# Patient Record
Sex: FEMALE | Race: WHITE | NOT HISPANIC OR LATINO | URBAN - METROPOLITAN AREA
[De-identification: names, ages, dates, MRNs, and addresses within clinical notes are randomized per-mention and may not be internally consistent; named-entity substitution may affect disease eponyms.]

---

## 2023-10-23 DIAGNOSIS — E78.5 HYPERLIPIDEMIA, UNSPECIFIED HYPERLIPIDEMIA TYPE: ICD-10-CM

## 2023-11-27 ENCOUNTER — OFFICE VISIT (OUTPATIENT)
Dept: OPHTHALMOLOGY | Facility: CLINIC | Age: 68
End: 2023-11-27
Payer: MEDICARE

## 2023-11-27 DIAGNOSIS — H53.47 HETERONYMOUS BILATERAL FIELD DEFECTS IN VISUAL FIELD: Primary | ICD-10-CM

## 2023-11-27 DIAGNOSIS — H40.002 GLAUCOMA SUSPECT, LEFT: ICD-10-CM

## 2023-11-27 DIAGNOSIS — H35.362 DRUSEN OF MACULA, LEFT: ICD-10-CM

## 2023-11-27 PROCEDURE — 92133 CPTRZD OPH DX IMG PST SGM ON: CPT | Performed by: PSYCHIATRY & NEUROLOGY

## 2023-11-27 PROCEDURE — 92083 EXTENDED VISUAL FIELD XM: CPT | Performed by: PSYCHIATRY & NEUROLOGY

## 2023-11-27 PROCEDURE — 99205 OFFICE O/P NEW HI 60 MIN: CPT | Performed by: PSYCHIATRY & NEUROLOGY

## 2023-11-27 RX ORDER — EXEMESTANE 25 MG/1
TABLET ORAL
COMMUNITY
Start: 2017-04-04

## 2023-11-27 RX ORDER — DEXLANSOPRAZOLE 60 MG/1
1 CAPSULE, DELAYED RELEASE ORAL DAILY
COMMUNITY
Start: 2023-09-27 | End: 2024-02-28 | Stop reason: SDUPTHER

## 2023-11-27 RX ORDER — CHOLECALCIFEROL (VITAMIN D3) 25 MCG
TABLET ORAL
COMMUNITY

## 2023-11-27 RX ORDER — ASPIRIN 81 MG/1
TABLET ORAL
COMMUNITY

## 2023-11-27 RX ORDER — CALCITRIOL 0.25 UG/1
1 CAPSULE ORAL DAILY
COMMUNITY
Start: 2017-04-04

## 2023-11-27 ASSESSMENT — CUP TO DISC RATIO
OS_RATIO: 0.55
OD_RATIO: 0.4

## 2023-11-27 ASSESSMENT — ENCOUNTER SYMPTOMS
NEUROLOGICAL NEGATIVE: 0
GASTROINTESTINAL NEGATIVE: 0
EYES NEGATIVE: 0
PSYCHIATRIC NEGATIVE: 0
RESPIRATORY NEGATIVE: 0
ALLERGIC/IMMUNOLOGIC NEGATIVE: 0
ENDOCRINE NEGATIVE: 0
MUSCULOSKELETAL NEGATIVE: 0
CARDIOVASCULAR NEGATIVE: 0
HEMATOLOGIC/LYMPHATIC NEGATIVE: 0
CONSTITUTIONAL NEGATIVE: 0

## 2023-11-27 ASSESSMENT — CONF VISUAL FIELD
OD_NORMAL: 1
OS_SUPERIOR_NASAL_RESTRICTION: 0
OD_INFERIOR_TEMPORAL_RESTRICTION: 0
OS_NORMAL: 1
OS_SUPERIOR_TEMPORAL_RESTRICTION: 0
METHOD: COUNTING FINGERS
OD_SUPERIOR_TEMPORAL_RESTRICTION: 0
OS_INFERIOR_NASAL_RESTRICTION: 0
OD_SUPERIOR_NASAL_RESTRICTION: 0
OD_INFERIOR_NASAL_RESTRICTION: 0
OS_INFERIOR_TEMPORAL_RESTRICTION: 0

## 2023-11-27 ASSESSMENT — SLIT LAMP EXAM - LIDS
COMMENTS: NORMAL
COMMENTS: NORMAL

## 2023-11-27 ASSESSMENT — VISUAL ACUITY
OD_CC: 20/20
OS_CC: 20/20
METHOD: SNELLEN - LINEAR

## 2023-11-27 ASSESSMENT — TONOMETRY
IOP_METHOD: GOLDMANN APPLANATION
OD_IOP_MMHG: 12
OS_IOP_MMHG: 12

## 2023-11-27 ASSESSMENT — EXTERNAL EXAM - LEFT EYE: OS_EXAM: NORMAL

## 2023-11-27 ASSESSMENT — EXTERNAL EXAM - RIGHT EYE: OD_EXAM: NORMAL

## 2023-11-27 NOTE — LETTER
November 27, 2023     Edmund Roblero    Patient: Kiley Ward   YOB: 1955   Date of Visit: 11/27/2023     Dear Dr. Edmund Roblero:    I am writing to share my findings regarding our shared patient Kiley Ward from her visit with me on 11/27/2023.    HPI    This 68 year-old woman with a history of HER2 and ER positive bilateral breast cancer (remission since 2011, s/p chemo and XRT), osteoporosis, and goiter presents for evaluation of binasal visual field deficits.    She was referred by optometrist Dr. Edmund Roblero from [facility] for evaluation of reproducible binasal visual field deficits and IOP of 9 and 11 respectively.     Her only visual complaint is floaters. She is not aware of any peripheral vision decrease. She denies other neurological symptoms.  Last edited by Roger Downs MD PhD on 11/27/2023 10:35 AM.        Diagnoses    Diagnoses and all orders for this visit:  Heteronymous bilateral field defects in visual field (Primary)  -     OCT, Optic Nerve - OU - Both Eyes  -     OCT, Retina - OU - Both Eyes  -     Anderson Visual Field - OU - Both Eyes  Drusen of macula, left  Glaucoma suspect, left    Assessment and Plan    11/27/2023 OCT RNFL  & OS 82 with borderline S thinning.  OCT macula OD normal foveal contour 247 & OS normal foveal contour 247.    11/27/2023 HVF 24-2 OD fovea 32, scatter MD -1.75 & OS fovea 35, rim artifact MD -4.16.  10/03/2023 HVF 24-2 reliable, with early nasal step defect OU (no mean deviation given)  10/17/2023 HVF 30-2 OD early nasal VF deficit MD -1.44, OS early nasal step and superotemporal deficit MD -3.81     This 68 year-old woman with a history of HER2 and ER positive bilateral breast cancer (remission since 2011, s/p chemo and XRT), osteoporosis, and goiter presents for evaluation of binasal visual field deficits.    Testing with Anderson visual field (HVF) is reassuring, but funduscopy and OCT retinal nerve fiber layer raise  concerns for superior thinning of the left optic disc rim concerning for glaucoma. Other causes of optic neuropathy are far less likely. We discussed glaucoma evaluation.    She also has a few large drusen in the left macula. 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 We discussed monitoring with a retina specialist.    Plan    Referrals for glaucoma and retina evaluations.    Follow up with neuro-ophthalmology if concerns otherwise arise. (Dilated 11/27/2023)      Below you will find my full examination. I appreciate the opportunity to see Kiley Ward today and to share in her care with you. Please contact me if you have questions for me regarding this visit or if I can be of assistance to another of your patients with neuro-ophthalmological problems.    Sincerely,    Roger Downs MD PhD    CC:   No Recipients      Base Eye Exam       Visual Acuity (Snellen - Linear)         Right Left    Dist cc 20/20 20/20              Tonometry (Goldmann Applanation, 9:35 AM)         Right Left    Pressure 12 12              Pupils         Dark Light Shape React APD    Right 4 2 Round Brisk None    Left 4 2 Round Brisk None              Visual Fields (Counting fingers)         Left Right     Full Full   CF and red test object             Extraocular Movement         Right Left     Full, Ortho Full, Ortho              Neuro/Psych       Oriented x3: Yes              Dilation       Both eyes: 1% Mydriacyl & 2.5% Jimmy  @ 10:37 AM                  Additional Tests       Color         Right Left    Ishihara 11 11                  Slit Lamp and Fundus Exam       External Exam         Right Left    External Normal Normal              Slit Lamp Exam         Right Left    Lids/Lashes Normal Normal    Conjunctiva/Sclera White and quiet White and quiet    Cornea Clear Clear    Anterior Chamber Deep and quiet Deep and quiet    Iris Round and reactive Round and reactive    Lens Trace Nuclear sclerosis  Trace Nuclear sclerosis    Anterior Vitreous Posterior vitreous detachment Posterior vitreous detachment              Fundus Exam         Right Left    Disc Normal superior notch    C/D Ratio 0.4 0.55    Macula Normal few large drusen    Vessels Normal Normal    Periphery Normal Normal

## 2023-11-27 NOTE — PROGRESS NOTES
Assessment and Plan    11/27/2023 OCT RNFL  & OS 82 with borderline S thinning.  OCT macula OD normal foveal contour 247 & OS normal foveal contour 247.    11/27/2023 HVF 24-2 OD fovea 32, scatter MD -1.75 & OS fovea 35, rim artifact MD -4.16.  10/03/2023 HVF 24-2 reliable, with early nasal step defect OU (no mean deviation given)  10/17/2023 HVF 30-2 OD early nasal VF deficit MD -1.44, OS early nasal step and superotemporal deficit MD -3.81     This 68 year-old woman with a history of HER2 and ER positive bilateral breast cancer (remission since 2011, s/p chemo and XRT), osteoporosis, and goiter presents for evaluation of binasal visual field deficits.    Testing with Anderson visual field (HVF) is reassuring, but funduscopy and OCT retinal nerve fiber layer raise concerns for superior thinning of the left optic disc rim concerning for glaucoma. Other causes of optic neuropathy are far less likely. We discussed glaucoma evaluation.    She also has a few large drusen in the left macula. 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 We discussed monitoring with a retina specialist.    Plan    Referrals for glaucoma and retina evaluations.    Follow up with neuro-ophthalmology if concerns otherwise arise. (Dilated 11/27/2023)

## 2023-12-07 ENCOUNTER — APPOINTMENT (OUTPATIENT)
Dept: PRIMARY CARE | Facility: CLINIC | Age: 68
End: 2023-12-07
Payer: MEDICARE

## 2024-02-21 DIAGNOSIS — Z00.00 HEALTHCARE MAINTENANCE: ICD-10-CM

## 2024-02-21 DIAGNOSIS — E78.5 HYPERLIPIDEMIA, UNSPECIFIED HYPERLIPIDEMIA TYPE: ICD-10-CM

## 2024-02-22 ENCOUNTER — APPOINTMENT (OUTPATIENT)
Dept: OPHTHALMOLOGY | Facility: CLINIC | Age: 69
End: 2024-02-22
Payer: MEDICARE

## 2024-02-23 ENCOUNTER — LAB (OUTPATIENT)
Dept: LAB | Facility: LAB | Age: 69
End: 2024-02-23
Payer: MEDICARE

## 2024-02-23 DIAGNOSIS — Z00.00 HEALTHCARE MAINTENANCE: ICD-10-CM

## 2024-02-23 DIAGNOSIS — E78.5 HYPERLIPIDEMIA, UNSPECIFIED HYPERLIPIDEMIA TYPE: ICD-10-CM

## 2024-02-23 LAB
ALBUMIN SERPL BCP-MCNC: 4.4 G/DL (ref 3.4–5)
ALP SERPL-CCNC: 66 U/L (ref 33–136)
ALT SERPL W P-5'-P-CCNC: 18 U/L (ref 7–45)
ANION GAP SERPL CALC-SCNC: 10 MMOL/L (ref 10–20)
AST SERPL W P-5'-P-CCNC: 22 U/L (ref 9–39)
BILIRUB SERPL-MCNC: 0.6 MG/DL (ref 0–1.2)
BUN SERPL-MCNC: 20 MG/DL (ref 6–23)
CALCIUM SERPL-MCNC: 9.6 MG/DL (ref 8.6–10.3)
CHLORIDE SERPL-SCNC: 104 MMOL/L (ref 98–107)
CHOLEST SERPL-MCNC: 200 MG/DL (ref 0–199)
CHOLESTEROL/HDL RATIO: 3.1
CO2 SERPL-SCNC: 28 MMOL/L (ref 21–32)
CREAT SERPL-MCNC: 0.95 MG/DL (ref 0.5–1.05)
EGFRCR SERPLBLD CKD-EPI 2021: 66 ML/MIN/1.73M*2
GLUCOSE SERPL-MCNC: 83 MG/DL (ref 74–99)
HDLC SERPL-MCNC: 65.2 MG/DL
LDLC SERPL CALC-MCNC: 120 MG/DL
NON HDL CHOLESTEROL: 135 MG/DL (ref 0–149)
POTASSIUM SERPL-SCNC: 3.9 MMOL/L (ref 3.5–5.3)
PROT SERPL-MCNC: 7.6 G/DL (ref 6.4–8.2)
SODIUM SERPL-SCNC: 138 MMOL/L (ref 136–145)
TRIGL SERPL-MCNC: 72 MG/DL (ref 0–149)
VLDL: 14 MG/DL (ref 0–40)

## 2024-02-23 PROCEDURE — 80053 COMPREHEN METABOLIC PANEL: CPT

## 2024-02-23 PROCEDURE — 80061 LIPID PANEL: CPT

## 2024-02-24 NOTE — RESULT ENCOUNTER NOTE
Cholesterol did increase slightly up to 200, HDL 65, , triglyceride 92    Sugar, kidneys, liver, electrodes are all within normal limits

## 2024-02-26 ENCOUNTER — OFFICE VISIT (OUTPATIENT)
Dept: OPHTHALMOLOGY | Facility: CLINIC | Age: 69
End: 2024-02-26
Payer: MEDICARE

## 2024-02-26 DIAGNOSIS — H43.813 PVD (POSTERIOR VITREOUS DETACHMENT), BOTH EYES: ICD-10-CM

## 2024-02-26 DIAGNOSIS — H35.3124 NONEXUDATIVE AGE-RELATED MACULAR DEGENERATION, LEFT EYE, ADVANCED ATROPHIC WITH SUBFOVEAL INVOLVEMENT: ICD-10-CM

## 2024-02-26 DIAGNOSIS — H35.30 ARMD (AGE RELATED MACULAR DEGENERATION): Primary | ICD-10-CM

## 2024-02-26 PROCEDURE — 92134 CPTRZ OPH DX IMG PST SGM RTA: CPT | Performed by: OPHTHALMOLOGY

## 2024-02-26 PROCEDURE — 99213 OFFICE O/P EST LOW 20 MIN: CPT | Performed by: OPHTHALMOLOGY

## 2024-02-26 ASSESSMENT — ENCOUNTER SYMPTOMS
EYES NEGATIVE: 1
RESPIRATORY NEGATIVE: 0
ALLERGIC/IMMUNOLOGIC NEGATIVE: 0
GASTROINTESTINAL NEGATIVE: 0
MUSCULOSKELETAL NEGATIVE: 0
CONSTITUTIONAL NEGATIVE: 0
PSYCHIATRIC NEGATIVE: 0
ENDOCRINE NEGATIVE: 0
NEUROLOGICAL NEGATIVE: 0
HEMATOLOGIC/LYMPHATIC NEGATIVE: 0
CARDIOVASCULAR NEGATIVE: 0

## 2024-02-26 ASSESSMENT — CUP TO DISC RATIO
OS_RATIO: 0.5
OD_RATIO: 0.3

## 2024-02-26 ASSESSMENT — VISUAL ACUITY
OS_SC: 20/20
OS_SC+: -2
OD_SC+: -1
OD_SC: 20/20
METHOD: SNELLEN - LINEAR

## 2024-02-26 ASSESSMENT — SLIT LAMP EXAM - LIDS
COMMENTS: NORMAL
COMMENTS: NORMAL

## 2024-02-26 ASSESSMENT — EXTERNAL EXAM - RIGHT EYE: OD_EXAM: NORMAL

## 2024-02-26 ASSESSMENT — TONOMETRY
OD_IOP_MMHG: 13
IOP_METHOD: TONOPEN
OS_IOP_MMHG: 14

## 2024-02-26 ASSESSMENT — EXTERNAL EXAM - LEFT EYE: OS_EXAM: NORMAL

## 2024-02-26 NOTE — PROGRESS NOTES
Assessment/Plan   Diagnoses and all orders for this visit:  ARMD (age related macular degeneration)  -     OCT, Retina - OU - Both Eyes  Nonexudative age-related macular degeneration, left eye, advanced atrophic with subfoveal involvement  PVD (posterior vitreous detachment), both eyes    No history of checkpoint inhibitors  Had breast cancer    1. Importance of avoiding situations of risk for eye injury and of routine use of appropriate safety eye protection discussed with patient and emphasized.  2. Diet and lifestyle precautions regarding age-related macular degeneration discussed.  Have recommended AREDS 2 vitamins, refraining from smoking, UV protection, and self-monitoring of vision. Patient advised to seek ophthalmic follow-up promptly if there are significant changes in vision.  3. Follow up retina      She has few drusen in both eyes as well as fine epiretinal membrane (ERM) in both eyes  Vision is excellent  Follow up 6 months

## 2024-02-28 ENCOUNTER — OFFICE VISIT (OUTPATIENT)
Dept: PRIMARY CARE | Facility: CLINIC | Age: 69
End: 2024-02-28
Payer: MEDICARE

## 2024-02-28 VITALS
HEART RATE: 66 BPM | WEIGHT: 157 LBS | SYSTOLIC BLOOD PRESSURE: 122 MMHG | BODY MASS INDEX: 26.95 KG/M2 | DIASTOLIC BLOOD PRESSURE: 80 MMHG

## 2024-02-28 DIAGNOSIS — Z12.11 SCREENING FOR MALIGNANT NEOPLASM OF COLON: ICD-10-CM

## 2024-02-28 DIAGNOSIS — E55.9 VITAMIN D DEFICIENCY: ICD-10-CM

## 2024-02-28 DIAGNOSIS — Z00.00 HEALTHCARE MAINTENANCE: ICD-10-CM

## 2024-02-28 DIAGNOSIS — Z23 NEED FOR PNEUMOCOCCAL 20-VALENT CONJUGATE VACCINATION: ICD-10-CM

## 2024-02-28 DIAGNOSIS — Z85.3 HISTORY OF BREAST CANCER IN FEMALE: ICD-10-CM

## 2024-02-28 DIAGNOSIS — Z12.31 ENCOUNTER FOR SCREENING MAMMOGRAM FOR MALIGNANT NEOPLASM OF BREAST: ICD-10-CM

## 2024-02-28 DIAGNOSIS — K21.9 GASTROESOPHAGEAL REFLUX DISEASE, UNSPECIFIED WHETHER ESOPHAGITIS PRESENT: ICD-10-CM

## 2024-02-28 DIAGNOSIS — E78.5 HYPERLIPIDEMIA, UNSPECIFIED HYPERLIPIDEMIA TYPE: ICD-10-CM

## 2024-02-28 DIAGNOSIS — Z00.00 MEDICARE ANNUAL WELLNESS VISIT, SUBSEQUENT: Primary | ICD-10-CM

## 2024-02-28 DIAGNOSIS — M81.0 AGE-RELATED OSTEOPOROSIS WITHOUT CURRENT PATHOLOGICAL FRACTURE: ICD-10-CM

## 2024-02-28 PROBLEM — M19.90 ARTHRITIS: Status: ACTIVE | Noted: 2024-02-28

## 2024-02-28 PROBLEM — K57.90 DIVERTICULAR DISEASE: Status: ACTIVE | Noted: 2024-02-28

## 2024-02-28 PROBLEM — E05.90 HYPERTHYROIDISM: Status: ACTIVE | Noted: 2024-02-28

## 2024-02-28 PROBLEM — L03.90 CELLULITIS: Status: ACTIVE | Noted: 2024-02-28

## 2024-02-28 PROCEDURE — 1036F TOBACCO NON-USER: CPT | Performed by: STUDENT IN AN ORGANIZED HEALTH CARE EDUCATION/TRAINING PROGRAM

## 2024-02-28 PROCEDURE — G0444 DEPRESSION SCREEN ANNUAL: HCPCS | Performed by: STUDENT IN AN ORGANIZED HEALTH CARE EDUCATION/TRAINING PROGRAM

## 2024-02-28 PROCEDURE — 1159F MED LIST DOCD IN RCRD: CPT | Performed by: STUDENT IN AN ORGANIZED HEALTH CARE EDUCATION/TRAINING PROGRAM

## 2024-02-28 PROCEDURE — G0009 ADMIN PNEUMOCOCCAL VACCINE: HCPCS | Performed by: STUDENT IN AN ORGANIZED HEALTH CARE EDUCATION/TRAINING PROGRAM

## 2024-02-28 PROCEDURE — 1160F RVW MEDS BY RX/DR IN RCRD: CPT | Performed by: STUDENT IN AN ORGANIZED HEALTH CARE EDUCATION/TRAINING PROGRAM

## 2024-02-28 PROCEDURE — 99214 OFFICE O/P EST MOD 30 MIN: CPT | Performed by: STUDENT IN AN ORGANIZED HEALTH CARE EDUCATION/TRAINING PROGRAM

## 2024-02-28 PROCEDURE — 90677 PCV20 VACCINE IM: CPT | Performed by: STUDENT IN AN ORGANIZED HEALTH CARE EDUCATION/TRAINING PROGRAM

## 2024-02-28 PROCEDURE — G0439 PPPS, SUBSEQ VISIT: HCPCS | Performed by: STUDENT IN AN ORGANIZED HEALTH CARE EDUCATION/TRAINING PROGRAM

## 2024-02-28 PROCEDURE — 1170F FXNL STATUS ASSESSED: CPT | Performed by: STUDENT IN AN ORGANIZED HEALTH CARE EDUCATION/TRAINING PROGRAM

## 2024-02-28 PROCEDURE — 1158F ADVNC CARE PLAN TLK DOCD: CPT | Performed by: STUDENT IN AN ORGANIZED HEALTH CARE EDUCATION/TRAINING PROGRAM

## 2024-02-28 PROCEDURE — 1123F ACP DISCUSS/DSCN MKR DOCD: CPT | Performed by: STUDENT IN AN ORGANIZED HEALTH CARE EDUCATION/TRAINING PROGRAM

## 2024-02-28 PROCEDURE — 99397 PER PM REEVAL EST PAT 65+ YR: CPT | Performed by: STUDENT IN AN ORGANIZED HEALTH CARE EDUCATION/TRAINING PROGRAM

## 2024-02-28 RX ORDER — EXEMESTANE 25 MG/1
25 TABLET ORAL
Qty: 90 TABLET | Status: CANCELLED | OUTPATIENT
Start: 2024-02-28

## 2024-02-28 RX ORDER — ERGOCALCIFEROL 1.25 MG/1
CAPSULE ORAL
COMMUNITY
Start: 2023-09-19

## 2024-02-28 RX ORDER — DEXLANSOPRAZOLE 60 MG/1
1 CAPSULE, DELAYED RELEASE ORAL DAILY
Qty: 90 CAPSULE | Refills: 1 | Status: SHIPPED | OUTPATIENT
Start: 2024-02-28

## 2024-02-28 ASSESSMENT — ENCOUNTER SYMPTOMS
OCCASIONAL FEELINGS OF UNSTEADINESS: 0
LOSS OF SENSATION IN FEET: 0
DEPRESSION: 0

## 2024-02-28 ASSESSMENT — ACTIVITIES OF DAILY LIVING (ADL)
GROCERY_SHOPPING: INDEPENDENT
MANAGING_FINANCES: INDEPENDENT
BATHING: INDEPENDENT
DOING_HOUSEWORK: INDEPENDENT
DRESSING: INDEPENDENT
TAKING_MEDICATION: INDEPENDENT

## 2024-02-28 ASSESSMENT — PATIENT HEALTH QUESTIONNAIRE - PHQ9
SUM OF ALL RESPONSES TO PHQ9 QUESTIONS 1 AND 2: 0
1. LITTLE INTEREST OR PLEASURE IN DOING THINGS: NOT AT ALL
2. FEELING DOWN, DEPRESSED OR HOPELESS: NOT AT ALL

## 2024-02-28 NOTE — PROGRESS NOTES
Subjective   Reason for Visit: Kiley Ward is an 68 y.o. female here for a Medicare Wellness visit.          Reviewed all medications by prescribing practitioner or clinical pharmacist (such as prescriptions, OTCs, herbal therapies and supplements) and documented in the medical record.    HPI  1. Health maintenance/MCR  Overall patient is doing well. Recently established care here after moving from [place]. Sees a lot of physicians at [facility], including oncology, endocrinology, gastroenterology.   History of breast cancer bilaterally, parathyroidism, and Crocker's esophagus  Immunization: Tdap , DUE; influenza vaccine recommended  Shingrix 2019 PPSV23 recommended  COVID-19 vaccine up-to-date  Colon Cancer Screenin, repeat in 5 years - DUE  OB/GYN: Sees [facility] physicians; Pap smear  and mammogram  DUE - pt states she had one last year at [facility] but hasn't not been uploaded into EPIC, history of breast cancer   Diet: Well-balanced  Exercise: Regularly  Tobacco: Denies use  EtOH: Denies use     2. Hyperlipidemia  Previous Lipid panel from 5 days ago showed chol 200 and   Not currently on any medications  ASCVD 5.3%     3. Vitamin D def  Currently on Vit D and Calcium supplementation.   Currently sees and endocrinologist who takes care of this and her DEXA scan which she states her most recent was last year.   Our records show DEXA  - osteoporosis    4. History of bilateral breast cancer  No longer sees oncologist and would like us to take over her Exemestane Rx.     5. Gerd/Hx of barretts eso  Endoscopy in  showed improvement per patient.   Taking Dexlansoprazole for GERD and sxs are well controlled. She is interested in having us take over this Rx as well.     6. Need for Tdap and PCV vaccination  Agreeable for vaccination  Denies any side effects previous vaccination        No Known Allergies    Immunization History   Administered Date(s) Administered    Flu vaccine (IIV4),  preservative free *Check age/dose* 11/09/2017    Influenza, Seasonal, Quadrivalent, Adjuvanted 01/05/2024    Influenza, Unspecified 10/20/2020, 10/18/2022    Influenza, seasonal, injectable 10/26/2012, 10/16/2013, 10/30/2014, 11/16/2015    Influenza, seasonal, injectable, preservative free 11/08/2018    Pfizer Purple Cap SARS-CoV-2 03/23/2021, 04/13/2021, 01/20/2022    Tdap vaccine, age 7 year and older (BOOSTRIX, ADACEL) 09/04/2014    Zoster vaccine, recombinant, adult (SHINGRIX) 09/26/2019, 01/16/2020    Zoster, live 03/25/2014       Patient Self Assessment of Health Status  Patient Self Assessment: Excellent    Nutrition and Exercise  Current Diet: Well Balanced Diet  Adequate Fluid Intake: No  Caffeine: Yes  Exercise Frequency: Regularly    Functional Ability/Level of Safety  Cognitive Impairment Observed: No cognitive impairment observed    Home Safety Risk Factors: None    Patient Care Team:  Ante T Pletikosic, DO as PCP - General  Ante T Pletikosic, DO as PCP - [employer] PCP     Review of Systems  All pertinent positive symptoms are included in the history of present illness.    All other systems have been reviewed and are negative and noncontributory to this patient's current ailments.    Objective   Vitals:  /80 (BP Location: Left arm, Patient Position: Sitting, BP Cuff Size: Adult)   Pulse 66   Wt 71.2 kg (157 lb)   BMI 26.95 kg/m²     Lab on 02/23/2024   Component Date Value Ref Range Status    Cholesterol 02/23/2024 200 (H)  0 - 199 mg/dL Final          Age      Desirable   Borderline High   High     0-19 Y     0 - 169       170 - 199     >/= 200    20-24 Y     0 - 189       190 - 224     >/= 225         >24 Y     0 - 199       200 - 239     >/= 240   **All ranges are based on fasting samples. Specific   therapeutic targets will vary based on patient-specific   cardiac risk.    Pediatric guidelines reference:Pediatrics 2011, 128(S5).Adult guidelines reference: NCEP ATPIII  Guidelines,EMMETT 2001, 258:2486-97    Venipuncture immediately after or during the administration of Metamizole may lead to falsely low results. Testing should be performed immediately prior to Metamizole dosing.    HDL-Cholesterol 02/23/2024 65.2  mg/dL Final      Age       Very Low   Low     Normal    High    0-19 Y    < 35      < 40     40-45     ----  20-24 Y    ----     < 40      >45      ----        >24 Y      ----     < 40     40-60      >60      Cholesterol/HDL Ratio 02/23/2024 3.1   Final      Ref Values  Desirable  < 3.4  High Risk  > 5.0    LDL Calculated 02/23/2024 120 (H)  <=99 mg/dL Final                                Near   Borderline      AGE      Desirable  Optimal    High     High     Very High     0-19 Y     0 - 109     ---    110-129   >/= 130     ----    20-24 Y     0 - 119     ---    120-159   >/= 160     ----      >24 Y     0 -  99   100-129  130-159   160-189     >/=190      VLDL 02/23/2024 14  0 - 40 mg/dL Final    Triglycerides 02/23/2024 72  0 - 149 mg/dL Final       Age         Desirable   Borderline High   High     Very High   0 D-90 D    19 - 174         ----         ----        ----  91 D- 9 Y     0 -  74        75 -  99     >/= 100      ----    10-19 Y     0 -  89        90 - 129     >/= 130      ----    20-24 Y     0 - 114       115 - 149     >/= 150      ----         >24 Y     0 - 149       150 - 199    200- 499    >/= 500    Venipuncture immediately after or during the administration of Metamizole may lead to falsely low results. Testing should be performed immediately prior to Metamizole dosing.    Non HDL Cholesterol 02/23/2024 135  0 - 149 mg/dL Final          Age       Desirable   Borderline High   High     Very High     0-19 Y     0 - 119       120 - 144     >/= 145    >/= 160    20-24 Y     0 - 149       150 - 189     >/= 190      ----         >24 Y    30 mg/dL above LDL Cholesterol goal      Glucose 02/23/2024 83  74 - 99 mg/dL Final    Sodium 02/23/2024 138  136 - 145  mmol/L Final    Potassium 02/23/2024 3.9  3.5 - 5.3 mmol/L Final    Chloride 02/23/2024 104  98 - 107 mmol/L Final    Bicarbonate 02/23/2024 28  21 - 32 mmol/L Final    Anion Gap 02/23/2024 10  10 - 20 mmol/L Final    Urea Nitrogen 02/23/2024 20  6 - 23 mg/dL Final    Creatinine 02/23/2024 0.95  0.50 - 1.05 mg/dL Final    eGFR 02/23/2024 66  >60 mL/min/1.73m*2 Final    Calculations of estimated GFR are performed using the 2021 CKD-EPI Study Refit equation without the race variable for the IDMS-Traceable creatinine methods.  https://jasn.asnjournals.org/content/early/2021/09/22/ASN.3493882003    Calcium 02/23/2024 9.6  8.6 - 10.3 mg/dL Final    Albumin 02/23/2024 4.4  3.4 - 5.0 g/dL Final    Alkaline Phosphatase 02/23/2024 66  33 - 136 U/L Final    Total Protein 02/23/2024 7.6  6.4 - 8.2 g/dL Final    AST 02/23/2024 22  9 - 39 U/L Final    Bilirubin, Total 02/23/2024 0.6  0.0 - 1.2 mg/dL Final    ALT 02/23/2024 18  7 - 45 U/L Final    Patients treated with Sulfasalazine may generate falsely decreased results for ALT.       Physical Exam  CONSTITUTIONAL - well nourished, well developed, looks like stated age, in no acute distress, not ill-appearing, and not tired appearing  SKIN - normal skin color and pigmentation, normal skin turgor without rash, lesions, or nodules visualized  HEAD - no trauma, normocephalic  EYES - pupils are equal and reactive to light, extraocular muscles are intact, and normal external exam  ENT -  no signs of infection, uvula midline, normal tongue movement and throat normal, no exudate, nasal passage without discharge and patent  NECK - supple without rigidity, no neck mass was observed  CHEST - clear to auscultation, no wheezing, no crackles and no rales, good effort  CARDIAC - regular rate and regular rhythm, no skipped beats, no murmur  ABDOMEN - no organomegaly, soft, nontender, nondistended, normal bowel sounds, no guarding/rebound/rigidity  EXTREMITIES - no edema, no  deformities  NEUROLOGICAL - normal gait, normal balance, normal motor, no ataxia; alert, oriented and no focal signs  PSYCHIATRIC - alert, pleasant and cordial, age-appropriate  IMMUNOLOGIC - no cervical lymphadenopathy    Assessment/Plan   Problem List Items Addressed This Visit       History of breast cancer in female    Vitamin D deficiency     Other Visit Diagnoses       Medicare annual wellness visit, subsequent    -  Primary    Healthcare maintenance        Hyperlipidemia, unspecified hyperlipidemia type        Age-related osteoporosis without current pathological fracture        Screening for malignant neoplasm of colon        Encounter for screening mammogram for malignant neoplasm of breast              1. Health maintenance/[facility]  Complete history and physical examination was performed  Your blood work was within normal limits other than the lipid panel which is discussed below.      2. Hyperlipidemia  Previous Lipid panel from 5 days ago showed chol 200 and   Not currently on any medications  We ordered a CT cardiac score to be done at your earliest convenience.     3. Vitamin D def  Currently on Vit D and Calcium supplementation.   Continue seeing your endocrinologist at [facility].    4. History of bilateral breast cancer  We will wait to prescribe your exemestane until you follow up with your oncologist as we do not have experience with this medication.      5. Gerd/Hx of barretts eso  A refill of your Dexlansoprazole was sent to your pharmacy. We also sent a GI referral for Dr. Shah who can follow your barrets and also complete your colonoscopy at the same time.     6. Need for Tdap and PCV vaccination  All questions were answered and you were counseled on immunization in detail and vaccinations were provided  PCV vaccination was provided in our office today.   Please go to a pharmacy to update your Tdap vaccinations.

## 2024-03-02 ENCOUNTER — HOSPITAL ENCOUNTER (OUTPATIENT)
Dept: RADIOLOGY | Facility: CLINIC | Age: 69
End: 2024-03-02
Payer: MEDICARE

## 2024-03-28 ENCOUNTER — APPOINTMENT (OUTPATIENT)
Dept: OPHTHALMOLOGY | Facility: CLINIC | Age: 69
End: 2024-03-28
Payer: MEDICARE

## 2024-04-08 ENCOUNTER — APPOINTMENT (OUTPATIENT)
Dept: OPHTHALMOLOGY | Facility: CLINIC | Age: 69
End: 2024-04-08
Payer: MEDICARE

## 2024-05-03 ENCOUNTER — OFFICE VISIT (OUTPATIENT)
Dept: GASTROENTEROLOGY | Facility: CLINIC | Age: 69
End: 2024-05-03
Payer: MEDICARE

## 2024-05-03 VITALS — WEIGHT: 158 LBS | HEIGHT: 64 IN | HEART RATE: 68 BPM | BODY MASS INDEX: 26.98 KG/M2 | OXYGEN SATURATION: 98 %

## 2024-05-03 DIAGNOSIS — K22.70 SHORT-SEGMENT BARRETT'S ESOPHAGUS: Primary | ICD-10-CM

## 2024-05-03 PROCEDURE — 1159F MED LIST DOCD IN RCRD: CPT

## 2024-05-03 PROCEDURE — 1036F TOBACCO NON-USER: CPT

## 2024-05-03 PROCEDURE — 1160F RVW MEDS BY RX/DR IN RCRD: CPT

## 2024-05-03 PROCEDURE — 99203 OFFICE O/P NEW LOW 30 MIN: CPT

## 2024-05-03 ASSESSMENT — ENCOUNTER SYMPTOMS
NAUSEA: 0
CONSTIPATION: 0
COUGH: 0
RECTAL PAIN: 0
FATIGUE: 0
TROUBLE SWALLOWING: 0
BLOOD IN STOOL: 0
SHORTNESS OF BREATH: 0
ANAL BLEEDING: 0
CHILLS: 0
ABDOMINAL PAIN: 0
APPETITE CHANGE: 0
DIARRHEA: 0
ABDOMINAL DISTENTION: 0
FEVER: 0
VOMITING: 0

## 2024-05-03 NOTE — PATIENT INSTRUCTIONS
Please get records for me and we will decide on colonoscopy.  Next EGD in 2026  Follow up annually or as needed

## 2024-05-03 NOTE — ASSESSMENT & PLAN NOTE
-Continue 60 mg Dexilant  -Next EGD 2026    Next colonoscopy will be determined when records are received    Follow-up annually or as needed

## 2024-05-03 NOTE — PROGRESS NOTES
Subjective     History of Present Illness:   Kiley Ward is a 69 y.o. female with PMHx of DVT, breast cancer with mets, GERD who presents to GI clinic for further evaluation office visit prior to colonoscopy    Today, patient here to establish care.  States she has Crocker's.  On dexilant, controlling GERD. Moves bowels daily with normal stools.  Feels she may be due for colonoscopy and last 1 was with  in [facility].  Denies constipation, diarrhea, dyspepsia, melena, hematochezia, dysphagia, unintentional weight loss    Social ETOH, denies smoking, marijuana  Denies fxh GI cancer: maternal grandfather colon cancer. Denies fhx IBD  Abdominal Surgeries: denies    Last colonoscopy maybe 5 yrs ago  Last EGD 8/2023- barrets without dysplasia, small hiatal hernia, salmon colored mucosa- short seg barretts- repeat 3 yrs      Past Medical History  As per HPI.     Social History  she  reports that she has never smoked. She has never used smokeless tobacco. Alcohol use questions deferred to the physician. Drug use questions deferred to the physician.     Family History  her family history includes No Known Problems in her mother.     Review of Systems  Review of Systems   Constitutional:  Negative for appetite change, chills, fatigue and fever.   HENT:  Negative for trouble swallowing.    Respiratory:  Negative for cough and shortness of breath.    Gastrointestinal:  Negative for abdominal distention, abdominal pain, anal bleeding, blood in stool, constipation, diarrhea, nausea, rectal pain and vomiting.       Allergies  No Known Allergies    Medications  Current Outpatient Medications   Medication Instructions    aspirin 81 mg EC tablet oral    calcitriol (Rocaltrol) 0.25 mcg capsule 1 capsule, oral, Daily    CALCIUM CARBONATE-VITAMIN D3 ORAL oral    cholecalciferol (Vitamin D-3) 25 MCG (1000 UT) tablet oral    dexlansoprazole (DEXILANT) 60 mg, oral, Daily    ergocalciferol (Vitamin D-2) 1.25 MG (65671 UT) capsule      exemestane (Aromasin) 25 mg tablet oral, Daily RT        Objective   Visit Vitals  Pulse 68      Physical Exam  Constitutional:       Appearance: Normal appearance. She is normal weight.   HENT:      Mouth/Throat:      Mouth: Mucous membranes are dry.      Pharynx: Oropharynx is clear.   Cardiovascular:      Rate and Rhythm: Normal rate and regular rhythm.   Pulmonary:      Effort: Pulmonary effort is normal.      Breath sounds: Normal breath sounds. No wheezing or rhonchi.   Abdominal:      General: Abdomen is flat. Bowel sounds are normal. There is no distension.      Palpations: Abdomen is soft. There is no hepatomegaly.      Tenderness: There is no abdominal tenderness. There is no guarding or rebound. Negative signs include Armando's sign.      Hernia: No hernia is present.   Musculoskeletal:         General: Normal range of motion.   Skin:     General: Skin is warm and dry.   Neurological:      General: No focal deficit present.      Mental Status: She is alert and oriented to person, place, and time.   Psychiatric:         Mood and Affect: Mood normal.         Behavior: Behavior normal.           Lab Results   Component Value Date    WBC 4.8 10/18/2022    WBC 5.0 03/18/2021    HGB 13.2 10/18/2022    HGB 12.9 03/18/2021    HCT 40.2 10/18/2022    HCT 40.1 03/18/2021     10/18/2022     03/18/2021     Lab Results   Component Value Date     02/23/2024     10/18/2022     03/18/2021    K 3.9 02/23/2024    K 3.7 10/18/2022    K 4.1 03/18/2021     02/23/2024     10/18/2022     03/18/2021    CO2 28 02/23/2024    CO2 27 10/18/2022    CO2 28 03/18/2021    BUN 20 02/23/2024    BUN 18 10/18/2022    BUN 15 03/18/2021    CREATININE 0.95 02/23/2024    CREATININE 0.85 10/18/2022    CREATININE 0.84 03/18/2021    CALCIUM 9.6 02/23/2024    CALCIUM 9.4 10/18/2022    CALCIUM 9.7 03/18/2021    PROT 7.6 02/23/2024    PROT 7.6 10/18/2022    PROT 7.4 03/18/2021    BILITOT 0.6  02/23/2024    BILITOT 0.7 10/18/2022    BILITOT 0.8 03/18/2021    ALKPHOS 66 02/23/2024    ALKPHOS 66 10/18/2022    ALKPHOS 71 03/18/2021    ALT 18 02/23/2024    ALT 17 10/18/2022    ALT 25 03/18/2021    AST 22 02/23/2024    AST 21 10/18/2022    AST 23 03/18/2021    GLUCOSE 83 02/23/2024    GLUCOSE 89 10/18/2022    GLUCOSE 98 03/18/2021           Kiley Ward is a 69 y.o. female who presents to GI clinic for establishment of care.    Short-segment Crocker's esophagus  -Continue 60 mg Dexilant  -Next EGD 2026    Next colonoscopy will be determined when records are received    Follow-up annually or as needed         YOSVANY Magdaleno-CNP

## 2024-05-13 ENCOUNTER — HOSPITAL ENCOUNTER (OUTPATIENT)
Dept: RADIOLOGY | Facility: CLINIC | Age: 69
Discharge: HOME | End: 2024-05-13
Payer: MEDICARE

## 2024-05-13 DIAGNOSIS — I71.21 ANEURYSM OF ASCENDING AORTA WITHOUT RUPTURE (CMS-HCC): Primary | ICD-10-CM

## 2024-05-13 DIAGNOSIS — E78.5 HYPERLIPIDEMIA, UNSPECIFIED HYPERLIPIDEMIA TYPE: ICD-10-CM

## 2024-05-13 PROCEDURE — 75571 CT HRT W/O DYE W/CA TEST: CPT

## 2024-05-13 NOTE — RESULT ENCOUNTER NOTE
CT cardiac score shows a value of 0 which places the patient in the lowest risk stratification for any cardiovascular disease    Will continue monitoring cholesterol closely    Block, there was an aortic dilatation that is approximately 5.2 cm in diameter    Due to this, we will order a CT angiogram to make sure the value is fully accurate    I will also place a requisition to follow-up with vascular surgery at her earliest mutual convenience    Dr. Kumar would be my recommendation

## 2024-05-15 DIAGNOSIS — I35.1 AORTIC VALVE INSUFFICIENCY, ETIOLOGY OF CARDIAC VALVE DISEASE UNSPECIFIED: ICD-10-CM

## 2024-05-15 DIAGNOSIS — I77.810 ASCENDING AORTA DILATION (CMS-HCC): ICD-10-CM

## 2024-05-21 DIAGNOSIS — I71.21 ANEURYSM OF ASCENDING AORTA WITHOUT RUPTURE (CMS-HCC): Primary | ICD-10-CM

## 2024-05-23 ENCOUNTER — LAB (OUTPATIENT)
Dept: LAB | Facility: LAB | Age: 69
End: 2024-05-23
Payer: MEDICARE

## 2024-05-23 DIAGNOSIS — E78.5 HYPERLIPIDEMIA, UNSPECIFIED HYPERLIPIDEMIA TYPE: ICD-10-CM

## 2024-05-23 DIAGNOSIS — I71.21 ANEURYSM OF ASCENDING AORTA WITHOUT RUPTURE (CMS-HCC): ICD-10-CM

## 2024-05-23 LAB
ALBUMIN SERPL BCP-MCNC: 4.6 G/DL (ref 3.4–5)
ALP SERPL-CCNC: 70 U/L (ref 33–136)
ALT SERPL W P-5'-P-CCNC: 19 U/L (ref 7–45)
ANION GAP SERPL CALC-SCNC: 11 MMOL/L (ref 10–20)
AST SERPL W P-5'-P-CCNC: 22 U/L (ref 9–39)
BILIRUB SERPL-MCNC: 0.6 MG/DL (ref 0–1.2)
BUN SERPL-MCNC: 17 MG/DL (ref 6–23)
CALCIUM SERPL-MCNC: 9.3 MG/DL (ref 8.6–10.3)
CHLORIDE SERPL-SCNC: 104 MMOL/L (ref 98–107)
CHOLEST SERPL-MCNC: 177 MG/DL (ref 0–199)
CHOLESTEROL/HDL RATIO: 3
CO2 SERPL-SCNC: 28 MMOL/L (ref 21–32)
CREAT SERPL-MCNC: 0.8 MG/DL (ref 0.5–1.05)
EGFRCR SERPLBLD CKD-EPI 2021: 81 ML/MIN/1.73M*2
GLUCOSE SERPL-MCNC: 97 MG/DL (ref 74–99)
HDLC SERPL-MCNC: 59.4 MG/DL
LDLC SERPL CALC-MCNC: 103 MG/DL
NON HDL CHOLESTEROL: 118 MG/DL (ref 0–149)
POTASSIUM SERPL-SCNC: 3.7 MMOL/L (ref 3.5–5.3)
PROT SERPL-MCNC: 7.4 G/DL (ref 6.4–8.2)
SODIUM SERPL-SCNC: 139 MMOL/L (ref 136–145)
TRIGL SERPL-MCNC: 74 MG/DL (ref 0–149)
VLDL: 15 MG/DL (ref 0–40)

## 2024-05-23 PROCEDURE — 36415 COLL VENOUS BLD VENIPUNCTURE: CPT

## 2024-05-23 PROCEDURE — 80053 COMPREHEN METABOLIC PANEL: CPT

## 2024-05-23 PROCEDURE — 80061 LIPID PANEL: CPT

## 2024-05-24 NOTE — RESULT ENCOUNTER NOTE
Cholesterol noted improvement down to 177, HDL 59, , triglycerides 74    Sugar, kidneys, liver, electrolytes are all within normal limits

## 2024-05-28 ENCOUNTER — APPOINTMENT (OUTPATIENT)
Dept: RADIOLOGY | Facility: CLINIC | Age: 69
End: 2024-05-28
Payer: MEDICARE

## 2024-05-29 ENCOUNTER — HOSPITAL ENCOUNTER (OUTPATIENT)
Dept: RADIOLOGY | Facility: CLINIC | Age: 69
Discharge: HOME | End: 2024-05-29
Payer: MEDICARE

## 2024-05-29 DIAGNOSIS — I71.21 ANEURYSM OF ASCENDING AORTA WITHOUT RUPTURE (CMS-HCC): ICD-10-CM

## 2024-05-29 PROCEDURE — 71275 CT ANGIOGRAPHY CHEST: CPT

## 2024-05-29 PROCEDURE — 71275 CT ANGIOGRAPHY CHEST: CPT | Performed by: INTERNAL MEDICINE

## 2024-05-29 PROCEDURE — 2550000001 HC RX 255 CONTRASTS: Performed by: STUDENT IN AN ORGANIZED HEALTH CARE EDUCATION/TRAINING PROGRAM

## 2024-05-29 RX ADMIN — IOHEXOL 68 ML: 350 INJECTION, SOLUTION INTRAVENOUS at 09:28

## 2024-05-30 ENCOUNTER — OFFICE VISIT (OUTPATIENT)
Dept: OPHTHALMOLOGY | Facility: CLINIC | Age: 69
End: 2024-05-30
Payer: MEDICARE

## 2024-05-30 DIAGNOSIS — I71.21 ANEURYSM OF ASCENDING AORTA WITHOUT RUPTURE (CMS-HCC): Primary | ICD-10-CM

## 2024-05-30 DIAGNOSIS — H40.003 GLAUCOMA SUSPECT OF BOTH EYES: ICD-10-CM

## 2024-05-30 DIAGNOSIS — Z01.00 EXAMINATION OF EYES AND VISION: Primary | ICD-10-CM

## 2024-05-30 PROCEDURE — 99214 OFFICE O/P EST MOD 30 MIN: CPT | Performed by: OPHTHALMOLOGY

## 2024-05-30 PROCEDURE — 92133 CPTRZD OPH DX IMG PST SGM ON: CPT | Performed by: OPHTHALMOLOGY

## 2024-05-30 ASSESSMENT — VISUAL ACUITY
OD_SC: 20/30
OS_PH_SC: 20/30
OS_SC+: +1
METHOD: SNELLEN - LINEAR
OS_SC: 20/40

## 2024-05-30 ASSESSMENT — ENCOUNTER SYMPTOMS
CONSTITUTIONAL NEGATIVE: 0
ENDOCRINE NEGATIVE: 0
PSYCHIATRIC NEGATIVE: 0
MUSCULOSKELETAL NEGATIVE: 0
CARDIOVASCULAR NEGATIVE: 0
GASTROINTESTINAL NEGATIVE: 0
RESPIRATORY NEGATIVE: 0
ALLERGIC/IMMUNOLOGIC NEGATIVE: 0
HEMATOLOGIC/LYMPHATIC NEGATIVE: 0
EYES NEGATIVE: 1
NEUROLOGICAL NEGATIVE: 0

## 2024-05-30 ASSESSMENT — EXTERNAL EXAM - LEFT EYE: OS_EXAM: NORMAL

## 2024-05-30 ASSESSMENT — PACHYMETRY
OS_CT(UM): 597
OD_CT(UM): 584

## 2024-05-30 ASSESSMENT — CONF VISUAL FIELD
OS_NORMAL: 1
OS_SUPERIOR_TEMPORAL_RESTRICTION: 0
OD_INFERIOR_NASAL_RESTRICTION: 0
OD_SUPERIOR_TEMPORAL_RESTRICTION: 0
OS_INFERIOR_NASAL_RESTRICTION: 0
OD_SUPERIOR_NASAL_RESTRICTION: 0
METHOD: COUNTING FINGERS
OD_INFERIOR_TEMPORAL_RESTRICTION: 0
OD_NORMAL: 1
OS_SUPERIOR_NASAL_RESTRICTION: 0
OS_INFERIOR_TEMPORAL_RESTRICTION: 0

## 2024-05-30 ASSESSMENT — EXTERNAL EXAM - RIGHT EYE: OD_EXAM: NORMAL

## 2024-05-30 ASSESSMENT — TONOMETRY
OD_IOP_MMHG: 16
OS_IOP_MMHG: 16
IOP_METHOD: GOLDMANN APPLANATION

## 2024-05-30 ASSESSMENT — CUP TO DISC RATIO
OS_RATIO: 0.5
OD_RATIO: 0.3

## 2024-05-30 ASSESSMENT — GONIOSCOPY
OD_SUPERIOR: 3/360
OS_SUPERIOR: 3/360

## 2024-05-30 ASSESSMENT — SLIT LAMP EXAM - LIDS
COMMENTS: NORMAL
COMMENTS: NORMAL

## 2024-05-30 NOTE — PROGRESS NOTES
1-open angle glaucoma (OAG) SUSPECT DUE TO OCT TESTING--REASSURED  2-NS both eyes WITH POLAR CHANGES left eye--MAY BE CAUSE FOR visual field (VF) CHANGE  3-RICO TEARS both eyes  4-DRUSEN MACULAR both eyes    4-6 MOS

## 2024-05-30 NOTE — RESULT ENCOUNTER NOTE
The ascending aortic aneurysm continues to measure approximate 5.2 cm    It is recommended that we do a follow-up cardiac MRI and MRA to assess this within the next 12 months    It is also noted that the aortic root is dilatated and there are slight abnormalities within the pulmonary arteries    In my opinion, I would recommend she follows up with a cardiothoracic surgeon to establish and to follow-up per their guidelines as well    I will place a requisition within the chart

## 2024-05-31 ENCOUNTER — HOSPITAL ENCOUNTER (OUTPATIENT)
Dept: CARDIOLOGY | Facility: CLINIC | Age: 69
Discharge: HOME | End: 2024-05-31
Payer: MEDICARE

## 2024-05-31 DIAGNOSIS — I35.1 AORTIC VALVE INSUFFICIENCY, ETIOLOGY OF CARDIAC VALVE DISEASE UNSPECIFIED: ICD-10-CM

## 2024-05-31 DIAGNOSIS — I77.810 ASCENDING AORTA DILATION (CMS-HCC): ICD-10-CM

## 2024-05-31 LAB
AORTIC VALVE MEAN GRADIENT: 10.1 MMHG
AORTIC VALVE PEAK VELOCITY: 2.34 M/S
AV PEAK GRADIENT: 22 MMHG
AVA (PEAK VEL): 2.16 CM2
AVA (VTI): 2.08 CM2
EJECTION FRACTION APICAL 4 CHAMBER: 59.1
LEFT ATRIUM VOLUME AREA LENGTH INDEX BSA: 62.9 ML/M2
LEFT VENTRICLE INTERNAL DIMENSION DIASTOLE: 4.24 CM (ref 3.5–6)
LEFT VENTRICULAR OUTFLOW TRACT DIAMETER: 2.05 CM
LV EJECTION FRACTION BIPLANE: 56 %
MITRAL VALVE E/A RATIO: 0.47
MITRAL VALVE E/E' RATIO: 7.98
RIGHT VENTRICLE FREE WALL PEAK S': 18 CM/S
RIGHT VENTRICLE PEAK SYSTOLIC PRESSURE: 33.6 MMHG
TRICUSPID ANNULAR PLANE SYSTOLIC EXCURSION: 2.4 CM

## 2024-05-31 PROCEDURE — 93306 TTE W/DOPPLER COMPLETE: CPT

## 2024-05-31 PROCEDURE — 93306 TTE W/DOPPLER COMPLETE: CPT | Performed by: INTERNAL MEDICINE

## 2024-07-15 NOTE — PROGRESS NOTES
Referral from Dr. Naylor. Kiley comes to discuss treatment recommendations for dilated ascending aorta. Tootie Roca RN    Patient comes to clinic to discuss treatment options of aortic root, ascending aorta, and proximal aortic arch aneurysm.  Patient is asymptomatic.  Patient does not have a history of hypertension or family history of aortic aneurysm, dissection, or sudden death.  Patient underwent a coronary calcium score and was found to have a dilated ascending aorta.  She therefore underwent a study dedicated to her aorta and the findings were maximal diameter of aortic root of 4.1 cm, ascending aorta of 5.2 and 4 cm proximal aortic arch.  Echocardiography demonstrates mild aortic insufficiency.    Since aorta is greater than 5 cm in diameter, recommend median sternotomy, and replacement of aortic root, ascending aorta, and proximal arch with dacryon graft.  This will require resuspension of her aortic valve in the aortic root as well as circulatory arrest with hypothermic retrograde perfusion to replace her proximal arch.  Risks, benefits, and alternatives discussed with patient and , who will think about procedure.  Patient will need coronary catheterization prior to surgery if they decide to proceed.

## 2024-07-18 ENCOUNTER — OFFICE VISIT (OUTPATIENT)
Dept: CARDIAC SURGERY | Facility: HOSPITAL | Age: 69
End: 2024-07-18
Payer: MEDICARE

## 2024-07-18 VITALS
WEIGHT: 152.8 LBS | SYSTOLIC BLOOD PRESSURE: 133 MMHG | OXYGEN SATURATION: 98 % | DIASTOLIC BLOOD PRESSURE: 87 MMHG | HEART RATE: 78 BPM | BODY MASS INDEX: 26.09 KG/M2 | HEIGHT: 64 IN

## 2024-07-18 DIAGNOSIS — I71.21 ANEURYSM OF ASCENDING AORTA WITHOUT RUPTURE (CMS-HCC): ICD-10-CM

## 2024-07-18 PROCEDURE — 1159F MED LIST DOCD IN RCRD: CPT | Performed by: THORACIC SURGERY (CARDIOTHORACIC VASCULAR SURGERY)

## 2024-07-18 PROCEDURE — 99205 OFFICE O/P NEW HI 60 MIN: CPT | Performed by: THORACIC SURGERY (CARDIOTHORACIC VASCULAR SURGERY)

## 2024-07-18 PROCEDURE — 3008F BODY MASS INDEX DOCD: CPT | Performed by: THORACIC SURGERY (CARDIOTHORACIC VASCULAR SURGERY)

## 2024-07-18 PROCEDURE — 1126F AMNT PAIN NOTED NONE PRSNT: CPT | Performed by: THORACIC SURGERY (CARDIOTHORACIC VASCULAR SURGERY)

## 2024-07-18 PROCEDURE — 99215 OFFICE O/P EST HI 40 MIN: CPT | Performed by: THORACIC SURGERY (CARDIOTHORACIC VASCULAR SURGERY)

## 2024-07-18 PROCEDURE — 1036F TOBACCO NON-USER: CPT | Performed by: THORACIC SURGERY (CARDIOTHORACIC VASCULAR SURGERY)

## 2024-07-18 ASSESSMENT — COLUMBIA-SUICIDE SEVERITY RATING SCALE - C-SSRS
1. IN THE PAST MONTH, HAVE YOU WISHED YOU WERE DEAD OR WISHED YOU COULD GO TO SLEEP AND NOT WAKE UP?: NO
6. HAVE YOU EVER DONE ANYTHING, STARTED TO DO ANYTHING, OR PREPARED TO DO ANYTHING TO END YOUR LIFE?: NO
2. HAVE YOU ACTUALLY HAD ANY THOUGHTS OF KILLING YOURSELF?: NO

## 2024-07-18 ASSESSMENT — PATIENT HEALTH QUESTIONNAIRE - PHQ9
1. LITTLE INTEREST OR PLEASURE IN DOING THINGS: NOT AT ALL
SUM OF ALL RESPONSES TO PHQ9 QUESTIONS 1 AND 2: 0
2. FEELING DOWN, DEPRESSED OR HOPELESS: NOT AT ALL

## 2024-07-18 ASSESSMENT — PAIN SCALES - GENERAL: PAINLEVEL: 0-NO PAIN

## 2024-07-23 ENCOUNTER — PREP FOR PROCEDURE (OUTPATIENT)
Dept: CARDIOLOGY | Facility: HOSPITAL | Age: 69
End: 2024-07-23
Payer: MEDICARE

## 2024-07-23 DIAGNOSIS — I71.21 ANEURYSM OF ASCENDING AORTA WITHOUT RUPTURE (CMS-HCC): ICD-10-CM

## 2024-07-23 DIAGNOSIS — I71.21 ASCENDING AORTIC ANEURYSM, UNSPECIFIED WHETHER RUPTURED (CMS-HCC): Primary | ICD-10-CM

## 2024-07-23 RX ORDER — SODIUM CHLORIDE, SODIUM LACTATE, POTASSIUM CHLORIDE, CALCIUM CHLORIDE 600; 310; 30; 20 MG/100ML; MG/100ML; MG/100ML; MG/100ML
20 INJECTION, SOLUTION INTRAVENOUS CONTINUOUS
OUTPATIENT
Start: 2024-07-23

## 2024-07-31 ENCOUNTER — TELEMEDICINE (OUTPATIENT)
Dept: PRIMARY CARE | Facility: CLINIC | Age: 69
End: 2024-07-31
Payer: MEDICARE

## 2024-07-31 DIAGNOSIS — Z85.3 HISTORY OF BREAST CANCER IN FEMALE: ICD-10-CM

## 2024-07-31 DIAGNOSIS — N61.0 MASTITIS, ACUTE: Primary | ICD-10-CM

## 2024-07-31 PROCEDURE — 1036F TOBACCO NON-USER: CPT | Performed by: STUDENT IN AN ORGANIZED HEALTH CARE EDUCATION/TRAINING PROGRAM

## 2024-07-31 PROCEDURE — 1159F MED LIST DOCD IN RCRD: CPT | Performed by: STUDENT IN AN ORGANIZED HEALTH CARE EDUCATION/TRAINING PROGRAM

## 2024-07-31 PROCEDURE — 99214 OFFICE O/P EST MOD 30 MIN: CPT | Performed by: STUDENT IN AN ORGANIZED HEALTH CARE EDUCATION/TRAINING PROGRAM

## 2024-07-31 RX ORDER — AMOXICILLIN AND CLAVULANATE POTASSIUM 875; 125 MG/1; MG/1
875 TABLET, FILM COATED ORAL 2 TIMES DAILY
Qty: 20 TABLET | Refills: 0 | Status: SHIPPED | OUTPATIENT
Start: 2024-07-31 | End: 2024-08-10

## 2024-07-31 NOTE — PROGRESS NOTES
Subjective   Patient ID: Kiley Ward is a 69 y.o. female who presents for Breast Infection.  This was a virtual encounter    HPI   Mastitis of the right breast  Patient calls today to discuss about her concerns for infection of the right breast.  Stated in the last few days she has been feeling thrombin pain from her right breast and is associated with yellow to brown scant discharge from her inverted nipple.  Patient states that she has inverted nipples at baseline  Denies any skin changes, but admit of having increase tenderness on touch.  Denies any fever of chills or other constitutional symptoms   This is the second time happening to her with a previous history of mastitis 3-4 years ago.  At that time she required 2 courses of antibiotic to recover.  She has a history of bilateral breast cancer and lumpectomy followed with chemotherapy done in 2011.  Denies any chest pain, or unintentional weight loss      Review of Systems  All pertinent positive symptoms are included in the history of present illness.    All other systems have been reviewed and are negative and noncontributory to this patient's current ailments.  Objective   There were no vitals taken for this visit.    Physical Exam  CONSTITUTIONAL - well nourished, well developed, looks like stated age, in no acute distress, not ill-appearing, and not tired appearing  SKIN - normal skin color and pigmentation, normal skin turgor without rash, lesions, or nodules visualized  HEAD - no trauma, normocephalic  EYES - normal external exam  CHEST -no distressed breathing, good effort  EXTREMITIES - no edema, no deformities  NEUROLOGICAL - normal balance, normal motor, no ataxia  PSYCHIATRIC - alert, pleasant and cordial, age-appropriate    Assessment/Plan   Diagnoses and all orders for this visit:  Mastitis, acute/history of breast cancer  -     amoxicillin-pot clavulanate (Augmentin) 875-125 mg tablet; Take 1 tablet (875 mg) by mouth 2 times a day for 10  days.  -     Referral to Breast Surgery; Future  -     BI US breast complete right; Future  Discussed about etiology of your symptoms alongside treatment option.  At this moment I believe this is more consistent with mastitis, according to clinical presentation and history.  I believe cellulitis is less likely at this moment due to no acute skin changes.  Also differential diagnoses include abscess of the right breast as well as reoccurrence of breast neoplasm given the nipple inversion.  To be fully thorough we ordered right breast ultrasound for further investigation.  If breast abscess is present and more than 2 cm in diameter that would warrant I&D.  Also information about breast surgeon was provided to follow-up for her history of breast cancer and possible reevaluation of current symptoms if they do not resolve after the course of antibiotics.  Patient still was instructed to use Tylenol/Motrin for pain or fever and warm compresses in the affected area.    Advised to go to closest emergency department if symptoms are getting worse and she started experiencing new worrisome symptoms.    Thank you for letting us be a part of your care team.  Please call the office if you have further questions or concerns regarding your care      Barrera Yan MD  PGY2, FM Resident

## 2024-08-05 ENCOUNTER — HOSPITAL ENCOUNTER (OUTPATIENT)
Dept: RADIOLOGY | Facility: EXTERNAL LOCATION | Age: 69
Discharge: HOME | End: 2024-08-05

## 2024-08-05 ENCOUNTER — HOSPITAL ENCOUNTER (OUTPATIENT)
Dept: RADIOLOGY | Facility: CLINIC | Age: 69
Discharge: HOME | End: 2024-08-05
Payer: MEDICARE

## 2024-08-05 DIAGNOSIS — N61.0 MASTITIS, ACUTE: ICD-10-CM

## 2024-08-06 ENCOUNTER — APPOINTMENT (OUTPATIENT)
Dept: RADIOLOGY | Facility: HOSPITAL | Age: 69
End: 2024-08-06
Payer: MEDICARE

## 2024-08-07 NOTE — PROGRESS NOTES
Kiley Ward female   1955 69 y.o.   [id number]      Chief Complaint  Right breast mastitis follow up    History Of Present Illness  Kiley Ward is a very pleasant 69 y.o.  female presenting with right breast mastitis. She was recently treated with a  10 day course of Augmentin by her PCP on 2024. She states she had redness to right  lower quadrant of the breast with purulent nipple discharge on expression. States nipple discharge resolved and denies any redness, fevers or chills. Denies bloody nipple discharge. History of right breast cancer treated at [facility] in  s/p lumpectomy and sentinel lymph node biopsy chemotherapy and radiation. History left breast cancer reated at [facility] in  s/p lumpectomy and sentinel lymph node biopsy, chemotherapy and radiation. She never had genetics done. In  she had a bilateral breast reduction. She has family history breast cancer in mother and sister.      BREAST IMAGIN2024 RIGHT diagnostic mammogram and right limited breast ultrasound, BI-RADS Category 2    REPRODUCTIVE HISTORY: menarche age 14, , first birth age 31,  4 months, no OCP's, chemotherapy induced menopause age 57,  no HRT, heterogeneously dense breast tissue                                   FAMILY CANCER HISTORY:    Mother: Breast cancer, age 60  Sister: Breast cancer, age 49 negative genetics   Father: Prostate cancer age 65    Surgical History  She has a past surgical history that includes Other surgical history (2021) and Breast lumpectomy.     Social History  She reports that she has never smoked. She has never used smokeless tobacco. Alcohol use questions deferred to the physician. Drug use questions deferred to the physician.    Family History  Family History   Problem Relation Name Age of Onset    No Known Problems Mother      Glaucoma Neg Hx          Allergies  Patient has no known allergies.    Medications  Current Outpatient Medications   Medication  Instructions    aspirin 81 mg EC tablet oral    calcitriol (Rocaltrol) 0.25 mcg capsule 1 capsule, oral, Daily    CALCIUM CARBONATE-VITAMIN D3 ORAL oral    cholecalciferol (Vitamin D-3) 25 MCG (1000 UT) tablet oral    dexlansoprazole (DEXILANT) 60 mg, oral, Daily    ergocalciferol (Vitamin D-2) 1.25 MG (44893 UT) capsule     exemestane (Aromasin) 25 mg tablet oral, Daily RT         REVIEW OF SYSTEMS    Constitutional:  Negative for appetite change, fatigue, fever and unexpected weight change.   HENT:  Negative for ear pain, hearing loss, nosebleeds, sore throat and trouble swallowing.    Eyes:  Negative for discharge, itching and visual disturbance.   Respiratory:  Negative for cough, chest tightness and shortness of breath.    Cardiovascular:  Negative for chest pain, palpitations and leg swelling.   Breast: as indicated in HPI  Gastrointestinal:  Negative for abdominal pain, constipation, diarrhea and nausea.   Endocrine: Negative for cold intolerance and heat intolerance.   Genitourinary:  Negative for dysuria, frequency, hematuria, pelvic pain and vaginal bleeding.   Musculoskeletal:  Negative for arthralgias, back pain, gait problem, joint swelling and myalgias.   Skin:  Negative for color change and rash.   Allergic/Immunologic: Negative for environmental allergies and food allergies.   Neurological:  Negative for dizziness, tremors, speech difficulty, weakness, numbness and headaches.   Hematological:  Does not bruise/bleed easily.   Psychiatric/Behavioral:  Negative for agitation, dysphoric mood and sleep disturbance. The patient is not nervous/anxious.         Past Medical History  She has a past medical history of Breast cancer (Multi), antineoplastic chemo, Personal history of irradiation, Personal history of malignant neoplasm of breast (02/09/2021), Personal history of malignant neoplasm of breast, and Personal history of other venous thrombosis and embolism (02/09/2021).     Physical Exam  Patient is  alert and oriented x3 and in a relaxed and appropriate mood. Her gait is steady and hand grasps are equal. Sclera is clear. The breasts are asymmetrical, left larger. The right breast well healed superolateral lumpectomy incision and pedicle method incision. Right axilla well healed incision. Left breast well healed pedicle method incision and superolateral lumpectomy incision. Left axilla well healed surgical incision. Left breast 5:00 5 cm from the nipple, 2 x 2 cm palpable mass. Left breast skin with telangiectasias secondary to radiation. The tissue is soft without palpable abnormalities, discrete nodules or masses. The skin and nipples appear normal. There is no cervical, supraclavicular or axillary lymphadenopathy. Heart rate and rhythm normal, S1 and S2 appreciated. The lungs are clear to auscultation bilaterally. Abdomen is soft and non-tender.       Physical Exam  Chest:              Last Recorded Vitals  Vitals:    08/13/24 1047   BP: (!) 185/79   Pulse: 62       Relevant Results   Time was spent viewing digital images of the radiology testing with the patient. I explained the results in depth, along with suggested explanation for follow up recommendations based on the testing results. BI-RADS Category 2    Imaging  Interpreted By:  Justino Butler,   STUDY:  BI MAMMO LEFT DIAGNOSTIC TOMOSYNTHESIS; BI US BREAST LIMITED LEFT;  8/13/2024 12:14 pm; 8/13/2024 12:25 pm      ACCESSION NUMBER(S):  [id number]; [id number]      ORDERING CLINICIAN:  JASS MCNEIL      INDICATION:  The patient presents for palpable lump in the left breast felt by the  patient's provider. History of bilateral breast cancer status post  lumpectomies with radiation therapy and bilateral reduction.      COMPARISON:  02/05/2024, 02/02/2023, 11/10/2021      FINDINGS:  MAMMOGRAPHY: 2D and tomosynthesis images were reviewed at 1 mm slice  thickness.      Density:  The breast tissue is heterogeneously dense, which may  obscure small  masses.      A BB marker was placed by the patient at the site of the patients  palpable lump. An oval circumscribed equal density mass is seen deep  to the marker in the inferior slightly lateral left breast at  anterior depth. Stable postsurgical scarring is seen in the central  lateral left breast at posterior depth. Stable benign post reduction  changes. No suspicious masses or calcifications are identified in the  left breast.      ULTRASOUND:  Targeted ultrasound of the left breast was performed by a registered  sonographer with elastography.      At the 4 o'clock position 4 cm from the nipple an oval parallel  circumscribed avascular hypoechoic mass is seen measuring 1.7 x 0.9 x  1.6 cm. The mass is soft on elastography. Findings correspond to the  mammographic mass.      IMPRESSION:  A benign probable fibroadenoma is seen in the left breast at the site  of the patient's palpable lump which is stable compared to multiple  prior mammograms. No further imaging follow-up is required. No  mammographic evidence of malignancy in the left breast. Patient to  return to annual screening.      BI-RADS CATEGORY:      BI-RADS Category:  2 Benign.  Recommendation:  Annual Screening.  Recommended Date:  1 Year.  Laterality:  Bilateral.       Assessment/Plan       PLAN:  Normal clinical exam and imaging, bilateral breast cancer, heterogeneously dense breast tissue.        Patient Discussion/Summary  Your clinical examination and imaging are normal. Please return in one year for bilateral screening mammogram and office visit or sooner if you have any problems or concerns.     You can see your health information, review clinical summaries from office visits & test results online when you follow your health with MY  Chart, a personal health record. To sign up go to [email or web address]. If you need assistance with signing up or trouble getting into your account call [employer] Patient Line 24/7 at [phone].    My  office phone number is [phone] if you need to get in touch with me or have additional questions or concerns. Thank you for choosing [facility] and trusting me as your healthcare provider. I look forward to seeing you again at your next office visit. I am honored to be a provider on your health care team and I remain dedicated to helping you achieve your health goals.      Nicky Mayers, APRN-CNP

## 2024-08-08 ENCOUNTER — PREP FOR PROCEDURE (OUTPATIENT)
Dept: CARDIOLOGY | Facility: HOSPITAL | Age: 69
End: 2024-08-08
Payer: MEDICARE

## 2024-08-08 DIAGNOSIS — I71.21 ANEURYSM OF ASCENDING AORTA WITHOUT RUPTURE (CMS-HCC): Primary | ICD-10-CM

## 2024-08-08 DIAGNOSIS — R93.89 ABNORMAL FINDINGS ON DIAGNOSTIC IMAGING OF OTHER SPECIFIED BODY STRUCTURES: ICD-10-CM

## 2024-08-08 RX ORDER — SODIUM CHLORIDE 9 MG/ML
100 INJECTION, SOLUTION INTRAVENOUS CONTINUOUS
OUTPATIENT
Start: 2024-08-08

## 2024-08-09 ENCOUNTER — HOSPITAL ENCOUNTER (OUTPATIENT)
Dept: RADIOLOGY | Facility: CLINIC | Age: 69
Discharge: HOME | End: 2024-08-09
Payer: MEDICARE

## 2024-08-09 DIAGNOSIS — N61.0 MASTITIS, ACUTE: ICD-10-CM

## 2024-08-09 PROCEDURE — 76981 USE PARENCHYMA: CPT | Mod: RT

## 2024-08-09 PROCEDURE — 77061 BREAST TOMOSYNTHESIS UNI: CPT | Mod: RT

## 2024-08-09 PROCEDURE — 76642 ULTRASOUND BREAST LIMITED: CPT | Mod: RT

## 2024-08-11 NOTE — RESULT ENCOUNTER NOTE
Mammogram shows a BI-RADS Category 2, benign    It recommends that we follow-up for annual screening    No evidence of any abscesses at this time    I would still recommend she follows up with the breast specialty provider to discuss this further

## 2024-08-13 ENCOUNTER — OFFICE VISIT (OUTPATIENT)
Dept: SURGICAL ONCOLOGY | Facility: CLINIC | Age: 69
End: 2024-08-13
Payer: MEDICARE

## 2024-08-13 ENCOUNTER — HOSPITAL ENCOUNTER (OUTPATIENT)
Dept: RADIOLOGY | Facility: CLINIC | Age: 69
Discharge: HOME | End: 2024-08-13
Payer: MEDICARE

## 2024-08-13 VITALS
HEART RATE: 62 BPM | BODY MASS INDEX: 26.09 KG/M2 | SYSTOLIC BLOOD PRESSURE: 185 MMHG | WEIGHT: 152 LBS | DIASTOLIC BLOOD PRESSURE: 79 MMHG

## 2024-08-13 VITALS — BODY MASS INDEX: 25.93 KG/M2 | WEIGHT: 151.9 LBS | HEIGHT: 64 IN

## 2024-08-13 DIAGNOSIS — Z12.31 ENCOUNTER FOR SCREENING MAMMOGRAM FOR MALIGNANT NEOPLASM OF BREAST: ICD-10-CM

## 2024-08-13 DIAGNOSIS — N63.23 MASS OF LOWER OUTER QUADRANT OF LEFT BREAST: Primary | ICD-10-CM

## 2024-08-13 DIAGNOSIS — N63.23 MASS OF LOWER OUTER QUADRANT OF LEFT BREAST: ICD-10-CM

## 2024-08-13 DIAGNOSIS — N61.0 MASTITIS, ACUTE: ICD-10-CM

## 2024-08-13 PROCEDURE — G0279 TOMOSYNTHESIS, MAMMO: HCPCS | Mod: LEFT SIDE | Performed by: STUDENT IN AN ORGANIZED HEALTH CARE EDUCATION/TRAINING PROGRAM

## 2024-08-13 PROCEDURE — 76642 ULTRASOUND BREAST LIMITED: CPT | Mod: LEFT SIDE | Performed by: STUDENT IN AN ORGANIZED HEALTH CARE EDUCATION/TRAINING PROGRAM

## 2024-08-13 PROCEDURE — 1160F RVW MEDS BY RX/DR IN RCRD: CPT

## 2024-08-13 PROCEDURE — 76642 ULTRASOUND BREAST LIMITED: CPT | Mod: LT

## 2024-08-13 PROCEDURE — 76982 USE 1ST TARGET LESION: CPT | Mod: LT,RT

## 2024-08-13 PROCEDURE — 77065 DX MAMMO INCL CAD UNI: CPT | Mod: LEFT SIDE | Performed by: STUDENT IN AN ORGANIZED HEALTH CARE EDUCATION/TRAINING PROGRAM

## 2024-08-13 PROCEDURE — 99204 OFFICE O/P NEW MOD 45 MIN: CPT

## 2024-08-13 PROCEDURE — 1036F TOBACCO NON-USER: CPT

## 2024-08-13 PROCEDURE — 99214 OFFICE O/P EST MOD 30 MIN: CPT

## 2024-08-13 PROCEDURE — 1159F MED LIST DOCD IN RCRD: CPT

## 2024-08-13 PROCEDURE — 77061 BREAST TOMOSYNTHESIS UNI: CPT | Mod: LT

## 2024-08-13 PROCEDURE — 1126F AMNT PAIN NOTED NONE PRSNT: CPT

## 2024-08-13 ASSESSMENT — PAIN SCALES - GENERAL: PAINLEVEL: 0-NO PAIN

## 2024-08-13 NOTE — PATIENT INSTRUCTIONS
Your clinical examination and imaging are normal. Please return in one year for bilateral screening mammogram and office visit or sooner if you have any problems or concerns.     You can see your health information, review clinical summaries from office visits & test results online when you follow your health with MY  Chart, a personal health record. To sign up go to [email or web address]. If you need assistance with signing up or trouble getting into your account call [employer] Patient Line 24/7 at [phone].    My office phone number is [phone] if you need to get in touch with me or have additional questions or concerns. Thank you for choosing [facility] and trusting me as your healthcare provider. I look forward to seeing you again at your next office visit. I am honored to be a provider on your health care team and I remain dedicated to helping you achieve your health goals.

## 2024-08-26 ENCOUNTER — APPOINTMENT (OUTPATIENT)
Dept: OPHTHALMOLOGY | Facility: CLINIC | Age: 69
End: 2024-08-26
Payer: MEDICARE

## 2024-08-26 DIAGNOSIS — E55.9 VITAMIN D DEFICIENCY: ICD-10-CM

## 2024-08-26 DIAGNOSIS — H43.813 PVD (POSTERIOR VITREOUS DETACHMENT), BOTH EYES: ICD-10-CM

## 2024-08-26 DIAGNOSIS — H35.30 ARMD (AGE RELATED MACULAR DEGENERATION): Primary | ICD-10-CM

## 2024-08-26 PROCEDURE — 92134 CPTRZ OPH DX IMG PST SGM RTA: CPT | Performed by: OPHTHALMOLOGY

## 2024-08-26 PROCEDURE — 99213 OFFICE O/P EST LOW 20 MIN: CPT | Performed by: OPHTHALMOLOGY

## 2024-08-26 ASSESSMENT — CUP TO DISC RATIO
OD_RATIO: 0.3
OS_RATIO: 0.5

## 2024-08-26 ASSESSMENT — TONOMETRY
OD_IOP_MMHG: 14
OS_IOP_MMHG: 15
IOP_METHOD: GOLDMANN APPLANATION

## 2024-08-26 ASSESSMENT — ENCOUNTER SYMPTOMS
PSYCHIATRIC NEGATIVE: 0
ALLERGIC/IMMUNOLOGIC NEGATIVE: 0
CONSTITUTIONAL NEGATIVE: 0
HEMATOLOGIC/LYMPHATIC NEGATIVE: 0
GASTROINTESTINAL NEGATIVE: 0
MUSCULOSKELETAL NEGATIVE: 0
EYES NEGATIVE: 1
CARDIOVASCULAR NEGATIVE: 0
RESPIRATORY NEGATIVE: 0
ENDOCRINE NEGATIVE: 0
NEUROLOGICAL NEGATIVE: 0

## 2024-08-26 ASSESSMENT — SLIT LAMP EXAM - LIDS
COMMENTS: NORMAL
COMMENTS: NORMAL

## 2024-08-26 ASSESSMENT — VISUAL ACUITY
OD_SC: 20/30
OS_SC: 20/30
METHOD: SNELLEN - LINEAR
OS_SC+: -2

## 2024-08-26 ASSESSMENT — PACHYMETRY
OD_CT(UM): 584
OS_CT(UM): 597

## 2024-08-26 ASSESSMENT — EXTERNAL EXAM - LEFT EYE: OS_EXAM: NORMAL

## 2024-08-26 ASSESSMENT — EXTERNAL EXAM - RIGHT EYE: OD_EXAM: NORMAL

## 2024-08-26 NOTE — PROGRESS NOTES
Assessment/Plan   Diagnoses and all orders for this visit:  ARMD (age related macular degeneration)  -     OCT, Retina - OU - Both Eyes  PVD (posterior vitreous detachment), both eyes  Vitamin D deficiency    No history of checkpoint inhibitors  Had breast cancer    1. Importance of avoiding situations of risk for eye injury and of routine use of appropriate safety eye protection discussed with patient and emphasized.  2. Diet and lifestyle precautions regarding age-related macular degeneration discussed.  Have recommended AREDS 2 vitamins, refraining from smoking, UV protection, and self-monitoring of vision. Patient advised to seek ophthalmic follow-up promptly if there are significant changes in vision.  3. Follow up retina        DIAGNOSTIC PROCEDURE DONE    OCT DONE OD/OS            REASON FOR TEST: will help address and tailor  therapy by detecting subclinical CME SRF     Hi quality OCT  scans obtained  signal good    OCT OD - Normal Foveal Contour, No Edema, IS/OS Junction Normal Erm  OCT OS - Normal Foveal Contour, No Edema, IS/OS Junction Normal erm    additional commnents:    She has few drusen in both eyes as well as fine epiretinal membrane (ERM) in both eyes  Vision is excellent  Follow up 6 months

## 2024-09-10 DIAGNOSIS — K21.9 GASTROESOPHAGEAL REFLUX DISEASE, UNSPECIFIED WHETHER ESOPHAGITIS PRESENT: ICD-10-CM

## 2024-09-10 RX ORDER — DEXLANSOPRAZOLE 60 MG/1
1 CAPSULE, DELAYED RELEASE ORAL DAILY
Qty: 30 CAPSULE | Refills: 0 | Status: SHIPPED | OUTPATIENT
Start: 2024-09-10

## 2024-09-25 ENCOUNTER — LAB (OUTPATIENT)
Dept: LAB | Facility: LAB | Age: 69
End: 2024-09-25
Payer: MEDICARE

## 2024-09-25 ENCOUNTER — OFFICE VISIT (OUTPATIENT)
Dept: CARDIOLOGY | Facility: HOSPITAL | Age: 69
End: 2024-09-25
Payer: MEDICARE

## 2024-09-25 VITALS
OXYGEN SATURATION: 97 % | HEIGHT: 64 IN | SYSTOLIC BLOOD PRESSURE: 124 MMHG | DIASTOLIC BLOOD PRESSURE: 84 MMHG | BODY MASS INDEX: 26.26 KG/M2 | HEART RATE: 67 BPM | WEIGHT: 153.8 LBS

## 2024-09-25 DIAGNOSIS — I71.21 ANEURYSM OF ASCENDING AORTA WITHOUT RUPTURE (CMS-HCC): ICD-10-CM

## 2024-09-25 DIAGNOSIS — I10 PRIMARY HYPERTENSION: Primary | ICD-10-CM

## 2024-09-25 DIAGNOSIS — I10 PRIMARY HYPERTENSION: ICD-10-CM

## 2024-09-25 LAB
ANION GAP SERPL CALC-SCNC: 13 MMOL/L (ref 10–20)
BUN SERPL-MCNC: 17 MG/DL (ref 6–23)
CALCIUM SERPL-MCNC: 9.2 MG/DL (ref 8.6–10.3)
CHLORIDE SERPL-SCNC: 104 MMOL/L (ref 98–107)
CO2 SERPL-SCNC: 27 MMOL/L (ref 21–32)
CREAT SERPL-MCNC: 0.83 MG/DL (ref 0.5–1.05)
EGFRCR SERPLBLD CKD-EPI 2021: 77 ML/MIN/1.73M*2
ERYTHROCYTE [DISTWIDTH] IN BLOOD BY AUTOMATED COUNT: 13.2 % (ref 11.5–14.5)
GLUCOSE SERPL-MCNC: 94 MG/DL (ref 74–99)
HCT VFR BLD AUTO: 40 % (ref 36–46)
HGB BLD-MCNC: 12.9 G/DL (ref 12–16)
MCH RBC QN AUTO: 29.9 PG (ref 26–34)
MCHC RBC AUTO-ENTMCNC: 32.3 G/DL (ref 32–36)
MCV RBC AUTO: 93 FL (ref 80–100)
NRBC BLD-RTO: 0 /100 WBCS (ref 0–0)
PLATELET # BLD AUTO: 314 X10*3/UL (ref 150–450)
POTASSIUM SERPL-SCNC: 4.1 MMOL/L (ref 3.5–5.3)
RBC # BLD AUTO: 4.31 X10*6/UL (ref 4–5.2)
SODIUM SERPL-SCNC: 140 MMOL/L (ref 136–145)
TSH SERPL-ACNC: 0.7 MIU/L (ref 0.44–3.98)
WBC # BLD AUTO: 5.4 X10*3/UL (ref 4.4–11.3)

## 2024-09-25 PROCEDURE — 1160F RVW MEDS BY RX/DR IN RCRD: CPT | Performed by: INTERNAL MEDICINE

## 2024-09-25 PROCEDURE — 3074F SYST BP LT 130 MM HG: CPT | Performed by: INTERNAL MEDICINE

## 2024-09-25 PROCEDURE — 99214 OFFICE O/P EST MOD 30 MIN: CPT | Performed by: INTERNAL MEDICINE

## 2024-09-25 PROCEDURE — 36415 COLL VENOUS BLD VENIPUNCTURE: CPT

## 2024-09-25 PROCEDURE — G2211 COMPLEX E/M VISIT ADD ON: HCPCS | Performed by: INTERNAL MEDICINE

## 2024-09-25 PROCEDURE — 99204 OFFICE O/P NEW MOD 45 MIN: CPT | Performed by: INTERNAL MEDICINE

## 2024-09-25 PROCEDURE — 85027 COMPLETE CBC AUTOMATED: CPT

## 2024-09-25 PROCEDURE — 84443 ASSAY THYROID STIM HORMONE: CPT

## 2024-09-25 PROCEDURE — 93010 ELECTROCARDIOGRAM REPORT: CPT | Performed by: INTERNAL MEDICINE

## 2024-09-25 PROCEDURE — 3079F DIAST BP 80-89 MM HG: CPT | Performed by: INTERNAL MEDICINE

## 2024-09-25 PROCEDURE — 1159F MED LIST DOCD IN RCRD: CPT | Performed by: INTERNAL MEDICINE

## 2024-09-25 PROCEDURE — 80048 BASIC METABOLIC PNL TOTAL CA: CPT

## 2024-09-25 PROCEDURE — 3008F BODY MASS INDEX DOCD: CPT | Performed by: INTERNAL MEDICINE

## 2024-09-25 RX ORDER — CARVEDILOL 6.25 MG/1
6.25 TABLET ORAL
Qty: 180 TABLET | Refills: 3 | Status: SHIPPED | OUTPATIENT
Start: 2024-09-25 | End: 2025-09-25

## 2024-09-25 RX ORDER — FAMOTIDINE 40 MG/1
40 TABLET, FILM COATED ORAL DAILY
COMMUNITY

## 2024-09-25 NOTE — PROGRESS NOTES
Primary Care Physician: Eloise Naylor DO  Date of Visit: 2024  8:20 AM EDT  Location of visit: [facility]     Chief Complaint:   No chief complaint on file.       HPI / Summary:   Kiley Ward is a 69 y.o. female presents to establish cardiovascular care.  She is a pleasant 69-year-old white female with a past medical history significant for ascending aortic aneurysm with a trileaflet aortic valve, hyperlipidemia with a CT calcium score of 0 in , prior left upper extremity DVT, and bilateral breast cancer.  She is asymptomatic from a cardiac perspective.  She exercises 3 days a week using an elliptical machine or walking for up to 45 minutes without chest pain or shortness of breath.  The patient denies chest pain, shortness of breath, palpitations, lightheadedness, syncope, orthopnea, paroxysmal nocturnal dyspnea, lower extremity edema, or bleeding problems.    She had a routine screening CT calcium score that incidentally found an ascending aortic aneurysm.  She is scheduled to have surgery in November.    The patient has no prior history of coronary artery disease, cerebrovascular disease, venous thromboembolic disease, diabetes, hypertension, peripheral arterial disease/claudication, or bleeding.    She was diagnosed with right-sided breast cancer in  and underwent lumpectomy with radiation and chemotherapy that included Herceptin.  She then had left-sided breast cancer diagnosed in  and underwent a lumpectomy, chemotherapy, and radiation therapy.    She reportedly had a left upper extremity DVT in approximately  that was incidentally discovered during a thyroid ultrasound.  She was treated with Eliquis for 6 months.    She has no known drug allergies.    She is a lifelong non-smoker.  She drinks 1 glass of wine daily.  She denies any illicit drug use.  She is a retired special .  She lives at home with her .    Her paternal uncle  suddenly at the age of  "54.  No known family history of coronary artery disease or aneurysm.          Past Medical History:   Diagnosis Date    Breast cancer (Multi)     Hx antineoplastic chemo     Personal history of irradiation     Personal history of malignant neoplasm of breast 2010    History of malignant neoplasm of female right breast    Personal history of malignant neoplasm of breast 2014    History of malignant neoplasm of left breast    Personal history of other venous thrombosis and embolism 02/09/2021    History of deep venous thrombosis        Past Surgical History:   Procedure Laterality Date    BREAST LUMPECTOMY      OTHER SURGICAL HISTORY  02/09/2021    Lumpectomy          Social History:   reports that she has never smoked. She has never used smokeless tobacco. Alcohol use questions deferred to the physician. Drug use questions deferred to the physician.     Family History:  family history includes Breast cancer in her mother and sister; Prostate cancer in her father.      Allergies:  No Known Allergies    Outpatient Medications:  Current Outpatient Medications   Medication Instructions    aspirin 81 mg EC tablet oral    calcitriol (Rocaltrol) 0.25 mcg capsule 1 capsule, oral, Daily    CALCIUM CARBONATE-VITAMIN D3 ORAL oral    cholecalciferol (Vitamin D-3) 25 MCG (1000 UT) tablet oral    dexlansoprazole (DEXILANT) 60 mg, oral, Daily    ergocalciferol (Vitamin D-2) 1.25 MG (72312 UT) capsule     exemestane (Aromasin) 25 mg tablet oral, Daily RT    famotidine (PEPCID) 40 mg, oral, Daily    mv-min/FA/vit K/lutein/zeaxant (PRESERVISION AREDS 2 PLUS MV ORAL) 1 capsule, oral, Daily       Physical Exam:  Vitals:    09/25/24 0817   BP: 124/84   BP Location: Left arm   Patient Position: Sitting   Pulse: 67   SpO2: 97%   Weight: 69.8 kg (153 lb 12.8 oz)   Height: 1.626 m (5' 4\")     Wt Readings from Last 5 Encounters:   09/25/24 69.8 kg (153 lb 12.8 oz)   08/13/24 68.9 kg (152 lb)   08/13/24 68.9 kg (151 lb 14.4 oz)   07/18/24 69.3 " "kg (152 lb 12.8 oz)   05/03/24 71.7 kg (158 lb)     Body mass index is 26.4 kg/m².   General: Well-developed well-nourished in no acute distress  HEENT: Normocephalic atraumatic  Neck: Supple, JVP is normal negative hepatojugular reflux 2+ carotid pulses without bruit  Pulmonary: Normal respiratory effort, clear to auscultation  Cardiovascular: No right ventricular heave, normal S1 and S2, 2 out of 6 early peaking systolic ejection murmur 1 out of 4 blowing diastolic murmur no rubs or gallops  Abdomen: Soft nontender nondistended  Extremities: Warm without edema 2+ radial pulses bilaterally 2+ dorsalis pedis pulses bilaterally  Neurologic: Alert and oriented x3  Psychiatric: Normal mood and affect     Last Labs:  CMP:  Recent Labs     05/23/24  1035 02/23/24  0759 10/18/22  0903    138 139   K 3.7 3.9 3.7    104 105   CO2 28 28 27   ANIONGAP 11 10 11   BUN 17 20 18   CREATININE 0.80 0.95 0.85   EGFR 81 66  --    GLUCOSE 97 83 89     Recent Labs     05/23/24  1035 02/23/24  0759 10/18/22  0903   ALBUMIN 4.6 4.4 4.5   ALKPHOS 70 66 66   ALT 19 18 17   AST 22 22 21   BILITOT 0.6 0.6 0.7     CBC:  Recent Labs     10/18/22  0903 03/18/21  0856   WBC 4.8 5.0   HGB 13.2 12.9   HCT 40.2 40.1    279   MCV 95 97     COAG: No results for input(s): \"INR\", \"DDIMERVTE\" in the last 48454 hours.  HEME/ENDO:  Recent Labs     10/18/22  0903   HGBA1C 5.6      CARDIAC: No results for input(s): \"LDH\", \"CKMB\", \"TROPHS\", \"BNP\" in the last 35791 hours.    No lab exists for component: \"CK\", \"CKMBP\"  Recent Labs     05/23/24  1035 02/23/24  0759 10/18/22  0903 03/18/21  0856   CHOL 177 200* 186 199   LDLF  --   --  112* 122*   LDLCALC 103* 120*  --   --    HDL 59.4 65.2 61.2 62.8   TRIG 74 72 64 69       Last Cardiology Tests:  ECG:  Electrocardiogram performed today that I reviewed shows normal sinus rhythm left axis deviation voltage criteria for LVH.    Echo:  Echo Results:  Transthoracic Echo (TTE) Complete " 05/31/2024    [facility], [address], [place], [place] [address]  Tel [phone] and Fax [phone]    TRANSTHORACIC ECHOCARDIOGRAM REPORT      Patient Name:      DUTCH FITZGERALD       Mira Physician:    [id number] Fortino Marcelino MD  Study Date:        5/31/2024            Ordering Provider:    [id number] RAHEL BENJAMIN  MRN/PID:           [id number]             Fellow:  Accession#:        [id number]         Nurse:  Date of Birth/Age: 1955 / 69 years Sonographer:          Paris AYALA  Gender:            F                    Additional Staff:  Height:            162.56 cm            Admit Date:  Weight:            68.04 kg             Admission Status:     Outpatient  BSA / BMI:         1.73 m2 / 25.75      Encounter#:           [id number]  kg/m2  Department Location:  [facility]  Blood Pressure: 167 /95 mmHg    Study Type:    TRANSTHORACIC ECHO (TTE) COMPLETE  Diagnosis/ICD: Ascending aorta dilatation-I77.810; Nonrheumatic aortic (valve)  insufficiency-I35.1  Indication:    Ascending Ao dilation, AI  CPT Code:      Echo Complete w Full Doppler-20302    Patient History:  Pertinent History: Ascending Ao dilation, AI, GERD, Hyperthyroidism,  Diverticular disease, short- segment Crocker's esophagus, H/o  Breast CA, h/o CHEMO and Radiation (2011, 2015), murmur.    Study Detail: The following Echo studies were performed: 2D, M-Mode, Doppler and  color flow.      PHYSICIAN INTERPRETATION:  Left Ventricle: The left ventricular systolic function is low normal, with an estimated ejection fraction of 50-55%. There are no regional wall motion abnormalities. The left ventricular cavity size is normal. Spectral Doppler shows an impaired relaxation pattern of left ventricular diastolic filling.  Left Atrium: The left atrium is mild to moderately dilated.  Right Ventricle: The right ventricle is normal in size. There is normal right ventricular global systolic function.  Right Atrium:  The right atrium is normal in size.  Aortic Valve: The aortic valve appears structurally normal. There is evidence of mild aortic valve stenosis.  There is mild aortic valve regurgitation. The peak instantaneous gradient of the aortic valve is 22.0 mmHg. The mean gradient of the aortic valve is 10.1 mmHg.  Mitral Valve: The mitral valve is normal in structure. There is trace to mild mitral valve regurgitation.  Tricuspid Valve: The tricuspid valve is structurally normal. There is mild tricuspid regurgitation. The Doppler estimated RVSP is mildly elevated at 33.6 mmHg.  Pulmonic Valve: The pulmonic valve is structurally normal. There is trace pulmonic valve regurgitation.  Pericardium: There is a trivial pericardial effusion.  Aorta: The aortic root is abnormal. There is moderate dilatation of the ascending aorta. There is mild dilatation of the aortic root.  Systemic Veins: The inferior vena cava appears to be of normal size. There is IVC inspiratory collapse greater than 50%.  In comparison to the previous echocardiogram(s): There are no prior studies on this patient for comparison purposes.      CONCLUSIONS:  1. Left ventricular systolic function is low normal with a 50-55% estimated ejection fraction.  2. Spectral Doppler shows an impaired relaxation pattern of left ventricular diastolic filling.  3. The left atrium is mild to moderately dilated.  4. Mildly elevated RVSP.  5. Mild aortic valve stenosis.  6. Mild aortic valve regurgitation.  7. There is moderate dilatation of the ascending aorta.    QUANTITATIVE DATA SUMMARY:  2D MEASUREMENTS:  Normal Ranges:  Ao Root d:     3.80 cm   (2.0-3.7cm)  LAs:           3.79 cm   (2.7-4.0cm)  IVSd:          0.90 cm   (0.6-1.1cm)  LVPWd:         0.77 cm   (0.6-1.1cm)  LVIDd:         4.24 cm   (3.9-5.9cm)  LVIDs:         3.51 cm  LV Mass Index: 63.1 g/m2  LV % FS        17.3 %    LA VOLUME:  Normal Ranges:  LA Vol A4C:        81.6 ml    (22+/-6mL/m2)  LA Vol A2C:         133.9 ml  LA Vol BP:         108.8 ml  LA Vol Index A4C:  47.1 ml/m2  LA Vol Index A2C:  77.4 ml/m2  LA Vol Index BP:   62.9 ml/m2  LA Area A4C:       24.0 cm2  LA Area A2C:       32.0 cm2  LA Major Axis A4C: 6.0 cm  LA Major Axis A2C: 6.5 cm  LA Volume Index:   62.0 ml/m2  LA Vol A4C:        78.7 ml  LA Vol A2C:        123.3 ml    RA VOLUME BY A/L METHOD:  Normal Ranges:  RA Vol A4C:        60.7 ml    (8.3-19.5ml)  RA Vol Index A4C:  35.1 ml/m2  RA Area A4C:       20.0 cm2  RA Major Axis A4C: 5.6 cm    M-MODE MEASUREMENTS:  Normal Ranges:  Ao Root: 4.20 cm (2.0-3.7cm)  LAs:     3.32 cm (2.7-4.0cm)    AORTA MEASUREMENTS:  Normal Ranges:  Asc Ao, d: 4.50 cm (2.1-3.4cm)  Ao Arch:   3.40 cm (2.0-3.6cm)    LV SYSTOLIC FUNCTION BY 2D PLANIMETRY (MOD):  Normal Ranges:  EF-A4C View: 59.1 % (>=55%)  EF-A2C View: 54.3 %  EF-Biplane:  56.2 %    LV DIASTOLIC FUNCTION:  Normal Ranges:  MV Peak E:        0.52 m/s    (0.7-1.2 m/s)  MV Peak A:        1.09 m/s    (0.42-0.7 m/s)  E/A Ratio:        0.47        (1.0-2.2)  MV e'             0.06 m/s    (>8.0)  MV lateral e'     0.11 m/s  MV medial e'      0.09 m/s  MV A Dur:         125.59 msec  E/e' Ratio:       7.98        (<8.0)  a'                0.14 m/s  PulmV Sys Sathish:    55.13 cm/s  PulmV Carmona Sathish:   35.20 cm/s  PulmV S/D Sathish:    1.57  PulmV A Revs Sathish: 26.43 cm/s  PulmV A Revs Dur: 104.66 msec    MITRAL VALVE:  Normal Ranges:  MV DT: 148 msec (150-240msec)    MITRAL INSUFFICIENCY:  Normal Ranges:  MR VTI:  252.95 cm  MR Vmax: 641.27 cm/s    AORTIC VALVE:  Normal Ranges:  AoV Vmax:                2.34 m/s  (<=1.7m/s)  AoV Peak P.0 mmHg (<20mmHg)  AoV Mean PG:             10.1 mmHg (1.7-11.5mmHg)  LVOT Max Sathish:            1.53 m/s  (<=1.1m/s)  AoV VTI:                 50.03 cm  (18-25cm)  LVOT VTI:                31.38 cm  LVOT Diameter:           2.05 cm   (1.8-2.4cm)  AoV Area, VTI:           2.08 cm2  (2.5-5.5cm2)  AoV Area,Vmax:           2.16 cm2   (2.5-4.5cm2)  AoV Dimensionless Index: 0.63    AORTIC INSUFFICIENCY:  AI Vmax:       5.21 m/s  AI Half-time:  383 msec  AI Decel Time: 1322 msec  AI Decel Rate: 396.39 cm/s2      RIGHT VENTRICLE:  RV Basal 3.90 cm  RV Mid   2.50 cm  RV Major 8.5 cm  TAPSE:   23.7 mm  RV s'    0.18 m/s    TRICUSPID VALVE/RVSP:  Normal Ranges:  Peak TR Velocity: 2.77 m/s  RV Syst Pressure: 33.6 mmHg (< 30mmHg)  IVC Diam:         1.30 cm    PULMONIC VALVE:  Normal Ranges:  PV Accel Time: 140 msec (>120ms)  PV Max Sathish:    1.0 m/s  (0.6-0.9m/s)  PV Max P.0 mmHg    Pulmonary Veins:  PulmV A Revs Dur: 104.66 msec  PulmV A Revs Sathish: 26.43 cm/s  PulmV Carmona Sathish:   35.20 cm/s  PulmV S/D Sathish:    1.57  PulmV Sys Sathish:    55.13 cm/s      [id number] Fortino Marcelino MD  Electronically signed on 2024 at 10:25:09 PM        ** Final **     I reviewed her echocardiogram images.  Her ejection fraction was normal at 55 to 60%.  Aortic valve appeared trileaflet with mild calcification.  There was mild to moderate aortic regurgitation.  There was increased velocities across the aortic valve due to increased flow.    Cath:      Stress Test:  Stress Results:  No results found for this or any previous visit from the past 365 days.         Cardiac Imaging:  CT angio of the chest May 29, 2024  IMPRESSION:  1. Ascending aortic fusiform aneurysm measuring 5.2 cm in its maximum  dimension at the level of the main pulmonary artery and returns to  normal at the level of the mid arch. Recommend follow-up radiation  sparing cardiac MRI and thoracic MRA to assess status of the aortic  valve and size and extent of the aneurysm in approximately 12 months.  2. The sinotubular junction is effaced.  3. The aortic root is dilated.  4. The central pulmonary arteries appear dilated correlate with  elevated right-sided filling pressures.    CT cardiac scoring May 13, 2024  FINDINGS:  The score and distribution of calcium in the coronary arteries is as  follows:      LM  0,  LAD 0,  LCx 0,  RCA 0,      Total 0      The visualized mid/lower ascending thoracic aorta is dilated and  measures 5.2 cm in diameter. Descending thoracic aorta 3 cm. The  heart is normal in size. No pericardial effusion is present.          Assessment/Plan   Diagnoses and all orders for this visit:  Primary hypertension  -     carvedilol (Coreg) 6.25 mg tablet; Take 1 tablet (6.25 mg) by mouth 2 times daily (morning and late afternoon).  -     TSH with reflex to Free T4 if abnormal; Future  Aneurysm of ascending aorta without rupture (CMS-HCC)  -     ECG 12 lead (Clinic Performed)    In summary Ms. Ward is a pleasant 69-year-old white female with a past medical history significant for ascending aortic aneurysm with a trileaflet aortic valve, hyperlipidemia with a CT calcium score of 0 in 2024, prior left upper extremity DVT, and bilateral breast cancer status post prior Herceptin.  She is asymptomatic from a cardiac perspective.  Given her prior history of receiving Herceptin, her elevated blood pressure, and dilated ascending aorta I started carvedilol 6.25 mg twice a day.  She is scheduled to have a cardiac catheterization later this week as part of her preoperative evaluation.  I did educate her with regards to the need for first-degree relatives to be screened for an aortic aneurysm.  We will see her back in follow-up in 3 months.    Orders:  Orders Placed This Encounter   Procedures    TSH with reflex to Free T4 if abnormal    ECG 12 lead (Clinic Performed)      Followup Appts:  Future Appointments   Date Time Provider Department Center   10/10/2024  9:15 AM Ante T Dayday, DO [id number] [facility]   10/10/2024  1:00 PM [facility]SE 02 [facility]   10/17/2024 10:00 AM [facility] ROOM 03 [facility]   12/12/2024  8:15 AM Leon Ramirez MD [id number] [facility]   2/24/2025  8:30 AM Indra Torres MD [id number] [facility]   8/19/2025  9:30 AM [facility] 6 [facility]   8/19/2025 10:30 AM Nicky  YOSVANY Mayers-CNP [id number]           ____________________________________________________________  MD [facility]

## 2024-09-25 NOTE — PATIENT INSTRUCTIONS
Start carvedilol 6.25 mg twice a day.  Check BMP, CBC, and TSH today.  Follow-up for your procedure on Friday.  Follow-up in the office in 3 months.

## 2024-09-27 ENCOUNTER — HOSPITAL ENCOUNTER (OUTPATIENT)
Facility: HOSPITAL | Age: 69
Setting detail: OUTPATIENT SURGERY
Discharge: HOME | End: 2024-09-27
Attending: INTERNAL MEDICINE | Admitting: INTERNAL MEDICINE
Payer: MEDICARE

## 2024-09-27 VITALS
HEART RATE: 62 BPM | RESPIRATION RATE: 20 BRPM | TEMPERATURE: 98.2 F | HEIGHT: 64 IN | OXYGEN SATURATION: 96 % | DIASTOLIC BLOOD PRESSURE: 72 MMHG | WEIGHT: 150 LBS | BODY MASS INDEX: 25.61 KG/M2 | SYSTOLIC BLOOD PRESSURE: 134 MMHG

## 2024-09-27 DIAGNOSIS — I71.21 ANEURYSM OF ASCENDING AORTA WITHOUT RUPTURE (CMS-HCC): Primary | ICD-10-CM

## 2024-09-27 DIAGNOSIS — R93.89 ABNORMAL FINDINGS ON DIAGNOSTIC IMAGING OF OTHER SPECIFIED BODY STRUCTURES: ICD-10-CM

## 2024-09-27 PROCEDURE — 99152 MOD SED SAME PHYS/QHP 5/>YRS: CPT | Performed by: INTERNAL MEDICINE

## 2024-09-27 PROCEDURE — C1769 GUIDE WIRE: HCPCS | Performed by: INTERNAL MEDICINE

## 2024-09-27 PROCEDURE — 93458 L HRT ARTERY/VENTRICLE ANGIO: CPT | Performed by: INTERNAL MEDICINE

## 2024-09-27 PROCEDURE — 2550000001 HC RX 255 CONTRASTS: Performed by: INTERNAL MEDICINE

## 2024-09-27 PROCEDURE — 99153 MOD SED SAME PHYS/QHP EA: CPT | Performed by: INTERNAL MEDICINE

## 2024-09-27 PROCEDURE — 93454 CORONARY ARTERY ANGIO S&I: CPT | Performed by: INTERNAL MEDICINE

## 2024-09-27 PROCEDURE — 2500000005 HC RX 250 GENERAL PHARMACY W/O HCPCS: Performed by: INTERNAL MEDICINE

## 2024-09-27 PROCEDURE — C1894 INTRO/SHEATH, NON-LASER: HCPCS | Performed by: INTERNAL MEDICINE

## 2024-09-27 PROCEDURE — 2500000004 HC RX 250 GENERAL PHARMACY W/ HCPCS (ALT 636 FOR OP/ED): Performed by: INTERNAL MEDICINE

## 2024-09-27 PROCEDURE — 2720000007 HC OR 272 NO HCPCS: Performed by: INTERNAL MEDICINE

## 2024-09-27 PROCEDURE — C1760 CLOSURE DEV, VASC: HCPCS | Performed by: INTERNAL MEDICINE

## 2024-09-27 PROCEDURE — 99222 1ST HOSP IP/OBS MODERATE 55: CPT | Performed by: NURSE PRACTITIONER

## 2024-09-27 PROCEDURE — 2500000004 HC RX 250 GENERAL PHARMACY W/ HCPCS (ALT 636 FOR OP/ED): Performed by: NURSE PRACTITIONER

## 2024-09-27 PROCEDURE — 7100000010 HC PHASE TWO TIME - EACH INCREMENTAL 1 MINUTE: Performed by: INTERNAL MEDICINE

## 2024-09-27 PROCEDURE — 7100000009 HC PHASE TWO TIME - INITIAL BASE CHARGE: Performed by: INTERNAL MEDICINE

## 2024-09-27 RX ORDER — MIDAZOLAM HYDROCHLORIDE 1 MG/ML
INJECTION, SOLUTION INTRAMUSCULAR; INTRAVENOUS AS NEEDED
Status: DISCONTINUED | OUTPATIENT
Start: 2024-09-27 | End: 2024-09-27 | Stop reason: HOSPADM

## 2024-09-27 RX ORDER — VERAPAMIL HYDROCHLORIDE 2.5 MG/ML
INJECTION, SOLUTION INTRAVENOUS AS NEEDED
Status: DISCONTINUED | OUTPATIENT
Start: 2024-09-27 | End: 2024-09-27 | Stop reason: HOSPADM

## 2024-09-27 RX ORDER — ACETAMINOPHEN 650 MG/1
650 SUPPOSITORY RECTAL EVERY 6 HOURS PRN
Status: CANCELLED | OUTPATIENT
Start: 2024-09-27

## 2024-09-27 RX ORDER — SODIUM CHLORIDE 9 MG/ML
100 INJECTION, SOLUTION INTRAVENOUS CONTINUOUS
Status: DISCONTINUED | OUTPATIENT
Start: 2024-09-27 | End: 2024-09-27

## 2024-09-27 RX ORDER — LIDOCAINE HYDROCHLORIDE 10 MG/ML
INJECTION, SOLUTION EPIDURAL; INFILTRATION; INTRACAUDAL; PERINEURAL AS NEEDED
Status: DISCONTINUED | OUTPATIENT
Start: 2024-09-27 | End: 2024-09-27 | Stop reason: HOSPADM

## 2024-09-27 RX ORDER — SODIUM CHLORIDE 9 MG/ML
100 INJECTION, SOLUTION INTRAVENOUS CONTINUOUS
Status: DISCONTINUED | OUTPATIENT
Start: 2024-09-27 | End: 2024-09-27 | Stop reason: SDUPTHER

## 2024-09-27 RX ORDER — HEPARIN SODIUM 1000 [USP'U]/ML
INJECTION, SOLUTION INTRAVENOUS; SUBCUTANEOUS AS NEEDED
Status: DISCONTINUED | OUTPATIENT
Start: 2024-09-27 | End: 2024-09-27 | Stop reason: HOSPADM

## 2024-09-27 RX ORDER — ATORVASTATIN CALCIUM 10 MG/1
10 TABLET, FILM COATED ORAL NIGHTLY
Qty: 30 TABLET | Refills: 2 | Status: SHIPPED | OUTPATIENT
Start: 2024-09-27

## 2024-09-27 RX ORDER — ACETAMINOPHEN 325 MG/1
650 TABLET ORAL EVERY 6 HOURS PRN
Status: CANCELLED | OUTPATIENT
Start: 2024-09-27

## 2024-09-27 RX ORDER — ACETAMINOPHEN 160 MG/5ML
650 SOLUTION ORAL EVERY 6 HOURS PRN
Status: CANCELLED | OUTPATIENT
Start: 2024-09-27

## 2024-09-27 RX ORDER — SODIUM CHLORIDE 9 MG/ML
1.5 INJECTION, SOLUTION INTRAVENOUS CONTINUOUS
Status: DISCONTINUED | OUTPATIENT
Start: 2024-09-27 | End: 2024-09-27 | Stop reason: HOSPADM

## 2024-09-27 RX ORDER — FENTANYL CITRATE 50 UG/ML
INJECTION, SOLUTION INTRAMUSCULAR; INTRAVENOUS AS NEEDED
Status: DISCONTINUED | OUTPATIENT
Start: 2024-09-27 | End: 2024-09-27 | Stop reason: HOSPADM

## 2024-09-27 ASSESSMENT — PAIN - FUNCTIONAL ASSESSMENT
PAIN_FUNCTIONAL_ASSESSMENT: 0-10

## 2024-09-27 ASSESSMENT — PAIN SCALES - GENERAL
PAINLEVEL_OUTOF10: 0 - NO PAIN

## 2024-09-27 ASSESSMENT — ENCOUNTER SYMPTOMS
PSYCHIATRIC NEGATIVE: 1
MUSCULOSKELETAL NEGATIVE: 1
CARDIOVASCULAR NEGATIVE: 1
ALLERGIC/IMMUNOLOGIC NEGATIVE: 1
HEMATOLOGIC/LYMPHATIC NEGATIVE: 1
RESPIRATORY NEGATIVE: 1
GASTROINTESTINAL NEGATIVE: 1
CONSTITUTIONAL NEGATIVE: 1
ENDOCRINE NEGATIVE: 1
EYES NEGATIVE: 1
NEUROLOGICAL NEGATIVE: 1

## 2024-09-27 ASSESSMENT — COLUMBIA-SUICIDE SEVERITY RATING SCALE - C-SSRS
6. HAVE YOU EVER DONE ANYTHING, STARTED TO DO ANYTHING, OR PREPARED TO DO ANYTHING TO END YOUR LIFE?: NO
2. HAVE YOU ACTUALLY HAD ANY THOUGHTS OF KILLING YOURSELF?: NO
1. IN THE PAST MONTH, HAVE YOU WISHED YOU WERE DEAD OR WISHED YOU COULD GO TO SLEEP AND NOT WAKE UP?: NO

## 2024-09-27 NOTE — DISCHARGE INSTRUCTIONS
CARDIAC CATHETERIZATION DISCHARGE INSTRUCTIONS    Start Atorvastatin 10 mg at bedtime daily.     Follow up with Dr. Ramirez for surgery on 11/5/24. Follow up with Dr. Muniz's CNP, Carolynn Rodrigues CNP on 12/16/24.      FOR SUDDEN AND SEVERE CHEST PAIN, SHORTNESS OF BREATH, EXCESSIVE BLEEDING, SIGNS OF STROKE, OR CHANGES IN MENTAL STATUS YOU SHOULD CALL 911 IMMEDIATELY.     If your provider has prescribed aspirin and/or clopidogrel (Plavix), or prasugrel (Effient), or ticagrelor (Brilinta), DO NOT STOP THESE MEDICATIONS for any reason without talking to your cardiologist first. If any of these were prescribed, you must take them every day without missing a single dose. If you are getting low on these medications, contact your provider immediately for a refill.     FOR NEXT 24 HOURS  - Upon discharge, you should return home and rest for the remainder of the day and evening. You do not have to stay on bed rest but should not be very active.  It is recommended a responsible adult be with you for the first 24 hours after the procedure.    - No driving for 24 hours after procedure. Please arrange for someone to drive you home from the hospital today.     - Do not drive, operate machinery, or use power tools for 24 hours after your procedure.     - Do not make any legal decisions for 24 hours after your procedure.     - Do not drink alcoholic beverages for 24 hours after your procedure.    WOUND CARE   *FOR FEMORAL (LEG) ACCESS*  ·      Avoid heavy lifting (over 10 pounds) for 7 days, squatting or excessive bending for 2 days, and strenuous exercise for 7 days.  ·      No submerged bathing, swimming, or hot tubs for the next 7 days, or until fully healed.  ·      Avoid sexual activity for 3-4 days until any groin discomfort has ceased.     *FOR RADIAL (WRIST) ACCESS*  ·      No lifting more than 5 pounds or excessive use of the wrist for 24 hours - for example, treat your wrist as if it is sprained.  ·      Do not engage  in vigorous activities (tennis, golf, bowling, weights) for at least 48 hours after the procedure.  ·      Do not submerge the wrist for 7 days after the procedure.  ·      You should expect mild tingling in your hand and tenderness at the puncture site for up to 3 days.    - The transparent dressing should be removed from the site 24 hours after the procedure.  Wash the site gently with soap and water. Rinse well and pat dry. Keep the area clean and dry. You may apply a Band-Aid to the site. Avoid lotions, ointments, or powders until fully healed.     - You may shower the day after your procedure.      - It is normal to notice a small bruise around the puncture site and/or a small grape sized or smaller lump. Any large bruising or large lump warrants a call to the office.     - If bleeding should occur, lay down and apply pressure to the affected area for 10 minutes.  If the bleeding stops notify your physician.  If there is a large amount of bleeding or spurting of blood CALL 911 immediately.  DO NOT drive yourself to the hospital.    - You may experience some tenderness, bruising or minimal inflammation.  If you have any concerns, you may contact the Cath Lab or if any of these symptoms become excessive, contact your cardiologist or go to the emergency room.     OTHER INSTRUCTIONS  - You may take acetaminophen (Tylenol) as directed for discomfort.  If pain is not relieved with acetaminophen (Tylenol), contact your doctor.    - If you notice or experience any of the following, you should notify your doctor or seek medical attention  Chest pain or discomfort  Change in mental status or weakness in extremities.  Dizziness, light headedness, or feeling faint.  Change in the site where the procedure was performed, such as bleeding or an increased area of bruising or swelling.  Tingling, numbness, pain, or coolness in the leg/arm beyond the site where the procedure was performed.  Signs of infection (i.e. shaking  chills, temperature > 100 degrees Fahrenheit, warmth, redness) in the leg/arm area where the procedure was performed.  Changes in urination   Bloody or black stools  Vomiting blood  Severe nose bleeds  Any excessive bleeding    - If you DO NOT have an appointment with your cardiologist within 2-4 weeks following your procedure, please contact their office.

## 2024-09-27 NOTE — POST-PROCEDURE NOTE
Physician Transition of Care Summary  Invasive Cardiovascular Lab    Procedure Date: 9/27/2024  Attending:    * Raul Muniz - Primary  Resident/Fellow/Other Assistant: Surgeons and Role:  * No surgeons found with a matching role *    Indications:   Pre-op Diagnosis      * Aneurysm of ascending aorta without rupture (CMS-HCC) [I71.21]     * Abnormal findings on diagnostic imaging of other specified body structures [R93.89]    Post-procedure diagnosis:   Post-op Diagnosis     * Aneurysm of ascending aorta without rupture (CMS-HCC) [I71.21]     * Abnormal findings on diagnostic imaging of other specified body structures [R93.89]    Procedure(s):   Left Heart Cath with Coronary Angiography and LV  [address] - [place] CATH PLMT L HRT & ARTS W/NJX & ANGIO IMG S&I        Procedure Findings:   Minimal luminal irregularities.      Description of the Procedure:   Left radial    Complications:   none    Stents/Implants:   Implants       No implant documentation for this case.            Anticoagulation/Antiplatelet Plan:   na    Estimated Blood Loss:   5 mL    Anesthesia: Moderate Sedation Anesthesia Staff: No anesthesia staff entered.    Any Specimen(s) Removed:   No specimens collected during this procedure.    Disposition:   home      Electronically signed by: Raul Muniz MD, 9/27/2024 8:54 AM

## 2024-09-27 NOTE — H&P
History Of Present Illness  Kiley Ward is a 69 y.o. female presenting with ascending aortic aneurysm, here for [facility] as part of pre-op workup for aortic aneurysm surgery. PMHx significant for ascending aortic aneurysm with a trileaflet aortic valve, breast cancer, hyperlipidemia with a CT calcium score of 0 in 2024.     Past Medical History:  Past Medical History:   Diagnosis Date    Breast cancer (Multi)     Hx antineoplastic chemo     Personal history of irradiation     Personal history of malignant neoplasm of breast 2010    History of malignant neoplasm of female right breast    Personal history of malignant neoplasm of breast 2014    History of malignant neoplasm of left breast    Personal history of other venous thrombosis and embolism 02/09/2021    History of deep venous thrombosis        Past Surgical History:  Past Surgical History:   Procedure Laterality Date    BREAST LUMPECTOMY      OTHER SURGICAL HISTORY  02/09/2021    Lumpectomy          Social History:   reports that she has never smoked. She has never used smokeless tobacco. She reports current alcohol use of about 4.0 standard drinks of alcohol per week. She reports that she does not use drugs.     Family History:  Family History   Problem Relation Name Age of Onset    Breast cancer Mother      Prostate cancer Father      Breast cancer Sister      Glaucoma Neg Hx          Allergies:  No Known Allergies     Home Medications:  Current Outpatient Medications   Medication Instructions    aspirin 81 mg EC tablet oral    calcitriol (Rocaltrol) 0.25 mcg capsule 1 capsule, oral, Daily    carvedilol (COREG) 6.25 mg, oral, 2 times daily (morning and late afternoon)    dexlansoprazole (DEXILANT) 60 mg, oral, Daily    ergocalciferol (Vitamin D-2) 1.25 MG (22860 UT) capsule     exemestane (Aromasin) 25 mg tablet oral, Daily RT    famotidine (PEPCID) 40 mg, oral, Daily    mv-min/FA/vit K/lutein/zeaxant (PRESERVISION AREDS 2 PLUS MV ORAL) 1 capsule, oral, Daily  "      Inpatient Medications:  Scheduled medications   Medication Dose Route Frequency     PRN medications   Medication     Continuous Medications   Medication Dose Last Rate    sodium chloride 0.9%  100 mL/hr           Review of Systems   Constitutional: Negative.    HENT: Negative.     Eyes: Negative.    Respiratory: Negative.     Cardiovascular: Negative.    Gastrointestinal: Negative.    Endocrine: Negative.    Genitourinary: Negative.    Musculoskeletal: Negative.    Skin: Negative.    Allergic/Immunologic: Negative.    Neurological: Negative.    Hematological: Negative.    Psychiatric/Behavioral: Negative.            Physical Exam  General:  Patient is awake, alert, and oriented.  Patient is in no acute distress.  HEENT:  Pupils equal and reactive.  Normocephalic.  Moist mucosa.    Neck:  No JVD.   Cardiovascular:  Regular rate and rhythm.  Normal S1 and S2. No murmurs/rubs/gallops. Radial pulses 2+.   Pulmonary:  Clear to auscultation bilaterally.  Abdomen:  Soft. Non-tender.   Non-distended.  Positive bowel sounds.  Lower Extremities:  Pedal pulses 2+ No LE edema.  Neurologic:  Cranial nerves II-XII grossly intact.   No focal deficit.   Skin: Skin warm and dry, no lesions. Normal skin turgor.   Psychiatric: Normal affect.     Sedation Plan    ASA 3     Mallampati class: II.    Risks, benefits, and alternatives discussed with patient.         NPO since 000    Last Recorded Vitals  Blood pressure 166/86, temperature 36.8 °C (98.2 °F), temperature source Temporal, resp. rate 16, height 1.626 m (5' 4\"), weight 68 kg (150 lb), SpO2 96%.         Vitals from the Past 24 Hours  Temp:  [36.8 °C (98.2 °F)]   Resp:  [16]   BP: (166)/(86)   Height:  [162.6 cm (5' 4\")]   Weight:  [68 kg (150 lb)]   SpO2:  [96 %]          Relevant Results    Labs    CBC:   Recent Labs     09/25/24  0925 10/18/22  0903 03/18/21  0856   WBC 5.4 4.8 5.0   HGB 12.9 13.2 12.9   HCT 40.0 40.2 40.1    301 279   MCV 93 95 97     BMP/CMP: " "  Recent Labs     09/25/24  0925 05/23/24  1035 02/23/24  0759 10/18/22  0903 03/18/21  0856    139 138 139 142   K 4.1 3.7 3.9 3.7 4.1    104 104 105 106   BUN 17 17 20 18 15   CREATININE 0.83 0.80 0.95 0.85 0.84   CO2 27 28 28 27 28   CALCIUM 9.2 9.3 9.6 9.4 9.7   PROT  --  7.4 7.6 7.6 7.4   BILITOT  --  0.6 0.6 0.7 0.8   ALKPHOS  --  70 66 66 71   ALT  --  19 18 17 25   AST  --  22 22 21 23   GLUCOSE 94 97 83 89 98      Lipid Panel:   Recent Labs     05/23/24  1035 02/23/24  0759 10/18/22  0903 03/18/21  0856   CHOL 177 200* 186 199   HDL 59.4 65.2 61.2 62.8   CHHDL 3.0 3.1 3.0 3.2   VLDL 15 14 13 14   TRIG 74 72 64 69   NHDL 118 135  --   --      Cardiac       No lab exists for component: \"CK\", \"CKMBP\"   Hemoglobin A1C:   Recent Labs     10/18/22  0903   HGBA1C 5.6     TSH/ Free T4:   Recent Labs     09/25/24  0925   TSH 0.70     Iron: No results for input(s): \"FERRITIN\", \"TIBC\", \"IRONSAT\", \"BNP\" in the last 24203 hours.  Coag:     ABO: No results found for: \"ABO\"    Past Cardiology Tests (Last 3 Years):    EKG:  No results for input(s): \"ATRRATE\", \"VENTRATE\", \"PRINT\", \"QRSDUR\", \"QTCFRED\", \"QTCCALCB\" in the last 41384 hours.No results found for this or any previous visit (from the past 4464 hour(s)).  Echo:  Echocardiogram:   Transthoracic Echo (TTE) Complete 05/31/2024    [facility], [address] [place] [address], [place] [address]  Tel [phone] and Fax [phone]    TRANSTHORACIC ECHOCARDIOGRAM REPORT      Patient Name:      DUTCH FITZGERALD       Mira Physician:    [id number] Fortino Marcelino MD  Study Date:        5/31/2024            Ordering Provider:    [id number] RAHEL BENJAMIN  MRN/PID:           [id number]             Fellow:  Accession#:        [id number]         Nurse:  Date of Birth/Age: 1955 / 69 years Sonographer:          Paris AYALA  Gender:            F                    Additional Staff:  Height:            162.56 cm            Admit Date:  Weight:       "      68.04 kg             Admission Status:     Outpatient  BSA / BMI:         1.73 m2 / 25.75      Encounter#:           [id number]  kg/m2  Department Location:  [facility]  Blood Pressure: 167 /95 mmHg    Study Type:    TRANSTHORACIC ECHO (TTE) COMPLETE  Diagnosis/ICD: Ascending aorta dilatation-I77.810; Nonrheumatic aortic (valve)  insufficiency-I35.1  Indication:    Ascending Ao dilation, AI  CPT Code:      Echo Complete w Full Doppler-[id number]    Patient History:  Pertinent History: Ascending Ao dilation, AI, GERD, Hyperthyroidism,  Diverticular disease, short- segment Crocker's esophagus, H/o  Breast CA, h/o CHEMO and Radiation (2011, 2015), murmur.    Study Detail: The following Echo studies were performed: 2D, M-Mode, Doppler and  color flow.      PHYSICIAN INTERPRETATION:  Left Ventricle: The left ventricular systolic function is low normal, with an estimated ejection fraction of 50-55%. There are no regional wall motion abnormalities. The left ventricular cavity size is normal. Spectral Doppler shows an impaired relaxation pattern of left ventricular diastolic filling.  Left Atrium: The left atrium is mild to moderately dilated.  Right Ventricle: The right ventricle is normal in size. There is normal right ventricular global systolic function.  Right Atrium: The right atrium is normal in size.  Aortic Valve: The aortic valve appears structurally normal. There is evidence of mild aortic valve stenosis.  There is mild aortic valve regurgitation. The peak instantaneous gradient of the aortic valve is 22.0 mmHg. The mean gradient of the aortic valve is 10.1 mmHg.  Mitral Valve: The mitral valve is normal in structure. There is trace to mild mitral valve regurgitation.  Tricuspid Valve: The tricuspid valve is structurally normal. There is mild tricuspid regurgitation. The Doppler estimated RVSP is mildly elevated at 33.6 mmHg.  Pulmonic Valve: The pulmonic valve is structurally normal. There is trace  pulmonic valve regurgitation.  Pericardium: There is a trivial pericardial effusion.  Aorta: The aortic root is abnormal. There is moderate dilatation of the ascending aorta. There is mild dilatation of the aortic root.  Systemic Veins: The inferior vena cava appears to be of normal size. There is IVC inspiratory collapse greater than 50%.  In comparison to the previous echocardiogram(s): There are no prior studies on this patient for comparison purposes.      CONCLUSIONS:  1. Left ventricular systolic function is low normal with a 50-55% estimated ejection fraction.  2. Spectral Doppler shows an impaired relaxation pattern of left ventricular diastolic filling.  3. The left atrium is mild to moderately dilated.  4. Mildly elevated RVSP.  5. Mild aortic valve stenosis.  6. Mild aortic valve regurgitation.  7. There is moderate dilatation of the ascending aorta.    QUANTITATIVE DATA SUMMARY:  2D MEASUREMENTS:  Normal Ranges:  Ao Root d:     3.80 cm   (2.0-3.7cm)  LAs:           3.79 cm   (2.7-4.0cm)  IVSd:          0.90 cm   (0.6-1.1cm)  LVPWd:         0.77 cm   (0.6-1.1cm)  LVIDd:         4.24 cm   (3.9-5.9cm)  LVIDs:         3.51 cm  LV Mass Index: 63.1 g/m2  LV % FS        17.3 %    LA VOLUME:  Normal Ranges:  LA Vol A4C:        81.6 ml    (22+/-6mL/m2)  LA Vol A2C:        133.9 ml  LA Vol BP:         108.8 ml  LA Vol Index A4C:  47.1 ml/m2  LA Vol Index A2C:  77.4 ml/m2  LA Vol Index BP:   62.9 ml/m2  LA Area A4C:       24.0 cm2  LA Area A2C:       32.0 cm2  LA Major Axis A4C: 6.0 cm  LA Major Axis A2C: 6.5 cm  LA Volume Index:   62.0 ml/m2  LA Vol A4C:        78.7 ml  LA Vol A2C:        123.3 ml    RA VOLUME BY A/L METHOD:  Normal Ranges:  RA Vol A4C:        60.7 ml    (8.3-19.5ml)  RA Vol Index A4C:  35.1 ml/m2  RA Area A4C:       20.0 cm2  RA Major Axis A4C: 5.6 cm    M-MODE MEASUREMENTS:  Normal Ranges:  Ao Root: 4.20 cm (2.0-3.7cm)  LAs:     3.32 cm (2.7-4.0cm)    AORTA MEASUREMENTS:  Normal Ranges:  Asc Ao,  d: 4.50 cm (2.1-3.4cm)  Ao Arch:   3.40 cm (2.0-3.6cm)    LV SYSTOLIC FUNCTION BY 2D PLANIMETRY (MOD):  Normal Ranges:  EF-A4C View: 59.1 % (>=55%)  EF-A2C View: 54.3 %  EF-Biplane:  56.2 %    LV DIASTOLIC FUNCTION:  Normal Ranges:  MV Peak E:        0.52 m/s    (0.7-1.2 m/s)  MV Peak A:        1.09 m/s    (0.42-0.7 m/s)  E/A Ratio:        0.47        (1.0-2.2)  MV e'             0.06 m/s    (>8.0)  MV lateral e'     0.11 m/s  MV medial e'      0.09 m/s  MV A Dur:         125.59 msec  E/e' Ratio:       7.98        (<8.0)  a'                0.14 m/s  PulmV Sys Sathish:    55.13 cm/s  PulmV Carmona Sathish:   35.20 cm/s  PulmV S/D Sathish:    1.57  PulmV A Revs Sathish: 26.43 cm/s  PulmV A Revs Dur: 104.66 msec    MITRAL VALVE:  Normal Ranges:  MV DT: 148 msec (150-240msec)    MITRAL INSUFFICIENCY:  Normal Ranges:  MR VTI:  252.95 cm  MR Vmax: 641.27 cm/s    AORTIC VALVE:  Normal Ranges:  AoV Vmax:                2.34 m/s  (<=1.7m/s)  AoV Peak P.0 mmHg (<20mmHg)  AoV Mean PG:             10.1 mmHg (1.7-11.5mmHg)  LVOT Max Sathish:            1.53 m/s  (<=1.1m/s)  AoV VTI:                 50.03 cm  (18-25cm)  LVOT VTI:                31.38 cm  LVOT Diameter:           2.05 cm   (1.8-2.4cm)  AoV Area, VTI:           2.08 cm2  (2.5-5.5cm2)  AoV Area,Vmax:           2.16 cm2  (2.5-4.5cm2)  AoV Dimensionless Index: 0.63    AORTIC INSUFFICIENCY:  AI Vmax:       5.21 m/s  AI Half-time:  383 msec  AI Decel Time: 1322 msec  AI Decel Rate: 396.39 cm/s2      RIGHT VENTRICLE:  RV Basal 3.90 cm  RV Mid   2.50 cm  RV Major 8.5 cm  TAPSE:   23.7 mm  RV s'    0.18 m/s    TRICUSPID VALVE/RVSP:  Normal Ranges:  Peak TR Velocity: 2.77 m/s  RV Syst Pressure: 33.6 mmHg (< 30mmHg)  IVC Diam:         1.30 cm    PULMONIC VALVE:  Normal Ranges:  PV Accel Time: 140 msec (>120ms)  PV Max Sathish:    1.0 m/s  (0.6-0.9m/s)  PV Max P.0 mmHg    Pulmonary Veins:  PulmV A Revs Dur: 104.66 msec  PulmV A Revs Sathish: 26.43 cm/s  PulmV Carmona Sathish:   35.20  "cm/s  PulmV S/D Sathish:    1.57  PulmV Sys Sathish:    55.13 cm/s      [id number] Fortino Marcelino MD  Electronically signed on 5/31/2024 at 10:25:09 PM        ** Final **    Ejection Fractions:  No results found for: \"EF\"  Cath:  Coronary Angiography: No results found for this or any previous visit from the past 1800 days.    Right Heart Cath: No results found for this or any previous visit from the past 1800 days.    Stress Test:  Nuclear:No results found for this or any previous visit from the past 1800 days.    Metabolic Stress: No results found for this or any previous visit from the past 1800 days.    Cardiac Imaging:  Cardiac Scoring:   CT cardiac scoring wo IV contrast 05/13/2024    Narrative  Interpreted By:  Suzette Olivares,  STUDY:  CT CARDIAC SCORING WO IV CONTRAST;  5/13/2024 8:06 am    INDICATION:  Signs/Symptoms:Hyperlipidemia; Screening.    COMPARISON:  None.    ACCESSION NUMBER(S):  [id number]    ORDERING CLINICIAN:  ANTE PLETIKOSIC    TECHNIQUE:  Using prospective ECG gating, CT scan of the coronary arteries was  performed without intravenous contrast. Coronary calcium scoring  was  performed according to the method of Agatston.    FINDINGS:  The score and distribution of calcium in the coronary arteries is as  follows:    LM 0,  LAD 0,  LCx 0,  RCA 0,    Total 0    The visualized mid/lower ascending thoracic aorta is dilated and  measures 5.2 cm in diameter. Descending thoracic aorta 3 cm. The  heart is normal in size. No pericardial effusion is present.    No gross evidence of mediastinal or hilar lymphadenopathy or masses  is identified. The visualized segments of the lungs are normally  expanded.    The visualized subdiaphragmatic structures appear intact.    Impression  1. Coronary artery calcium score of 0*.  2. There is aneurysmal dilatation of the ascending aorta (5.2 cm).  Recommend dedicated thoracic CTA for definitive assessment. Critical  Finding:  See findings. Notification was initiated on 5/13/2024 " "at  9:20 am by  Suzette Olivares.  (**-OCF-**) Instructions:    *Coronary artery calcium scoring may be helpful in predicting the  risk for future coronary heart disease events.  According to the  American College of Cardiology Foundation Clinical Expert Consensus  Task Force, such testing provides important prognostic information in  patients with more than one coronary heart disease risk factor. The  coronary artery calcium score correlates with the annual risk of a  non-fatal myocardial infarction or coronary heart disease death.    Coronary artery score            Annual Risk    0-99                             0.4%  100-399                        1.3%  >400                            2.4%    These three \"breakpoints\" correspond to lower, intermediate and high  risk states for future coronary events.  Such information should be  used, along with appropriate clinical judgment, to make decisions  regarding the intensity of risk factor management strategies to treat  blood lipids and to modify other non-lipid coronary risk factors.    Reference: Hoonah P et al. Circulation.  2007; 115:402-426    MACRO:  Critical Finding:  See findings. Notification was initiated on  5/13/2024 at 9:20 am by  Suzette Olivares.  (**-OCF-**) Instructions:    Signed by: Suzette Olivares 5/13/2024 9:21 AM  Dictation workstation:   [id number]    Cardiac MRI: No results found for this or any previous visit from the past 1800 days.         Assessment/Plan  Assessment/Plan   Principal Problem:    Abnormal findings on diagnostic imaging of other specified body structures  Active Problems:    Aneurysm of ascending aorta without rupture (CMS-[facility])        #Ascending Aortic Aneurysm  -[facility] with Dr. Muniz as part of pre-op workup for aortic aneurysm surgery planned for 11/5/24 with Dr. Magana.        NP discussed with Dr. Muniz regarding plan of care/ discharge plan      I spent 30 minutes in the professional and overall care of this patient.      Leta WEI" YOSVANY Ritter-CNP

## 2024-09-27 NOTE — Clinical Note
Closure device placed in the left radial artery. Site closed by radial compression system. 12cc 1+ distal pulse

## 2024-10-02 ENCOUNTER — APPOINTMENT (OUTPATIENT)
Dept: CARDIOLOGY | Facility: HOSPITAL | Age: 69
End: 2024-10-02
Payer: MEDICARE

## 2024-10-10 ENCOUNTER — CLINICAL SUPPORT (OUTPATIENT)
Dept: PREADMISSION TESTING | Facility: HOSPITAL | Age: 69
End: 2024-10-10
Payer: MEDICARE

## 2024-10-10 ENCOUNTER — APPOINTMENT (OUTPATIENT)
Dept: PRIMARY CARE | Facility: CLINIC | Age: 69
End: 2024-10-10
Payer: MEDICARE

## 2024-10-10 VITALS
WEIGHT: 153 LBS | DIASTOLIC BLOOD PRESSURE: 76 MMHG | OXYGEN SATURATION: 98 % | BODY MASS INDEX: 26.26 KG/M2 | HEART RATE: 62 BPM | SYSTOLIC BLOOD PRESSURE: 120 MMHG

## 2024-10-10 DIAGNOSIS — K21.9 GASTROESOPHAGEAL REFLUX DISEASE, UNSPECIFIED WHETHER ESOPHAGITIS PRESENT: Primary | ICD-10-CM

## 2024-10-10 DIAGNOSIS — E55.9 VITAMIN D DEFICIENCY: ICD-10-CM

## 2024-10-10 DIAGNOSIS — I71.21 ANEURYSM OF ASCENDING AORTA WITHOUT RUPTURE (CMS-HCC): ICD-10-CM

## 2024-10-10 DIAGNOSIS — E78.5 HYPERLIPIDEMIA, UNSPECIFIED HYPERLIPIDEMIA TYPE: ICD-10-CM

## 2024-10-10 DIAGNOSIS — Z85.3 HISTORY OF BREAST CANCER IN FEMALE: ICD-10-CM

## 2024-10-10 PROCEDURE — 1159F MED LIST DOCD IN RCRD: CPT | Performed by: STUDENT IN AN ORGANIZED HEALTH CARE EDUCATION/TRAINING PROGRAM

## 2024-10-10 PROCEDURE — 1036F TOBACCO NON-USER: CPT | Performed by: STUDENT IN AN ORGANIZED HEALTH CARE EDUCATION/TRAINING PROGRAM

## 2024-10-10 PROCEDURE — 99214 OFFICE O/P EST MOD 30 MIN: CPT | Performed by: STUDENT IN AN ORGANIZED HEALTH CARE EDUCATION/TRAINING PROGRAM

## 2024-10-10 PROCEDURE — G2211 COMPLEX E/M VISIT ADD ON: HCPCS | Performed by: STUDENT IN AN ORGANIZED HEALTH CARE EDUCATION/TRAINING PROGRAM

## 2024-10-10 RX ORDER — DEXLANSOPRAZOLE 60 MG/1
1 CAPSULE, DELAYED RELEASE ORAL DAILY
Qty: 90 CAPSULE | Refills: 1 | Status: SHIPPED | OUTPATIENT
Start: 2024-10-10

## 2024-10-10 RX ORDER — ATORVASTATIN CALCIUM 10 MG/1
10 TABLET, FILM COATED ORAL NIGHTLY
Qty: 90 TABLET | Refills: 1 | Status: SHIPPED | OUTPATIENT
Start: 2024-10-10

## 2024-10-10 ASSESSMENT — PATIENT HEALTH QUESTIONNAIRE - PHQ9
2. FEELING DOWN, DEPRESSED OR HOPELESS: NOT AT ALL
SUM OF ALL RESPONSES TO PHQ9 QUESTIONS 1 AND 2: 0
1. LITTLE INTEREST OR PLEASURE IN DOING THINGS: NOT AT ALL

## 2024-10-10 NOTE — CPM/PAT NURSE NOTE
CPM/PAT Nurse Note      Name: Kiley Ward)  /Age: [birth year]//69 y.o.       Past Medical History:   Diagnosis Date    ARMD (age related macular degeneration)     Ascending aorta dilation (CMS-HCC)     Crocker's esophagus     Breast cancer (Multi)     s/p lumpectomy and sentinel lymph node biopsy chemotherapy and radiation.    DVT (deep venous thrombosis) (Multi)     prior left upper extremity DVT treated with Eliquis for 6 months.    GERD (gastroesophageal reflux disease)     Hx antineoplastic chemo     Hyperlipidemia     Hypertension     Hyperthyroidism     Murmur     Personal history of irradiation     PVD (posterior vitreous detachment), both eyes     Vitamin D deficiency        Past Surgical History:   Procedure Laterality Date    BREAST LUMPECTOMY      CARDIAC CATHETERIZATION N/A 2024    Procedure: Left Heart Cath with Coronary Angiography and LV;  Surgeon: Raul Muniz MD;  Location: [facility];  Service: Cardiovascular;  Laterality: N/A;    ESOPHAGOGASTRODUODENOSCOPY  2023    OTHER SURGICAL HISTORY  2021    Lumpectomy       Patient Sexual activity questions deferred to the physician.    Family History   Problem Relation Name Age of Onset    Breast cancer Mother      Prostate cancer Father      Breast cancer Sister      Glaucoma Neg Hx         No Known Allergies    Prior to Admission medications    Medication Sig Start Date End Date Taking? Authorizing Provider   aspirin 81 mg EC tablet Take by mouth.    Historical Provider, MD   atorvastatin (Lipitor) 10 mg tablet Take 1 tablet (10 mg) by mouth once daily at bedtime. 10/10/24   Ante T Pletikosic, DO   calcitriol (Rocaltrol) 0.25 mcg capsule Take 1 capsule (0.25 mcg) by mouth once daily. 17   Historical Provider, MD   carvedilol (Coreg) 6.25 mg tablet Take 1 tablet (6.25 mg) by mouth 2 times daily (morning and late afternoon). 24  Raul Muniz MD   dexlansoprazole (Dexilant) 60 mg   capsule Take 1 capsule (60 mg) by mouth once daily. 10/10/24   Ante T DO Dayday   ergocalciferol (Vitamin D-2) 1.25 MG (18384 UT) capsule  9/19/23   Historical Provider, MD   exemestane (Aromasin) 25 mg tablet Take by mouth once daily. 4/4/17   Historical Provider, MD   famotidine (Pepcid) 40 mg tablet Take 1 tablet (40 mg) by mouth once daily.    Historical Provider, MD   mv-min/FA/vit K/lutein/zeaxant (PRESERVISION AREDS 2 PLUS MV ORAL) Take 1 capsule by mouth once daily.    Historical Provider, MD   atorvastatin (Lipitor) 10 mg tablet Take 1 tablet (10 mg) by mouth once daily at bedtime. 9/27/24 10/10/24  Leta Ritter, APRN-CNP   dexlansoprazole (Dexilant) 60 mg DR capsule TAKE 1 CAPSULE BY MOUTH ONCE DAILY. 9/10/24 10/10/24  Ante T DO Dayday        PAT ROS     DASI Risk Score    No data to display       Caprini DVT Assessment      Flowsheet Row Admission (Discharged) from 9/27/2024 in [facility] with Raul Muniz MD   DVT Score 2 filed at 09/27/2024 0817   Medical Factors Medical patient at bedrest filed at 09/27/2024 0817   Surgical Factors Minor surgery planned filed at 09/27/2024 0817          Modified Frailty Index    No data to display       CHADS2 Stroke Risk  Current as of about an hour ago        N/A 3 to 100%: High Risk   2 to < 3%: Medium Risk   0 to < 2%: Low Risk     Last Change: N/A          This score determines the patient's risk of having a stroke if the patient has atrial fibrillation.        This score is not applicable to this patient. Components are not calculated.          Revised Cardiac Risk Index    No data to display       Apfel Simplified Score    No data to display       Risk Analysis Index Results This Encounter    No data found in the last 10 encounters.     Scheduled for Median Sternotomy, Replace/Repair Aortic Valve and Replace Aortic Root, Ascending Aorta and hemiarch, hypothermic circulatory arrest, retrograde cerebral perfusion, axillary artery  cannulation, +/- coronary surgery with Dr. Ramirez on 11/5/24.  PCP: Ester Trivedi DO [facility] 10/10/24  Cardiology: Raul Muniz MD [facility] 9/25/24  General Surgery: Nicky Mayers, APRN-CNP [facility] 8/13/24 seen for right breast mastitis   Cardiac Surgery: Efrain Ramirez MD [facility] 7/18/24 seen to discuss treatment recommendations for dilated ascending aorta.   Endocrine:  Shahana Ceballos MD P: [phone] F: [phone]  -Cardiac Cath; 9/27/24  CONCLUSIONS:   1. Minimal luminal irregularities in a right dominant system.    -Transthoracic Echo; 5/31/24  CONCLUSIONS:   1. Left ventricular systolic function is low normal with a 50-55% estimated ejection fraction.   2. Spectral Doppler shows an impaired relaxation pattern of left ventricular diastolic filling.   3. The left atrium is mild to moderately dilated.   4. Mildly elevated RVSP.   5. Mild aortic valve stenosis.   6. Mild aortic valve regurgitation.   7. There is moderate dilatation of the ascending aorta.    - CT ANGIO CHEST W AND WO IV CONTRAST; 5/29/2024   IMPRESSION:  1. Ascending aortic fusiform aneurysm measuring 5.2 cm in its maximum  dimension at the level of the main pulmonary artery and returns to  normal at the level of the mid arch. Recommend follow-up radiation  sparing cardiac MRI and thoracic MRA to assess status of the aortic  valve and size and extent of the aneurysm in approximately 12 months.  2. The sinotubular junction is effaced.  3. The aortic root is dilated.  4. The central pulmonary arteries appear dilated correlate with  elevated right-sided filling pressures.    - CT CARDIAC SCORING WO IV CONTRAST; 5/13/2024   IMPRESSION:  1. Coronary artery calcium score of 0*.  2. There is aneurysmal dilatation of the ascending aorta (5.2 cm).  Recommend dedicated thoracic CTA for definitive assessment. Critical  Finding:  See findings. Notification was initiated on 5/13/2024 at  9:20 am by  Suzette Olivares.  (**-OCF-**) Instructions:    -CT ANGIO CHEST W AND WO  IV CONTRAST; 5/29/2024   IMPRESSION:  1. Ascending aortic fusiform aneurysm measuring 5.2 cm in its maximum  dimension at the level of the main pulmonary artery and returns to  normal at the level of the mid arch. Recommend follow-up radiation  sparing cardiac MRI and thoracic MRA to assess status of the aortic  valve and size and extent of the aneurysm in approximately 12 months.  2. The sinotubular junction is effaced.  3. The aortic root is dilated.  4. The central pulmonary arteries appear dilated correlate with  elevated right-sided filling pressures.    Nurse Plan of Action:   Requested last endocrine office note on 10/10/24.   ONLY    Opal Mendes RN  Pre-Admission Testing

## 2024-10-10 NOTE — PROGRESS NOTES
Subjective   Reason for Visit: Kiley Ward is an 69 y.o. female here for a follow-up visit.     Recently established care here after moving from [place]. Sees a lot of physicians at [facility], including oncology, endocrinology, gastroenterology.     1. Health Maintenance updates  Overall patient is doing well.   Immunization:   -Tdap due, willing to update today  -Influenza vaccine UTD   -Shingrix UTD    -PCV UTD  -COVID-19 vaccine UTD  Colon Cancer Screening: Last colonoscopy was in 2019, with 5-year clearance.  She did establish with GI who will obtain records from [facility] and schedule her colonoscopy.  OB/GYN:   - Pap smear due   - mammogram up to date.  History of breast cancer x 2  DEXA: Believes it was done in [facility] in 2023, due 2025, does have osteoporosis  Diet: Well-balanced  Exercise: Regularly  Tobacco: Denies use  EtOH: Denies use    2. Hyperlipidemia  Last lipid panel with LDL improved from 120 to 103  Currently on atorvastatin 10 mg once daily  CT calcium score of 0  The 10-year ASCVD risk score (Deangelo VICTORIA, et al., 2019) is: 5.6%    Values used to calculate the score:      Age: 69 years      Sex: Female      Is Non- : No      Diabetic: No      Tobacco smoker: No      Systolic Blood Pressure: 120 mmHg      Is BP treated: No      HDL Cholesterol: 59.4 mg/dL      Total Cholesterol: 177 mg/dL    3. Vitamin D def/osteoporosis  Currently on Vit D and Calcium supplementation.   Currently sees and endocrinologist who takes care of this and her DEXA scan which she states her most recent was 2023  Our records show DEXA 2021 - osteoporosis    4. History of bilateral breast cancer  Established with surgical oncology at [facility].    5. Gerd/Hx of barretts eso  Endoscopy in 2023 showed improvement per patient.,  Next due in 2026  Taking Dexlansoprazole for GERD and sxs are well controlled.     6.  Ascending aortic aneurysm/trileaflet aortic valve  Patient to have a surgery for aortic  ascending aneurysm on 11/5/2024 with Dr. Ramirez  Found on routine CT calcium score screening  Currently asymptomatic  Exercises 3 times a week without chest pain or shortness of breath    7. Need for Tdap vaccination  Agreeable for vaccination to be done at the local pharmacy        No Known Allergies    Immunization History   Administered Date(s) Administered    Flu vaccine (IIV4), preservative free *Check age/dose* 11/09/2017    Flu vaccine, trivalent, preservative free, HIGH-DOSE, age 65y+ (Fluzone) 10/18/2022, 09/16/2024    Flu vaccine, trivalent, preservative free, age 6 months and greater (Fluarix/Fluzone/Flulaval) 11/08/2018    Influenza, Seasonal, Quadrivalent, Adjuvanted 01/05/2024    Influenza, Unspecified 10/26/2012, 10/16/2013, 10/30/2014, 11/16/2015, 11/09/2017, 11/08/2018, 10/20/2020, 10/18/2022, 01/05/2024    Influenza, seasonal, injectable 10/26/2012, 10/16/2013, 10/30/2014, 11/16/2015    Pfizer COVID-19 vaccine, 12 years and older, (30mcg/0.3mL) (Comirnaty) 09/16/2024    Pfizer Purple Cap SARS-CoV-2 03/23/2021, 04/13/2021, 01/20/2022    Pneumococcal conjugate vaccine, 20-valent (PREVNAR 20) 02/28/2024    Tdap vaccine, age 7 year and older (BOOSTRIX, ADACEL) 09/04/2014    Zoster vaccine, recombinant, adult (SHINGRIX) 09/26/2019, 01/16/2020    Zoster, live 03/25/2014       Review of Systems  All pertinent positive symptoms are included in the history of present illness.    All other systems have been reviewed and are negative and noncontributory to this patient's current ailments.    Objective   Vitals:  /76 (BP Location: Left arm, Patient Position: Sitting, BP Cuff Size: Adult)   Pulse 62   Wt 69.4 kg (153 lb)   SpO2 98%   BMI 26.26 kg/m²     Lab on 09/25/2024   Component Date Value Ref Range Status    WBC 09/25/2024 5.4  4.4 - 11.3 x10*3/uL Final    nRBC 09/25/2024 0.0  0.0 - 0.0 /100 WBCs Final    RBC 09/25/2024 4.31  4.00 - 5.20 x10*6/uL Final    Hemoglobin 09/25/2024 12.9  12.0 - 16.0  g/dL Final    Hematocrit 09/25/2024 40.0  36.0 - 46.0 % Final    MCV 09/25/2024 93  80 - 100 fL Final    MCH 09/25/2024 29.9  26.0 - 34.0 pg Final    MCHC 09/25/2024 32.3  32.0 - 36.0 g/dL Final    RDW 09/25/2024 13.2  11.5 - 14.5 % Final    Platelets 09/25/2024 314  150 - 450 x10*3/uL Final    Glucose 09/25/2024 94  74 - 99 mg/dL Final    Sodium 09/25/2024 140  136 - 145 mmol/L Final    Potassium 09/25/2024 4.1  3.5 - 5.3 mmol/L Final    Chloride 09/25/2024 104  98 - 107 mmol/L Final    Bicarbonate 09/25/2024 27  21 - 32 mmol/L Final    Anion Gap 09/25/2024 13  10 - 20 mmol/L Final    Urea Nitrogen 09/25/2024 17  6 - 23 mg/dL Final    Creatinine 09/25/2024 0.83  0.50 - 1.05 mg/dL Final    eGFR 09/25/2024 77  >60 mL/min/1.73m*2 Final    Calculations of estimated GFR are performed using the 2021 CKD-EPI Study Refit equation without the race variable for the IDMS-Traceable creatinine methods.  https://jasn.asnjournals.org/content/early/2021/09/22/ASN.8634584987    Calcium 09/25/2024 9.2  8.6 - 10.3 mg/dL Final    Thyroid Stimulating Hormone 09/25/2024 0.70  0.44 - 3.98 mIU/L Final   Hospital Outpatient Visit on 05/31/2024   Component Date Value Ref Range Status    AV pk julio 05/31/2024 2.34  m/s Final    AV mn grad 05/31/2024 10.1  mmHg Final    LVOT diam 05/31/2024 2.05  cm Final    LV Biplane EF 05/31/2024 56  % Final    MV avg E/e' ratio 05/31/2024 7.98   Final    MV E/A ratio 05/31/2024 0.47   Final    LA vol index A/L 05/31/2024 62.9  ml/m2 Final    Tricuspid annular plane systolic e* 05/31/2024 2.4  cm Final    RV free wall pk S' 05/31/2024 18.00  cm/s Final    LVIDd 05/31/2024 4.24  cm Final    RVSP 05/31/2024 33.6  mmHg Final    Aortic Valve Area by Continuity of* 05/31/2024 2.08  cm2 Final    Aortic Valve Area by Continuity of* 05/31/2024 2.16  cm2 Final    AV pk grad 05/31/2024 22.0  mmHg Final    LV A4C EF 05/31/2024 59.1   Final   Lab on 05/23/2024   Component Date Value Ref Range Status    Cholesterol  05/23/2024 177  0 - 199 mg/dL Final          Age      Desirable   Borderline High   High     0-19 Y     0 - 169       170 - 199     >/= 200    20-24 Y     0 - 189       190 - 224     >/= 225         >24 Y     0 - 199       200 - 239     >/= 240   **All ranges are based on fasting samples. Specific   therapeutic targets will vary based on patient-specific   cardiac risk.    Pediatric guidelines reference:Pediatrics 2011, 128(S5).Adult guidelines reference: NCEP ATPIII Guidelines,EMMETT 2001, 258:2486-97    Venipuncture immediately after or during the administration of Metamizole may lead to falsely low results. Testing should be performed immediately prior to Metamizole dosing.    HDL-Cholesterol 05/23/2024 59.4  mg/dL Final      Age       Very Low   Low     Normal    High    0-19 Y    < 35      < 40     40-45     ----  20-24 Y    ----     < 40      >45      ----        >24 Y      ----     < 40     40-60      >60      Cholesterol/HDL Ratio 05/23/2024 3.0   Final      Ref Values  Desirable  < 3.4  High Risk  > 5.0    LDL Calculated 05/23/2024 103 (H)  <=99 mg/dL Final                                Near   Borderline      AGE      Desirable  Optimal    High     High     Very High     0-19 Y     0 - 109     ---    110-129   >/= 130     ----    20-24 Y     0 - 119     ---    120-159   >/= 160     ----      >24 Y     0 -  99   100-129  130-159   160-189     >/=190      VLDL 05/23/2024 15  0 - 40 mg/dL Final    Triglycerides 05/23/2024 74  0 - 149 mg/dL Final       Age         Desirable   Borderline High   High     Very High   0 D-90 D    19 - 174         ----         ----        ----  91 D- 9 Y     0 -  74        75 -  99     >/= 100      ----    10-19 Y     0 -  89        90 - 129     >/= 130      ----    20-24 Y     0 - 114       115 - 149     >/= 150      ----         >24 Y     0 - 149       150 - 199    200- 499    >/= 500    Venipuncture immediately after or during the administration of Metamizole may lead to falsely  low results. Testing should be performed immediately prior to Metamizole dosing.    Non HDL Cholesterol 05/23/2024 118  0 - 149 mg/dL Final          Age       Desirable   Borderline High   High     Very High     0-19 Y     0 - 119       120 - 144     >/= 145    >/= 160    20-24 Y     0 - 149       150 - 189     >/= 190      ----         >24 Y    30 mg/dL above LDL Cholesterol goal      Glucose 05/23/2024 97  74 - 99 mg/dL Final    Sodium 05/23/2024 139  136 - 145 mmol/L Final    Potassium 05/23/2024 3.7  3.5 - 5.3 mmol/L Final    Chloride 05/23/2024 104  98 - 107 mmol/L Final    Bicarbonate 05/23/2024 28  21 - 32 mmol/L Final    Anion Gap 05/23/2024 11  10 - 20 mmol/L Final    Urea Nitrogen 05/23/2024 17  6 - 23 mg/dL Final    Creatinine 05/23/2024 0.80  0.50 - 1.05 mg/dL Final    eGFR 05/23/2024 81  >60 mL/min/1.73m*2 Final    Calculations of estimated GFR are performed using the 2021 CKD-EPI Study Refit equation without the race variable for the IDMS-Traceable creatinine methods.  https://jasn.asnjournals.org/content/early/2021/09/22/ASN.9908277261    Calcium 05/23/2024 9.3  8.6 - 10.3 mg/dL Final    Albumin 05/23/2024 4.6  3.4 - 5.0 g/dL Final    Alkaline Phosphatase 05/23/2024 70  33 - 136 U/L Final    Total Protein 05/23/2024 7.4  6.4 - 8.2 g/dL Final    AST 05/23/2024 22  9 - 39 U/L Final    Bilirubin, Total 05/23/2024 0.6  0.0 - 1.2 mg/dL Final    ALT 05/23/2024 19  7 - 45 U/L Final    Patients treated with Sulfasalazine may generate falsely decreased results for ALT.       Physical Exam  CONSTITUTIONAL - well nourished, well developed, looks like stated age, in no acute distress, not ill-appearing, and not tired appearing  SKIN - normal skin color and pigmentation, normal skin turgor without rash, lesions, or nodules visualized  HEAD - no trauma, normocephalic  EYES - normal external exam  CHEST -no distressed breathing, good effort, heart regular in rhythm, slight 1-2/6 systolic murmur noted, lungs clear to  auscultation bilaterally  EXTREMITIES - no edema, no deformities  NEUROLOGICAL - normal balance, normal motor, no ataxia  PSYCHIATRIC - alert, pleasant and cordial, age-appropriate    Assessment/Plan         1.  Healthcare maintenance updates  Up-to-date with all screening protocols at this time and we will have your formal physical examination in 6 months     2. Hyperlipidemia  Most recent cholesterol panel showed an elevated LDL at 103  Continue the atorvastatin as currently prescribed by the cardiology providers  I did send in refills to continue this with a goal LDL below 100 and ideally 70  Fasting lab work placed to be done within the next 2-3 months    3. Vitamin D def  Currently on Vit D and Calcium supplementation.   Continue seeing your endocrinologist at [facility].    4. History of bilateral breast cancer  Continue following up with the breast cancer specialty providers at your current appointments    5. Gerd/Hx of barretts eso  A refill of your Dexlansoprazole was sent to your pharmacy.   Please continue following up with your gastroenterologist per their protocol with an EGD being due within the next few years    6.  Ascending aortic aneurysm/trileaflet aortic valve  Patient to have a surgery for aortic ascending aneurysm on 11/5/2024 with Dr. Ramirez  Found on routine CT calcium score screening  Currently asymptomatic  Good luck at your surgery and please notify the office if/when we can be of any assistance    7. Need for Tdap vaccination  Please receive this at the local pharmacy at your earliest mutual convenience

## 2024-10-17 ENCOUNTER — PRE-ADMISSION TESTING (OUTPATIENT)
Dept: PREADMISSION TESTING | Facility: HOSPITAL | Age: 69
End: 2024-10-17
Payer: MEDICARE

## 2024-10-17 ENCOUNTER — HOSPITAL ENCOUNTER (OUTPATIENT)
Dept: RADIOLOGY | Facility: HOSPITAL | Age: 69
Discharge: HOME | End: 2024-10-17
Payer: MEDICARE

## 2024-10-17 VITALS
SYSTOLIC BLOOD PRESSURE: 148 MMHG | BODY MASS INDEX: 26.31 KG/M2 | TEMPERATURE: 97.9 F | HEART RATE: 66 BPM | WEIGHT: 154.1 LBS | OXYGEN SATURATION: 96 % | DIASTOLIC BLOOD PRESSURE: 82 MMHG | HEIGHT: 64 IN

## 2024-10-17 DIAGNOSIS — I77.810 ASCENDING AORTA DILATION (CMS-HCC): ICD-10-CM

## 2024-10-17 DIAGNOSIS — I71.21 ANEURYSM OF ASCENDING AORTA WITHOUT RUPTURE (CMS-HCC): ICD-10-CM

## 2024-10-17 DIAGNOSIS — R79.1 ABNORMAL COAGULATION PROFILE: ICD-10-CM

## 2024-10-17 LAB
ABO GROUP (TYPE) IN BLOOD: NORMAL
ALBUMIN SERPL BCP-MCNC: 4.6 G/DL (ref 3.4–5)
ALP SERPL-CCNC: 74 U/L (ref 33–136)
ALT SERPL W P-5'-P-CCNC: 30 U/L (ref 7–45)
ANION GAP SERPL CALC-SCNC: 13 MMOL/L (ref 10–20)
ANTIBODY SCREEN: NORMAL
APTT PPP: 31 SECONDS (ref 27–38)
AST SERPL W P-5'-P-CCNC: 26 U/L (ref 9–39)
BASOPHILS # BLD AUTO: 0.06 X10*3/UL (ref 0–0.1)
BASOPHILS NFR BLD AUTO: 1.4 %
BILIRUB SERPL-MCNC: 0.7 MG/DL (ref 0–1.2)
BUN SERPL-MCNC: 18 MG/DL (ref 6–23)
CALCIUM SERPL-MCNC: 9.6 MG/DL (ref 8.6–10.6)
CHLORIDE SERPL-SCNC: 105 MMOL/L (ref 98–107)
CO2 SERPL-SCNC: 27 MMOL/L (ref 21–32)
CREAT SERPL-MCNC: 0.74 MG/DL (ref 0.5–1.05)
EGFRCR SERPLBLD CKD-EPI 2021: 89 ML/MIN/1.73M*2
EOSINOPHIL # BLD AUTO: 0.15 X10*3/UL (ref 0–0.7)
EOSINOPHIL NFR BLD AUTO: 3.5 %
ERYTHROCYTE [DISTWIDTH] IN BLOOD BY AUTOMATED COUNT: 12.8 % (ref 11.5–14.5)
GLUCOSE SERPL-MCNC: 93 MG/DL (ref 74–99)
HCT VFR BLD AUTO: 38.3 % (ref 36–46)
HGB BLD-MCNC: 12.4 G/DL (ref 12–16)
IMM GRANULOCYTES # BLD AUTO: 0.01 X10*3/UL (ref 0–0.7)
IMM GRANULOCYTES NFR BLD AUTO: 0.2 % (ref 0–0.9)
INR PPP: 1 (ref 0.9–1.1)
LYMPHOCYTES # BLD AUTO: 1.2 X10*3/UL (ref 1.2–4.8)
LYMPHOCYTES NFR BLD AUTO: 28.4 %
MCH RBC QN AUTO: 30.2 PG (ref 26–34)
MCHC RBC AUTO-ENTMCNC: 32.4 G/DL (ref 32–36)
MCV RBC AUTO: 93 FL (ref 80–100)
MONOCYTES # BLD AUTO: 0.43 X10*3/UL (ref 0.1–1)
MONOCYTES NFR BLD AUTO: 10.2 %
NEUTROPHILS # BLD AUTO: 2.38 X10*3/UL (ref 1.2–7.7)
NEUTROPHILS NFR BLD AUTO: 56.3 %
NRBC BLD-RTO: 0 /100 WBCS (ref 0–0)
PLATELET # BLD AUTO: 296 X10*3/UL (ref 150–450)
POTASSIUM SERPL-SCNC: 4.3 MMOL/L (ref 3.5–5.3)
PROT SERPL-MCNC: 7.4 G/DL (ref 6.4–8.2)
PROTHROMBIN TIME: 11.7 SECONDS (ref 9.8–12.8)
RBC # BLD AUTO: 4.11 X10*6/UL (ref 4–5.2)
RH FACTOR (ANTIGEN D): NORMAL
SODIUM SERPL-SCNC: 141 MMOL/L (ref 136–145)
WBC # BLD AUTO: 4.2 X10*3/UL (ref 4.4–11.3)

## 2024-10-17 PROCEDURE — 86923 COMPATIBILITY TEST ELECTRIC: CPT

## 2024-10-17 PROCEDURE — 71046 X-RAY EXAM CHEST 2 VIEWS: CPT

## 2024-10-17 PROCEDURE — 86901 BLOOD TYPING SEROLOGIC RH(D): CPT

## 2024-10-17 PROCEDURE — 36415 COLL VENOUS BLD VENIPUNCTURE: CPT

## 2024-10-17 PROCEDURE — 87081 CULTURE SCREEN ONLY: CPT

## 2024-10-17 PROCEDURE — 80053 COMPREHEN METABOLIC PANEL: CPT

## 2024-10-17 PROCEDURE — 99205 OFFICE O/P NEW HI 60 MIN: CPT | Performed by: NURSE PRACTITIONER

## 2024-10-17 PROCEDURE — 85610 PROTHROMBIN TIME: CPT

## 2024-10-17 PROCEDURE — 85025 COMPLETE CBC W/AUTO DIFF WBC: CPT

## 2024-10-17 RX ORDER — CHLORHEXIDINE GLUCONATE ORAL RINSE 1.2 MG/ML
SOLUTION DENTAL
Qty: 15 ML | Refills: 0 | Status: SHIPPED | OUTPATIENT
Start: 2024-10-17

## 2024-10-17 RX ORDER — CHLORHEXIDINE GLUCONATE 40 MG/ML
SOLUTION TOPICAL
Qty: 473 ML | Refills: 0 | Status: SHIPPED | OUTPATIENT
Start: 2024-10-17

## 2024-10-17 ASSESSMENT — ENCOUNTER SYMPTOMS
ENDOCRINE NEGATIVE: 1
NECK NEGATIVE: 1
RESPIRATORY NEGATIVE: 1
CONSTITUTIONAL NEGATIVE: 1
CARDIOVASCULAR NEGATIVE: 1
NEUROLOGICAL NEGATIVE: 1
MUSCULOSKELETAL NEGATIVE: 1
GASTROINTESTINAL NEGATIVE: 1

## 2024-10-17 ASSESSMENT — DUKE ACTIVITY SCORE INDEX (DASI)
CAN YOU RUN A SHORT DISTANCE: YES
CAN YOU PARTICIPATE IN STRENOUS SPORTS LIKE SWIMMING, SINGLES TENNIS, FOOTBALL, BASKETBALL, OR SKIING: YES
CAN YOU CLIMB A FLIGHT OF STAIRS OR WALK UP A HILL: YES
CAN YOU DO LIGHT WORK AROUND THE HOUSE LIKE DUSTING OR WASHING DISHES: YES
CAN YOU HAVE SEXUAL RELATIONS: YES
DASI METS SCORE: 9.9
CAN YOU WALK A BLOCK OR TWO ON LEVEL GROUND: YES
CAN YOU TAKE CARE OF YOURSELF (EAT, DRESS, BATHE, OR USE TOILET): YES
CAN YOU DO HEAVY WORK AROUND THE HOUSE LIKE SCRUBBING FLOORS OR LIFTING AND MOVING HEAVY FURNITURE: YES
CAN YOU WALK INDOORS, SUCH AS AROUND YOUR HOUSE: YES
CAN YOU DO YARD WORK LIKE RAKING LEAVES, WEEDING OR PUSHING A MOWER: YES
CAN YOU PARTICIPATE IN MODERATE RECREATIONAL ACTIVITIES LIKE GOLF, BOWLING, DANCING, DOUBLES TENNIS OR THROWING A BASEBALL OR FOOTBALL: YES
TOTAL_SCORE: 58.2
CAN YOU DO MODERATE WORK AROUND THE HOUSE LIKE VACUUMING, SWEEPING FLOORS OR CARRYING GROCERIES: YES

## 2024-10-17 ASSESSMENT — LIFESTYLE VARIABLES: SMOKING_STATUS: SMOKER

## 2024-10-17 NOTE — PREPROCEDURE INSTRUCTIONS
Fasting Guidelines    NPO Instructions:    Do not eat any food after midnight the night before your surgery/procedure.  You may have up to 13.5 ounces of clear liquids until TWO hours before your instructed arrival time to the hospital. This includes water, black tea/coffee, (no milk or cream), apple juice, and/or electrolyte drinks (Gatorade).  You may chew gum up to TWO hours before your surgery/procedure.    Additional Instructions:     We have sent a prescription for Hibiclens soap and Peridex mouth wash to your preferred pharmacy.  If you have not already, Please  your prescription and start using as directed before surgery.  Follow the instruction sheet provided to you at your CPM/PAT appointment.    Avoid herbal supplements, multivitamins and NSAIDS (non-steroidal anti-inflammatory drugs) such as Advil, Aleve, Ibuprofen, Naproxen, Excedrin, Meloxicam or Celebrex for at least 7 days prior to surgery. May take Tylenol as needed.    Avoid tobacco and alcohol products for 24 hours prior to surgery.    CONTACT SURGEON'S OFFICE IF YOU DEVELOP:  * Fever = 100.4 F   * New respiratory symptoms (e.g. cough, shortness of breath, respiratory distress, sore throat)  * Recent loss of taste or smell  *Flu like symptoms such as headache, fatigue or gastrointestinal symptoms  * You develop any open sores, shingles, burning or painful urination   AND/OR:  * You no longer wish to have the surgery.  * Any other personal circumstances change that may lead to the need to cancel or defer this surgery.  *You were admitted to any hospital within one week of your planned procedure.    Seven/Six Days before Surgery:  Review your medication instructions, stop indicated medications    Day of Surgery:  Review your medication instructions, take indicated medications  Wear comfortable loose fitting clothing  Do not use moisturizers, creams, lotions or perfume  All jewelry and valuables should be left at home    Jaret Ireland  [facility]  Mlfqi-[phone]  Mmm-[phone]  Email-[email or web address]      Patient Information: Pre-Operative Infection Prevention Measures     Why did I have my nose, under my arms, and groin swabbed?  The purpose of the swab is to identify Staphylococcus aureus inside your nose or on your skin.  The swab was sent to the laboratory for culture.  A positive swab/culture for Staphylococcus aureus is called colonization or carriage.      What is Staphylococcus aureus?  Staphylococcus aureus, also known as “staph”, is a germ found on the skin or in the nose of healthy people.  Sometimes Staphylococcus aureus can get into the body and cause an infection.  This can be minor (such as pimples, boils, or other skin problems).  It might also be serious (such as a blood infection, pneumonia, or a surgical site infection).    What is Staphylococcus aureus colonization or carriage?  Colonization or carriage means that a person has the germ but is not sick from it.  These bacteria can be spread on the hands or when breathing or sneezing.    How is Staphylococcus aureus spread?  It is most often spread by close contact with a person or item that carries it.    What happens if my culture is positive for Staphylococcus aureus?  Your doctor/medical team will use this information to guide any antibiotic treatment which may be necessary.  Regardless of the culture results, we will clean the inside of your nose with a betadine swab just before you have your surgery.      Will I get an infection if I have Staphylococcus aureus in my nose or on my skin?  Anyone can get an infection with Staphylococcus aureus.  However, the best way to reduce your risk of infection is to follow the instructions provided to you for the use of your CHG soap and dental rinse.        Patient Information: Oral/Dental Rinse    What is oral/dental rinse?   It is a mouthwash. It is a way of cleaning the mouth with a  germ-killing solution before your surgery.  The solution contains chlorhexidine, commonly known as CHG.   It is used inside the mouth to kill a bacteria known as Staphylococcus aureus.  Let your doctor know if you are allergic to Chlorhexidine.    Why do I need to use CHG oral/dental rinse?  The CHG oral/dental rinse helps to kill a bacteria in your mouth known as Staphylococcus aureus.     This reduces the risk of infection at the surgical site.      Using your CHG oral/dental rinse  STEPS:  Use your CHG oral/dental rinse after you brush your teeth the night before (at bedtime) and the morning of your surgery.  Follow all directions on your prescription label.    Use the cap on the container to measure 15ml   Swish (gargle if you can) the mouthwash in your mouth for at least 30 seconds, (do not swallow) and spit out  After you use your CHG rinse, do not rinse your mouth with water, drink or eat.  Please refer to the prescription label for the appropriate time to resume oral intake      What side effects might I have using the CHG oral/dental rinse?  CHG rinse will stick to plaque on the teeth.  Brush and floss just before use.  Teeth brushing will help avoid staining of plaque during use.      Patient Information: Home Preoperative Antibacterial Shower      What is a home preoperative antibacterial shower?  This shower is a way of cleaning the skin with a germ-killing solution before surgery.  The solution contains chlorhexidine, commonly known as CHG.  CHG is a skin cleanser with germ-killing ability.  Let your doctor know if you are allergic to chlorhexidine.    Why do I need to take a preoperative antibacterial shower?  Skin is not sterile.  It is best to try to make your skin as free of germs as possible before surgery.  Proper cleansing with a germ-killing soap before surgery can lower the number of germs on your skin.  This helps to reduce the risk of infection at the surgical site.  Following the  instructions listed below will help you prepare your skin for surgery.      How do I use the solution?  Steps:  Begin using your CHG soap 5 days before your scheduled surgery on ________________________.    First, wash and rinse your hair using the CHG soap. Keep CHG soap away from ear canals and eyes.  Rinse completely, do not condition.  Hair extensions should be removed.  Wash your face with your normal soap and rinse.    Apply the CHG solution to a clean wet washcloth.  Turn the water off or move away from the water spray to avoid premature rinsing of the CHG soap as you are applying.   Firmly lather your entire body from the neck down.  Do not use on your face.  Pay special attention to the area(s) where your incision(s) will be located unless they are on your face.  Avoid scrubbing your skin too hard.  The important point is to have the CHG soap sit on your skin for 3 minutes.    When the 3 minutes are up, turn on the water and rinse the CHG solution off your body completely.   DO NOT wash with regular soap after you have used the CHG soap solution  Pat yourself dry with a clean, freshly-laundered towel.  DO NOT apply powders, deodorants, or lotions.  Dress in clean, freshly laundered nightclothes.    Be sure to sleep with clean, freshly laundered sheets.  Be aware that CHG will cause stains on fabrics; if you wash them with bleach after use.  Rinse your washcloth and other linens that have contact with CHG completely.  Use only non-chlorine detergents to launder the items used.   The morning of surgery is the fifth day.  Repeat the above steps and dress in clean comfortable clothing     Whom should I contact if I have any questions regarding the use of CHG soap?  Call the [facility] at [phone] if you have any questions.               Preoperative Brain Exercises    What are brain exercises?  A brain exercise is any activity that  engages your thinking (cognitive) skills.    What types of activities are considered brain exercises?  Jigsaw puzzles, crossword puzzles, word jumble, memory games, word search, and many more.  Many can be found free online or on your phone via a mobile christal.    Why should I do brain exercises before my surgery?  More recent research has shown brain exercise before surgery can lower the risk of postoperative delirium (confusion) which can be especially important for older adults.  Patients who did brain exercises for 5 to 10 hours the days before surgery, cut their risk of postoperative delirium in half up to 1 week after surgery.         [facility]    Preoperative Deep Breathing Exercises    Why it is important to do deep breathing exercises before my surgery?  Deep breathing exercises strengthen your breathing muscles.  This helps you to recover after your surgery and decreases the chance of breathing complications.      How are the deep breathing exercises done?  Sit straight with your back supported.  Breathe in deeply and slowly through your nose. Your lower rib cage should expand and your abdomen may move forward.  Hold that breath for 3 to 5 seconds.  Breathe out through pursed lips, slowly and completely.  Rest and repeat 10 times every hour while awake.  Rest longer if you become dizzy or lightheaded.         Patient and Family Education             Ways You Can Help Prevent Blood Clots             This handout explains some simple things you can do to help prevent blood clots.      Blood clots are blockages that can form in the body's veins. When a blood clot forms in your deep veins, it may be called a deep vein thrombosis, or DVT for short. Blood clots can happen in any part of the body where blood flows, but they are most common in the arms and legs. If a piece of a blood clot breaks free and travels to the lungs, it is called a pulmonary embolus (PE). A PE can be a very  serious problem.         Being in the hospital or having surgery can raise your chances of getting a blood clot because you may not be well enough to move around as much as you normally do.         Ways you can help prevent blood clots in the hospital         Wearing SCDs. SCDs stands for Sequential Compression Devices.   SCDs are special sleeves that wrap around your legs  They attach to a pump that fills them with air to gently squeeze your legs every few minutes.   This helps return the blood in your legs to your heart.   SCDs should only be taken off when walking or bathing.   SCDs may not be comfortable, but they can help save your life.               Wearing compression stockings - if your doctor orders them. These special snug fitting stockings gently squeeze your legs to help blood flow.       Walking. Walking helps move the blood in your legs.   If your doctor says it is ok, try walking the halls at least   5 times a day. Ask us to help you get up, so you don't fall.      Taking any blood thinning medicines your doctor orders.        Page 1 of 2     [facility]; 3/23   Ways you can help prevent blood clots at home       Wearing compression stockings - if your doctor orders them. ? Walking - to help move the blood in your legs.       Taking any blood thinning medicines your doctor orders.      Signs of a blood clot or PE      Tell your doctor or nurse know right away if you have of the problems listed below.    If you are at home, seek medical care right away. Call 911 for chest pain or problems breathing.               Signs of a blood clot (DVT) - such as pain,  swelling, redness or warmth in your arm or leg      Signs of a pulmonary embolism (PE) - such as chest     pain or feeling short of breath

## 2024-10-17 NOTE — H&P (VIEW-ONLY)
CPM/PAT Evaluation       Name: Kiley Ward (Kiley Ward)  /Age: 1955/69 y.o.     Visit Type:   In-Person       Chief Complaint: dilated ascending aorta    HPI  The patient is a 69 year old  female with history of ascending aortic aneurysm with a trileaflet aortic valve found incidentally on screening CT. She currently denies fever, chills, n/v, abdominal pain, chest pain, sob or syncope. She presents today for perioperative evaluation in anticipation of Median Sternotomy, Replace/Repair Aortic Valve and Replace Aortic Root, Ascending Aorta and hemiarch, hypothermic circulatory arrest, retrograde cerebral perfusion, axillary artery cannulation, +/- coronary surgery on 24 with Dr. Ramirez.     Past Medical History:   Diagnosis Date    ARMD (age related macular degeneration)     Ascending aorta dilation (CMS-HCC)     Crocker's esophagus     Breast cancer (Multi)     s/p lumpectomy and sentinel lymph node biopsy chemotherapy and radiation.    Clotting disorder (Multi)     DVT (deep venous thrombosis) (Multi)     prior left upper extremity DVT treated with Eliquis for 6 months., unproked    GERD (gastroesophageal reflux disease)     Hx antineoplastic chemo     Hyperlipidemia     Hypertension     Hyperthyroidism     Murmur     Personal history of irradiation     PVD (posterior vitreous detachment), both eyes     Type 2 diabetes mellitus     Vitamin D deficiency        Past Surgical History:   Procedure Laterality Date    BREAST LUMPECTOMY Bilateral     CARDIAC CATHETERIZATION N/A 2024    Procedure: Left Heart Cath with Coronary Angiography and LV;  Surgeon: Raul Muniz MD;  Location: [facility];  Service: Cardiovascular;  Laterality: N/A;    COLONOSCOPY      ESOPHAGOGASTRODUODENOSCOPY  2023       Patient Sexual activity questions deferred to the physician.    Family History   Problem Relation Name Age of Onset    Breast cancer Mother      Prostate cancer Father       Breast cancer Sister      Glaucoma Neg Hx         No Known Allergies    Prior to Admission medications    Medication Sig Start Date End Date Taking? Authorizing Provider   atorvastatin (Lipitor) 10 mg tablet Take 1 tablet (10 mg) by mouth once daily at bedtime. 10/10/24  Yes Ante T Pletikosic, DO   calcitriol (Rocaltrol) 0.25 mcg capsule Take 1 capsule (0.25 mcg) by mouth once daily. 4/4/17  Yes Historical Provider, MD   carvedilol (Coreg) 6.25 mg tablet Take 1 tablet (6.25 mg) by mouth 2 times daily (morning and late afternoon). 9/25/24 9/25/25 Yes Raul Muniz MD   dexlansoprazole (Dexilant) 60 mg DR capsule Take 1 capsule (60 mg) by mouth once daily. 10/10/24  Yes Ante T Pletikosic, DO   ergocalciferol (Vitamin D-2) 1.25 MG (30977 UT) capsule  9/19/23  Yes Historical Provider, MD   famotidine (Pepcid) 40 mg tablet Take 1 tablet (40 mg) by mouth once daily.   Yes Historical Provider, MD   mv-min/FA/vit K/lutein/zeaxant (PRESERVISION AREDS 2 PLUS MV ORAL) Take 1 capsule by mouth once daily.   Yes Historical Provider, MD   aspirin 81 mg EC tablet Take by mouth.  Patient not taking: Reported on 10/17/2024    Historical Provider, MD   exemestane (Aromasin) 25 mg tablet Take by mouth once daily.  Patient not taking: Reported on 10/17/2024 4/4/17   Historical Provider, MD HARRIS ROS:   Constitutional:   neg    Neuro/Psych:   neg    Eyes:    visual disturbance  Ears:   neg    Nose:   neg    Mouth:   neg    Throat:   neg    Neck:   neg    Cardio:   neg    Respiratory:   neg    Endocrine:   neg    GI:   neg    :   neg    Musculoskeletal:   neg    Hematologic:   neg    Skin:  neg        Physical Exam  Vitals reviewed.   Constitutional:       Appearance: Normal appearance.   HENT:      Head: Normocephalic and atraumatic.      Nose: Nose normal.      Mouth/Throat:      Mouth: Mucous membranes are moist.      Pharynx: Oropharynx is clear.   Eyes:      Extraocular Movements: Extraocular movements intact.       "Conjunctiva/sclera: Conjunctivae normal.      Pupils: Pupils are equal, round, and reactive to light.   Cardiovascular:      Rate and Rhythm: Normal rate and regular rhythm.      Pulses: Normal pulses.      Heart sounds: Murmur (RSB II/VI systolic, non radiating) heard.   Pulmonary:      Effort: Pulmonary effort is normal.      Breath sounds: Normal breath sounds.   Musculoskeletal:         General: Normal range of motion.      Cervical back: Normal range of motion.   Skin:     General: Skin is warm and dry.   Neurological:      General: No focal deficit present.      Mental Status: She is alert and oriented to person, place, and time.   Psychiatric:         Mood and Affect: Mood normal.         Behavior: Behavior normal.          PAT AIRWAY:   Airway:     Mallampati::  I    TM distance::  >3 FB    Neck ROM::  Full  normal        Visit Vitals  /82   Pulse 66   Temp 36.6 °C (97.9 °F)   Ht 1.626 m (5' 4\")   Wt 69.9 kg (154 lb 1.6 oz)   SpO2 96%   BMI 26.45 kg/m²   OB Status Postmenopausal   Smoking Status Never   BSA 1.78 m²          DASI Risk Score      Flowsheet Row Pre-Admission Testing from 10/17/2024 in [facility]   Can you take care of yourself (eat, dress, bathe, or use toilet)?  2.75 filed at 10/17/2024 1018   Can you walk indoors, such as around your house? 1.75 filed at 10/17/2024 1018   Can you walk a block or two on level ground?  2.75 filed at 10/17/2024 1018   Can you climb a flight of stairs or walk up a hill? 5.5 filed at 10/17/2024 1018   Can you run a short distance? 8 filed at 10/17/2024 1018   Can you do light work around the house like dusting or washing dishes? 2.7 filed at 10/17/2024 1018   Can you do moderate work around the house like vacuuming, sweeping floors or carrying groceries? 3.5 filed at 10/17/2024 1018   Can you do heavy work around the house like scrubbing floors or lifting and moving heavy furniture?  8 filed at 10/17/2024 1018   Can you do yard work like " raking leaves, weeding or pushing a mower? 4.5 filed at 10/17/2024 1018   Can you have sexual relations? 5.25 filed at 10/17/2024 1018   Can you participate in moderate recreational activities like golf, bowling, dancing, doubles tennis or throwing a baseball or football? 6 filed at 10/17/2024 1018   Can you participate in strenous sports like swimming, singles tennis, football, basketball, or skiing? 7.5 filed at 10/17/2024 1018   DASI SCORE 58.2 filed at 10/17/2024 1018   METS Score (Will be calculated only when all the questions are answered) 9.9 filed at 10/17/2024 1018          Caprini DVT Assessment      Flowsheet Row Pre-Admission Testing from 10/17/2024 in [facility] Admission (Discharged) from 9/27/2024 in [facility] with Raul Muniz MD   DVT Score 12 filed at 10/17/2024 1035 2 filed at 09/27/2024 0817   Medical Factors Present cancer, chemotherapy, or previous malignancy, History of DVT/PE filed at 10/17/2024 1035 Medical patient at bedrest filed at 09/27/2024 0817   Surgical Factors Major surgery planned, lasting over 3 hours filed at 10/17/2024 1035 Minor surgery planned filed at 09/27/2024 0817   BMI 30 or less filed at 10/17/2024 1035 --          Modified Frailty Index      Flowsheet Row Pre-Admission Testing from 10/17/2024 in [facility]   Non-independent functional status (problems with dressing, bathing, personal grooming, or cooking) 0 filed at 10/17/2024 1036   History of diabetes mellitus  0 filed at 10/17/2024 1036   History of COPD 0 filed at 10/17/2024 1036   History of CHF No filed at 10/17/2024 1036   History of MI 0 filed at 10/17/2024 1036   History of Percutaneous Coronary Intervention, Cardiac Surgery, or Angina No filed at 10/17/2024 1036   Hypertension requiring the use of medication  0.0909 filed at 10/17/2024 1036   Peripheral vascular disease 0 filed at 10/17/2024 1036   Impaired sensorium (cognitive impairement or loss, clouding,  or delirium) 0 filed at 10/17/2024 1036   TIA or CVA withouy residual deficit 0 filed at 10/17/2024 1036   Cerebrovascular accident with deficit 0 filed at 10/17/2024 1036   Modified Frailty Index Calculator .0909 filed at 10/17/2024 1036          CHADS2 Stroke Risk  Current as of 6 hours ago        N/A 3 to 100%: High Risk   2 to < 3%: Medium Risk   0 to < 2%: Low Risk     Last Change: N/A          This score determines the patient's risk of having a stroke if the patient has atrial fibrillation.        This score is not applicable to this patient. Components are not calculated.          Revised Cardiac Risk Index      Flowsheet Row Pre-Admission Testing from 10/17/2024 in [facility]   High-Risk Surgery (Intraperitoneal, Intrathoracic,Suprainguinal vascular) 1 filed at 10/17/2024 1036   History of ischemic heart disease (History of MI, History of positive exercuse test, Current chest paint considered due to myocardial ischemia, Use of nitrate therapy, ECG with pathological Q Waves) 0 filed at 10/17/2024 1036   History of congestive heart failure (pulmonary edemia, bilateral rales or S3 gallop, Paroxysmal nocturnal dyspnea, CXR showing pulmonary vascular redistribution) 0 filed at 10/17/2024 1036   History of cerebrovascular disease (Prior TIA or stroke) 0 filed at 10/17/2024 1036   Pre-operative insulin treatment 0 filed at 10/17/2024 1036   Pre-operative creatinine>2 mg/dl 0 filed at 10/17/2024 1036   Revised Cardiac Risk Calculator 1 filed at 10/17/2024 1036          Apfel Simplified Score      Flowsheet Row Pre-Admission Testing from 10/17/2024 in [facility]   Smoking status 0 filed at 10/17/2024 1036   History of motion sickness or PONV  0 filed at 10/17/2024 1036   Use of postoperative opioids 1 filed at 10/17/2024 1036   Gender - Female 1=Yes filed at 10/17/2024 1036   Apfel Simplified Score Calculator 2 filed at 10/17/2024 1036          Risk Analysis Index Results This  Encounter    No data found in the last 10 encounters.       Stop Bang Score      Flowsheet Row Pre-Admission Testing from 10/17/2024 in [facility]   Do you snore loudly? 0 filed at 10/17/2024 1017   Do you often feel tired or fatigued after your sleep? 0 filed at 10/17/2024 1017   Has anyone ever observed you stop breathing in your sleep? 0 filed at 10/17/2024 1017   Do you have or are you being treated for high blood pressure? 1 filed at 10/17/2024 1017   Recent BMI (Calculated) 25.7 filed at 10/17/2024 1017   Is BMI greater than 35 kg/m2? 0=No filed at 10/17/2024 1017   Age older than 50 years old? 1=Yes filed at 10/17/2024 1017   Is your neck circumference greater than 17 inches (Male) or 16 inches (Female)? 0 filed at 10/17/2024 1017   Gender - Male 0=No filed at 10/17/2024 1017   STOP-BANG Total Score 2 filed at 10/17/2024 1017          Recent Results (from the past week)   Coagulation Screen    Collection Time: 10/17/24 11:03 AM   Result Value Ref Range    Protime 11.7 9.8 - 12.8 seconds    INR 1.0 0.9 - 1.1    aPTT 31 27 - 38 seconds   Type And Screen    Collection Time: 10/17/24 11:03 AM   Result Value Ref Range    ABO TYPE O     Rh TYPE POS     ANTIBODY SCREEN NEG    Staphylococcus aureus/MRSA colonization, Culture    Collection Time: 10/17/24 11:03 AM    Specimen: Anterior Nares; Swab   Result Value Ref Range    Staph/MRSA Screen Culture No Staphylococcus aureus isolated    CBC and Auto Differential    Collection Time: 10/17/24 11:03 AM   Result Value Ref Range    WBC 4.2 (L) 4.4 - 11.3 x10*3/uL    nRBC 0.0 0.0 - 0.0 /100 WBCs    RBC 4.11 4.00 - 5.20 x10*6/uL    Hemoglobin 12.4 12.0 - 16.0 g/dL    Hematocrit 38.3 36.0 - 46.0 %    MCV 93 80 - 100 fL    MCH 30.2 26.0 - 34.0 pg    MCHC 32.4 32.0 - 36.0 g/dL    RDW 12.8 11.5 - 14.5 %    Platelets 296 150 - 450 x10*3/uL    Neutrophils % 56.3 40.0 - 80.0 %    Immature Granulocytes %, Automated 0.2 0.0 - 0.9 %    Lymphocytes % 28.4 13.0 - 44.0 %     Monocytes % 10.2 2.0 - 10.0 %    Eosinophils % 3.5 0.0 - 6.0 %    Basophils % 1.4 0.0 - 2.0 %    Neutrophils Absolute 2.38 1.20 - 7.70 x10*3/uL    Immature Granulocytes Absolute, Automated 0.01 0.00 - 0.70 x10*3/uL    Lymphocytes Absolute 1.20 1.20 - 4.80 x10*3/uL    Monocytes Absolute 0.43 0.10 - 1.00 x10*3/uL    Eosinophils Absolute 0.15 0.00 - 0.70 x10*3/uL    Basophils Absolute 0.06 0.00 - 0.10 x10*3/uL   Comprehensive Metabolic Panel    Collection Time: 10/17/24 11:03 AM   Result Value Ref Range    Glucose 93 74 - 99 mg/dL    Sodium 141 136 - 145 mmol/L    Potassium 4.3 3.5 - 5.3 mmol/L    Chloride 105 98 - 107 mmol/L    Bicarbonate 27 21 - 32 mmol/L    Anion Gap 13 10 - 20 mmol/L    Urea Nitrogen 18 6 - 23 mg/dL    Creatinine 0.74 0.50 - 1.05 mg/dL    eGFR 89 >60 mL/min/1.73m*2    Calcium 9.6 8.6 - 10.6 mg/dL    Albumin 4.6 3.4 - 5.0 g/dL    Alkaline Phosphatase 74 33 - 136 U/L    Total Protein 7.4 6.4 - 8.2 g/dL    AST 26 9 - 39 U/L    Bilirubin, Total 0.7 0.0 - 1.2 mg/dL    ALT 30 7 - 45 U/L        Diagnostic Results    -Cardiac Cath; 9/27/24  CONCLUSIONS:   1. Minimal luminal irregularities in a right dominant system.     -Transthoracic Echo; 5/31/24  CONCLUSIONS:   1. Left ventricular systolic function is low normal with a 50-55% estimated ejection fraction.   2. Spectral Doppler shows an impaired relaxation pattern of left ventricular diastolic filling.   3. The left atrium is mild to moderately dilated.   4. Mildly elevated RVSP.   5. Mild aortic valve stenosis.   6. Mild aortic valve regurgitation.   7. There is moderate dilatation of the ascending aorta.     - CT ANGIO CHEST W AND WO IV CONTRAST; 5/29/2024   IMPRESSION:  1. Ascending aortic fusiform aneurysm measuring 5.2 cm in its maximum  dimension at the level of the main pulmonary artery and returns to  normal at the level of the mid arch. Recommend follow-up radiation  sparing cardiac MRI and thoracic MRA to assess status of the aortic  valve  and size and extent of the aneurysm in approximately 12 months.  2. The sinotubular junction is effaced.  3. The aortic root is dilated.  4. The central pulmonary arteries appear dilated correlate with  elevated right-sided filling pressures.     - CT CARDIAC SCORING WO IV CONTRAST; 5/13/2024   IMPRESSION:  1. Coronary artery calcium score of 0*.  2. There is aneurysmal dilatation of the ascending aorta (5.2 cm).  Recommend dedicated thoracic CTA for definitive assessment. Critical  Finding:  See findings. Notification was initiated on 5/13/2024 at  9:20 am by  Suzette Olivares.  (**-OCF-**) Instructions:     -CT ANGIO CHEST W AND WO IV CONTRAST; 5/29/2024   IMPRESSION:  1. Ascending aortic fusiform aneurysm measuring 5.2 cm in its maximum  dimension at the level of the main pulmonary artery and returns to  normal at the level of the mid arch. Recommend follow-up radiation  sparing cardiac MRI and thoracic MRA to assess status of the aortic  valve and size and extent of the aneurysm in approximately 12 months.  2. The sinotubular junction is effaced.  3. The aortic root is dilated.  4. The central pulmonary arteries appear dilated correlate with  elevated right-sided filling pressures.       Assessment and Plan:     Anesthesia:  The patient denies problems with anesthesia in the past such as PONV, prolonged sedation, awareness, dental damage, aspiration, cardiac arrest, difficult intubation, or unexpected hospital admissions.     Cardiovascular  #HTN  -Given her prior history of receiving Herceptin, her elevated blood pressure, and dilated ascending aorta she was started on carvedilol 6.25mg po BID, instructed to take morning of surgery. BP today 148/82.  #HLD  -managed on atorvastatin, continue as prescribed in the perioperative period  #Dilated ascending aorta  -scheduled for surgery with Dr. Ramirez on 11/5/24.  She is scheduled for cardiac surgery.  Preoperative evaluation is complete pending results of labs, urine,  minh cxray.  Cardiology Evaluation  The patient follows with cardiology, Dr. Raul Muniz. Patient was last seen 9/25/24. Follow up in 3 months   METS  The patient's functional capacity capacity is greater than 4 METS.  RCRI  The patient meets 1 RCRI criteria and therefore has a 6% risk of major adverse cardiac complications.  LUIS ANTONIO score which indicates a 0.8% risk of intraoperative or 30-day postoperative MACE (major adverse cardiac event).    Pulmonary   No significant findings on chart review or clinical presentation and evaluation. The patient is at increased risk of perioperative pulmonary complications secondary to thoracic surgery, ongoing tobacco abuse, advanced age greater than 60. Smoking cessation discussed.    The patient has a stop bang score of 2, which places patient at low risk for having SAGAR.    ARISCAT 50, High, 42.1% risk of in-hospital postoperative pulmonary complications  PRODIGY 8, intermediate risk of respiratory depression episode. Patient given PI sheet for preoperative deep breathing exercises.    Neuro:   The patient has no neurological diagnoses or significant findings on chart review, clinical presentation, and evaluation. No grossly apparent neurological perioperative risk. The patient is at increased risk for postoperative delirium secondary to age 65 or older. The patient is at increased risk for perioperative stroke secondary to hypertension , increased age, hyperlipidemia, female gender, general anesthesia, operative time >2.5 hours, cardiac or emergency surgery. Handouts for preoperative brain exercises given to patient.    HEENT/Airway  #ARMD  #PVD  -follows with ophthalmology, Dr. Indra Torres  No documented or reported history of airway difficulty.     Endocrine  No diagnoses or significant findings on chart review or clinical presentation and evaluation.    Gastrointestinal  #GERD  -managed on dexilant and famotidine, continue as prescribed in the perioperative period. No  current GI complaints.  GI:     Eat 10- 12,  self-perceived oropharyngeal dysphagia scale (0-40)     Genitourinary  No diagnoses or significant findings on chart review or clinical presentation and evaluation.    Renal  No renal diagnoses or significant findings on chart review or clinical presentation and evaluation. The patient has specific risk factors associated with increased risk of perioperative renal complications related to age greater than 55, hypertension. Preventative measures include preoperative hydration.    Hematology/Oncology  #Breast cancer  -diagnosed with right-sided breast cancer in 2010 and underwent lumpectomy with radiation and chemotherapy that included Herceptin. She then had left-sided breast cancer diagnosed in 2014 and underwent a lumpectomy, chemotherapy, and radiation therapy. Currently on Aromasin, continue as prescribed in the perioperative period    #DVT  -left upper extremity DVT in approximately 2019 that was incidentally discovered during a thyroid ultrasound. She was treated with Eliquis for 6 months. No recurrence.    Caprini score 12, high risk of perioperative VTE.     Patient instructed to ambulate as soon as possible postoperatively to decrease thromboembolic risk. Initiate mechanical DVT prophylaxis as soon as possible and initiate chemical prophylaxis when deemed safe from a bleeding standpoint post surgery.     Transfusion Evaluation  A type and screen was obtained given the likelihood for perioperative transfusion of blood or blood products.    Musculoskeletal  No diagnoses or significant findings on chart review or clinical presentation and evaluation.    ID  General Surgery: YOSVANY Baird-CNP LOV 8/13/24 seen for right breast mastitis    MRSA screening obtained. Prescriptions and instructions given for Hibiclens and Peridex.    -Preoperative medication instructions were provided and reviewed with the patient.  Any additional testing or evaluation was explained  to the patient.  NPO Instructions were discussed, and the patient's questions were answered prior to conclusion of this encounter. Patient verbalized understanding of preoperative instructions. After Visit Summary given.

## 2024-10-17 NOTE — CPM/PAT H&P
CPM/PAT Evaluation       Name: Kiley Ward (Kiley Ward)  /Age: 1955/69 y.o.     Visit Type:   In-Person       Chief Complaint: dilated ascending aorta    HPI  The patient is a 69 year old  female with history of ascending aortic aneurysm with a trileaflet aortic valve found incidentally on screening CT. She currently denies fever, chills, n/v, abdominal pain, chest pain, sob or syncope. She presents today for perioperative evaluation in anticipation of Median Sternotomy, Replace/Repair Aortic Valve and Replace Aortic Root, Ascending Aorta and hemiarch, hypothermic circulatory arrest, retrograde cerebral perfusion, axillary artery cannulation, +/- coronary surgery on 24 with Dr. Ramirez.     Past Medical History:   Diagnosis Date    ARMD (age related macular degeneration)     Ascending aorta dilation (CMS-HCC)     Crocker's esophagus     Breast cancer (Multi)     s/p lumpectomy and sentinel lymph node biopsy chemotherapy and radiation.    Clotting disorder (Multi)     DVT (deep venous thrombosis) (Multi)     prior left upper extremity DVT treated with Eliquis for 6 months., unproked    GERD (gastroesophageal reflux disease)     Hx antineoplastic chemo     Hyperlipidemia     Hypertension     Hyperthyroidism     Murmur     Personal history of irradiation     PVD (posterior vitreous detachment), both eyes     Type 2 diabetes mellitus     Vitamin D deficiency        Past Surgical History:   Procedure Laterality Date    BREAST LUMPECTOMY Bilateral     CARDIAC CATHETERIZATION N/A 2024    Procedure: Left Heart Cath with Coronary Angiography and LV;  Surgeon: Raul Muniz MD;  Location: [facility] Card[facility];  Service: Cardiovascular;  Laterality: N/A;    COLONOSCOPY      ESOPHAGOGASTRODUODENOSCOPY  2023       Patient Sexual activity questions deferred to the physician.    Family History   Problem Relation Name Age of Onset    Breast cancer Mother      Prostate cancer Father       Breast cancer Sister      Glaucoma Neg Hx         No Known Allergies    Prior to Admission medications    Medication Sig Start Date End Date Taking? Authorizing Provider   atorvastatin (Lipitor) 10 mg tablet Take 1 tablet (10 mg) by mouth once daily at bedtime. 10/10/24  Yes Ante T Pletikosic, DO   calcitriol (Rocaltrol) 0.25 mcg capsule Take 1 capsule (0.25 mcg) by mouth once daily. 4/4/17  Yes Historical Provider, MD   carvedilol (Coreg) 6.25 mg tablet Take 1 tablet (6.25 mg) by mouth 2 times daily (morning and late afternoon). 9/25/24 9/25/25 Yes Raul Muniz MD   dexlansoprazole (Dexilant) 60 mg DR capsule Take 1 capsule (60 mg) by mouth once daily. 10/10/24  Yes Ante T Pletikosic, DO   ergocalciferol (Vitamin D-2) 1.25 MG (07585 UT) capsule  9/19/23  Yes Historical Provider, MD   famotidine (Pepcid) 40 mg tablet Take 1 tablet (40 mg) by mouth once daily.   Yes Historical Provider, MD   mv-min/FA/vit K/lutein/zeaxant (PRESERVISION AREDS 2 PLUS MV ORAL) Take 1 capsule by mouth once daily.   Yes Historical Provider, MD   aspirin 81 mg EC tablet Take by mouth.  Patient not taking: Reported on 10/17/2024    Historical Provider, MD   exemestane (Aromasin) 25 mg tablet Take by mouth once daily.  Patient not taking: Reported on 10/17/2024 4/4/17   Historical Provider, MD HARRIS ROS:   Constitutional:   neg    Neuro/Psych:   neg    Eyes:    visual disturbance  Ears:   neg    Nose:   neg    Mouth:   neg    Throat:   neg    Neck:   neg    Cardio:   neg    Respiratory:   neg    Endocrine:   neg    GI:   neg    :   neg    Musculoskeletal:   neg    Hematologic:   neg    Skin:  neg        Physical Exam  Vitals reviewed.   Constitutional:       Appearance: Normal appearance.   HENT:      Head: Normocephalic and atraumatic.      Nose: Nose normal.      Mouth/Throat:      Mouth: Mucous membranes are moist.      Pharynx: Oropharynx is clear.   Eyes:      Extraocular Movements: Extraocular movements intact.       "Conjunctiva/sclera: Conjunctivae normal.      Pupils: Pupils are equal, round, and reactive to light.   Cardiovascular:      Rate and Rhythm: Normal rate and regular rhythm.      Pulses: Normal pulses.      Heart sounds: Murmur (RSB II/VI systolic, non radiating) heard.   Pulmonary:      Effort: Pulmonary effort is normal.      Breath sounds: Normal breath sounds.   Musculoskeletal:         General: Normal range of motion.      Cervical back: Normal range of motion.   Skin:     General: Skin is warm and dry.   Neurological:      General: No focal deficit present.      Mental Status: She is alert and oriented to person, place, and time.   Psychiatric:         Mood and Affect: Mood normal.         Behavior: Behavior normal.          PAT AIRWAY:   Airway:     Mallampati::  I    TM distance::  >3 FB    Neck ROM::  Full  normal        Visit Vitals  /82   Pulse 66   Temp 36.6 °C (97.9 °F)   Ht 1.626 m (5' 4\")   Wt 69.9 kg (154 lb 1.6 oz)   SpO2 96%   BMI 26.45 kg/m²   OB Status Postmenopausal   Smoking Status Never   BSA 1.78 m²          DASI Risk Score      Flowsheet Row Pre-Admission Testing from 10/17/2024 in [facility]   Can you take care of yourself (eat, dress, bathe, or use toilet)?  2.75 filed at 10/17/2024 1018   Can you walk indoors, such as around your house? 1.75 filed at 10/17/2024 1018   Can you walk a block or two on level ground?  2.75 filed at 10/17/2024 1018   Can you climb a flight of stairs or walk up a hill? 5.5 filed at 10/17/2024 1018   Can you run a short distance? 8 filed at 10/17/2024 1018   Can you do light work around the house like dusting or washing dishes? 2.7 filed at 10/17/2024 1018   Can you do moderate work around the house like vacuuming, sweeping floors or carrying groceries? 3.5 filed at 10/17/2024 1018   Can you do heavy work around the house like scrubbing floors or lifting and moving heavy furniture?  8 filed at 10/17/2024 1018   Can you do yard work like " raking leaves, weeding or pushing a mower? 4.5 filed at 10/17/2024 1018   Can you have sexual relations? 5.25 filed at 10/17/2024 1018   Can you participate in moderate recreational activities like golf, bowling, dancing, doubles tennis or throwing a baseball or football? 6 filed at 10/17/2024 1018   Can you participate in strenous sports like swimming, singles tennis, football, basketball, or skiing? 7.5 filed at 10/17/2024 1018   DASI SCORE 58.2 filed at 10/17/2024 1018   METS Score (Will be calculated only when all the questions are answered) 9.9 filed at 10/17/2024 1018          Caprini DVT Assessment      Flowsheet Row Pre-Admission Testing from 10/17/20[facility] in [facility] Admission (Discharged) from 9/27/2024 in [facility] with Raul Muniz MD   DVT Score 12 filed at 10/17/2024 1035 2 filed at 09/27/2024 0817   Medical Factors Present cancer, chemotherapy, or previous malignancy, History of DVT/PE filed at 10/17/2024 1035 Medical patient at bedrest filed at 09/27/2024 0817   Surgical Factors Major surgery planned, lasting over 3 hours filed at 10/17/2024 1035 Minor surgery planned filed at 09/27/2024 0817   BMI 30 or less filed at 10/17/2024 1035 --          Modified Frailty Index      Flowsheet Row Pre-Admission Testing from 10/17/2024 in [facility]   Non-independent functional status (problems with dressing, bathing, personal grooming, or cooking) 0 filed at 10/17/2024 1036   History of diabetes mellitus  0 filed at 10/17/2024 1036   History of COPD 0 filed at 10/17/2024 1036   History of CHF No filed at 10/17/2024 1036   History of MI 0 filed at 10/17/2024 1036   History of Percutaneous Coronary Intervention, Cardiac Surgery, or Angina No filed at 10/17/2024 1036   Hypertension requiring the use of medication  0.0909 filed at 10/17/2024 1036   Peripheral vascular disease 0 filed at 10/17/2024 1036   Impaired sensorium (cognitive impairement or loss, clouding,  or delirium) 0 filed at 10/17/2024 1036   TIA or CVA withouy residual deficit 0 filed at 10/17/2024 1036   Cerebrovascular accident with deficit 0 filed at 10/17/2024 1036   Modified Frailty Index Calculator .0909 filed at 10/17/2024 1036          CHADS2 Stroke Risk  Current as of 6 hours ago        N/A 3 to 100%: High Risk   2 to < 3%: Medium Risk   0 to < 2%: Low Risk     Last Change: N/A          This score determines the patient's risk of having a stroke if the patient has atrial fibrillation.        This score is not applicable to this patient. Components are not calculated.          Revised Cardiac Risk Index      Flowsheet Row Pre-Admission Testing from 10/17/2024 in [facility]   High-Risk Surgery (Intraperitoneal, Intrathoracic,Suprainguinal vascular) 1 filed at 10/17/2024 1036   History of ischemic heart disease (History of MI, History of positive exercuse test, Current chest paint considered due to myocardial ischemia, Use of nitrate therapy, ECG with pathological Q Waves) 0 filed at 10/17/2024 1036   History of congestive heart failure (pulmonary edemia, bilateral rales or S3 gallop, Paroxysmal nocturnal dyspnea, CXR showing pulmonary vascular redistribution) 0 filed at 10/17/2024 1036   History of cerebrovascular disease (Prior TIA or stroke) 0 filed at 10/17/2024 1036   Pre-operative insulin treatment 0 filed at 10/17/2024 1036   Pre-operative creatinine>2 mg/dl 0 filed at 10/17/2024 1036   Revised Cardiac Risk Calculator 1 filed at 10/17/2024 1036          Apfel Simplified Score      Flowsheet Row Pre-Admission Testing from 10/17/2024 in [facility]   Smoking status 0 filed at 10/17/2024 1036   History of motion sickness or PONV  0 filed at 10/17/2024 1036   Use of postoperative opioids 1 filed at 10/17/2024 1036   Gender - Female 1=Yes filed at 10/17/2024 1036   Apfel Simplified Score Calculator 2 filed at 10/17/2024 1036          Risk Analysis Index Results This  Encounter    No data found in the last 10 encounters.       Stop Bang Score      Flowsheet Row Pre-Admission Testing from 10/17/2024 in [facility]   Do you snore loudly? 0 filed at 10/17/2024 1017   Do you often feel tired or fatigued after your sleep? 0 filed at 10/17/2024 1017   Has anyone ever observed you stop breathing in your sleep? 0 filed at 10/17/2024 1017   Do you have or are you being treated for high blood pressure? 1 filed at 10/17/2024 1017   Recent BMI (Calculated) 25.7 filed at 10/17/2024 1017   Is BMI greater than 35 kg/m2? 0=No filed at 10/17/2024 1017   Age older than 50 years old? 1=Yes filed at 10/17/2024 1017   Is your neck circumference greater than 17 inches (Male) or 16 inches (Female)? 0 filed at 10/17/2024 1017   Gender - Male 0=No filed at 10/17/2024 1017   STOP-BANG Total Score 2 filed at 10/17/2024 1017          Recent Results (from the past week)   Coagulation Screen    Collection Time: 10/17/24 11:03 AM   Result Value Ref Range    Protime 11.7 9.8 - 12.8 seconds    INR 1.0 0.9 - 1.1    aPTT 31 27 - 38 seconds   Type And Screen    Collection Time: 10/17/24 11:03 AM   Result Value Ref Range    ABO TYPE O     Rh TYPE POS     ANTIBODY SCREEN NEG    Staphylococcus aureus/MRSA colonization, Culture    Collection Time: 10/17/24 11:03 AM    Specimen: Anterior Nares; Swab   Result Value Ref Range    Staph/MRSA Screen Culture No Staphylococcus aureus isolated    CBC and Auto Differential    Collection Time: 10/17/24 11:03 AM   Result Value Ref Range    WBC 4.2 (L) 4.4 - 11.3 x10*3/uL    nRBC 0.0 0.0 - 0.0 /100 WBCs    RBC 4.11 4.00 - 5.20 x10*6/uL    Hemoglobin 12.4 12.0 - 16.0 g/dL    Hematocrit 38.3 36.0 - 46.0 %    MCV 93 80 - 100 fL    MCH 30.2 26.0 - 34.0 pg    MCHC 32.4 32.0 - 36.0 g/dL    RDW 12.8 11.5 - 14.5 %    Platelets 296 150 - 450 x10*3/uL    Neutrophils % 56.3 40.0 - 80.0 %    Immature Granulocytes %, Automated 0.2 0.0 - 0.9 %    Lymphocytes % 28.4 13.0 - 44.0 %     Monocytes % 10.2 2.0 - 10.0 %    Eosinophils % 3.5 0.0 - 6.0 %    Basophils % 1.4 0.0 - 2.0 %    Neutrophils Absolute 2.38 1.20 - 7.70 x10*3/uL    Immature Granulocytes Absolute, Automated 0.01 0.00 - 0.70 x10*3/uL    Lymphocytes Absolute 1.20 1.20 - 4.80 x10*3/uL    Monocytes Absolute 0.43 0.10 - 1.00 x10*3/uL    Eosinophils Absolute 0.15 0.00 - 0.70 x10*3/uL    Basophils Absolute 0.06 0.00 - 0.10 x10*3/uL   Comprehensive Metabolic Panel    Collection Time: 10/17/24 11:03 AM   Result Value Ref Range    Glucose 93 74 - 99 mg/dL    Sodium 141 136 - 145 mmol/L    Potassium 4.3 3.5 - 5.3 mmol/L    Chloride 105 98 - 107 mmol/L    Bicarbonate 27 21 - 32 mmol/L    Anion Gap 13 10 - 20 mmol/L    Urea Nitrogen 18 6 - 23 mg/dL    Creatinine 0.74 0.50 - 1.05 mg/dL    eGFR 89 >60 mL/min/1.73m*2    Calcium 9.6 8.6 - 10.6 mg/dL    Albumin 4.6 3.4 - 5.0 g/dL    Alkaline Phosphatase 74 33 - 136 U/L    Total Protein 7.4 6.4 - 8.2 g/dL    AST 26 9 - 39 U/L    Bilirubin, Total 0.7 0.0 - 1.2 mg/dL    ALT 30 7 - 45 U/L        Diagnostic Results    -Cardiac Cath; 9/27/24  CONCLUSIONS:   1. Minimal luminal irregularities in a right dominant system.     -Transthoracic Echo; 5/31/24  CONCLUSIONS:   1. Left ventricular systolic function is low normal with a 50-55% estimated ejection fraction.   2. Spectral Doppler shows an impaired relaxation pattern of left ventricular diastolic filling.   3. The left atrium is mild to moderately dilated.   4. Mildly elevated RVSP.   5. Mild aortic valve stenosis.   6. Mild aortic valve regurgitation.   7. There is moderate dilatation of the ascending aorta.     - CT ANGIO CHEST W AND WO IV CONTRAST; 5/29/2024   IMPRESSION:  1. Ascending aortic fusiform aneurysm measuring 5.2 cm in its maximum  dimension at the level of the main pulmonary artery and returns to  normal at the level of the mid arch. Recommend follow-up radiation  sparing cardiac MRI and thoracic MRA to assess status of the aortic  valve  and size and extent of the aneurysm in approximately 12 months.  2. The sinotubular junction is effaced.  3. The aortic root is dilated.  4. The central pulmonary arteries appear dilated correlate with  elevated right-sided filling pressures.     - CT CARDIAC SCORING WO IV CONTRAST; 5/13/2024   IMPRESSION:  1. Coronary artery calcium score of 0*.  2. There is aneurysmal dilatation of the ascending aorta (5.2 cm).  Recommend dedicated thoracic CTA for definitive assessment. Critical  Finding:  See findings. Notification was initiated on 5/13/2024 at  9:20 am by  Suzette Olivares.  (**-OCF-**) Instructions:     -CT ANGIO CHEST W AND WO IV CONTRAST; 5/29/2024   IMPRESSION:  1. Ascending aortic fusiform aneurysm measuring 5.2 cm in its maximum  dimension at the level of the main pulmonary artery and returns to  normal at the level of the mid arch. Recommend follow-up radiation  sparing cardiac MRI and thoracic MRA to assess status of the aortic  valve and size and extent of the aneurysm in approximately 12 months.  2. The sinotubular junction is effaced.  3. The aortic root is dilated.  4. The central pulmonary arteries appear dilated correlate with  elevated right-sided filling pressures.       Assessment and Plan:     Anesthesia:  The patient denies problems with anesthesia in the past such as PONV, prolonged sedation, awareness, dental damage, aspiration, cardiac arrest, difficult intubation, or unexpected hospital admissions.     Cardiovascular  #HTN  -Given her prior history of receiving Herceptin, her elevated blood pressure, and dilated ascending aorta she was started on carvedilol 6.25mg po BID, instructed to take morning of surgery. BP today 148/82.  #HLD  -managed on atorvastatin, continue as prescribed in the perioperative period  #Dilated ascending aorta  -scheduled for surgery with Dr. Ramirez on 11/5/24.  She is scheduled for cardiac surgery.  Preoperative evaluation is complete pending results of labs, urine,  minh cxray.  Cardiology Evaluation  The patient follows with cardiology, Dr. Raul Muniz. Patient was last seen 9/25/24. Follow up in 3 months   METS  The patient's functional capacity capacity is greater than 4 METS.  RCRI  The patient meets 1 RCRI criteria and therefore has a 6% risk of major adverse cardiac complications.  LUIS ANTONIO score which indicates a 0.8% risk of intraoperative or 30-day postoperative MACE (major adverse cardiac event).    Pulmonary   No significant findings on chart review or clinical presentation and evaluation. The patient is at increased risk of perioperative pulmonary complications secondary to thoracic surgery, ongoing tobacco abuse, advanced age greater than 60. Smoking cessation discussed.    The patient has a stop bang score of 2, which places patient at low risk for having SAGAR.    ARISCAT 50, High, 42.1% risk of in-hospital postoperative pulmonary complications  PRODIGY 8, intermediate risk of respiratory depression episode. Patient given PI sheet for preoperative deep breathing exercises.    Neuro:   The patient has no neurological diagnoses or significant findings on chart review, clinical presentation, and evaluation. No grossly apparent neurological perioperative risk. The patient is at increased risk for postoperative delirium secondary to age 65 or older. The patient is at increased risk for perioperative stroke secondary to hypertension , increased age, hyperlipidemia, female gender, general anesthesia, operative time >2.5 hours, cardiac or emergency surgery. Handouts for preoperative brain exercises given to patient.    HEENT/Airway  #ARMD  #PVD  -follows with ophthalmology, Dr. Indra Torres  No documented or reported history of airway difficulty.     Endocrine  No diagnoses or significant findings on chart review or clinical presentation and evaluation.    Gastrointestinal  #GERD  -managed on dexilant and famotidine, continue as prescribed in the perioperative period. No  current GI complaints.  GI:     Eat 10- 12,  self-perceived oropharyngeal dysphagia scale (0-40)     Genitourinary  No diagnoses or significant findings on chart review or clinical presentation and evaluation.    Renal  No renal diagnoses or significant findings on chart review or clinical presentation and evaluation. The patient has specific risk factors associated with increased risk of perioperative renal complications related to age greater than 55, hypertension. Preventative measures include preoperative hydration.    Hematology/Oncology  #Breast cancer  -diagnosed with right-sided breast cancer in 2010 and underwent lumpectomy with radiation and chemotherapy that included Herceptin. She then had left-sided breast cancer diagnosed in 2014 and underwent a lumpectomy, chemotherapy, and radiation therapy. Currently on Aromasin, continue as prescribed in the perioperative period    #DVT  -left upper extremity DVT in approximately 2019 that was incidentally discovered during a thyroid ultrasound. She was treated with Eliquis for 6 months. No recurrence.    Caprini score 12, high risk of perioperative VTE.     Patient instructed to ambulate as soon as possible postoperatively to decrease thromboembolic risk. Initiate mechanical DVT prophylaxis as soon as possible and initiate chemical prophylaxis when deemed safe from a bleeding standpoint post surgery.     Transfusion Evaluation  A type and screen was obtained given the likelihood for perioperative transfusion of blood or blood products.    Musculoskeletal  No diagnoses or significant findings on chart review or clinical presentation and evaluation.    ID  General Surgery: YOSVANY Baird-CNP LOV 8/13/24 seen for right breast mastitis    MRSA screening obtained. Prescriptions and instructions given for Hibiclens and Peridex.    -Preoperative medication instructions were provided and reviewed with the patient.  Any additional testing or evaluation was explained  to the patient.  NPO Instructions were discussed, and the patient's questions were answered prior to conclusion of this encounter. Patient verbalized understanding of preoperative instructions. After Visit Summary given.

## 2024-10-19 LAB — STAPHYLOCOCCUS SPEC CULT: NORMAL

## 2024-11-04 NOTE — PROGRESS NOTES
Pharmacy Medication History Review    Kiley Wrad is a 69 y.o. female who is planned to be admitted for Aneurysm of ascending aorta without rupture (CMS-[facility]). Pharmacy called the patient prior to their scheduled procedure and reviewed the patient's fxrrn-do-sqnaxvnbo medications for accuracy.    Medications ADDED:  none  Medications CHANGED:  Ergocaliciferol 50,000U - added directions  Medications REMOVED:   Aspirin 81mg  Exemestane 25mg    Please review updated prior to admission medication list and comments regarding how patient may be taking medications differently by going to Admission tab --> Admission Orders --> Admit Orders / Review prior to admission medications.     Preferred pharmacy, last doses of medications, and allergies to be confirmed with patient by nursing the day of procedure.     Sources used to complete the med history include spoke with patient, medication dispense report, oarrs, care everywhere    Below are additional concerns with the patient's PTA list.  Patient confirmed they stopped taking aspirin 81mg and exemestane 25mg (marked not taking @ [facility] 10-17-24) so deleted those medications from patient's active medication list  Patient stated they take their vitamin d every other week. (L.F. 10/04/24 #6/84d)    Wendy Waterman    Meds Ambulatory and Retail Services  Please reach out via Secure Chat for questions

## 2024-11-05 ENCOUNTER — ANESTHESIA (OUTPATIENT)
Dept: OPERATING ROOM | Facility: HOSPITAL | Age: 69
End: 2024-11-05
Payer: MEDICARE

## 2024-11-05 ENCOUNTER — HOSPITAL ENCOUNTER (INPATIENT)
Dept: OPERATING ROOM | Facility: HOSPITAL | Age: 69
Discharge: HOME | DRG: 220 | End: 2024-11-05
Payer: MEDICARE

## 2024-11-05 ENCOUNTER — HOSPITAL ENCOUNTER (INPATIENT)
Facility: HOSPITAL | Age: 69
LOS: 5 days | Discharge: HOME | End: 2024-11-10
Attending: THORACIC SURGERY (CARDIOTHORACIC VASCULAR SURGERY) | Admitting: THORACIC SURGERY (CARDIOTHORACIC VASCULAR SURGERY)
Payer: MEDICARE

## 2024-11-05 ENCOUNTER — ANESTHESIA EVENT (OUTPATIENT)
Dept: OPERATING ROOM | Facility: HOSPITAL | Age: 69
End: 2024-11-05
Payer: MEDICARE

## 2024-11-05 ENCOUNTER — APPOINTMENT (OUTPATIENT)
Dept: RADIOLOGY | Facility: HOSPITAL | Age: 69
DRG: 220 | End: 2024-11-05
Payer: MEDICARE

## 2024-11-05 ENCOUNTER — APPOINTMENT (OUTPATIENT)
Dept: CARDIOLOGY | Facility: HOSPITAL | Age: 69
DRG: 220 | End: 2024-11-05
Payer: MEDICARE

## 2024-11-05 DIAGNOSIS — Z95.2 S/P AVR (AORTIC VALVE REPLACEMENT) AND AORTOPLASTY: ICD-10-CM

## 2024-11-05 DIAGNOSIS — Z86.79 S/P AORTIC ANEURYSM REPAIR: ICD-10-CM

## 2024-11-05 DIAGNOSIS — I35.1 NONRHEUMATIC AORTIC (VALVE) INSUFFICIENCY: ICD-10-CM

## 2024-11-05 DIAGNOSIS — I71.21 ASCENDING AORTIC ANEURYSM, UNSPECIFIED WHETHER RUPTURED (CMS-HCC): Primary | ICD-10-CM

## 2024-11-05 DIAGNOSIS — I71.21 ANEURYSM OF ASCENDING AORTA WITHOUT RUPTURE (CMS-HCC): ICD-10-CM

## 2024-11-05 DIAGNOSIS — Z98.890 S/P AORTIC ANEURYSM REPAIR: ICD-10-CM

## 2024-11-05 PROBLEM — E78.5 HYPERLIPIDEMIA: Status: ACTIVE | Noted: 2024-11-05

## 2024-11-05 PROBLEM — I10 HTN (HYPERTENSION): Status: ACTIVE | Noted: 2024-11-05

## 2024-11-05 PROBLEM — I82.629 ACUTE DEEP VEIN THROMBOSIS (DVT) OF UPPER EXTREMITY (MULTI): Status: ACTIVE | Noted: 2024-11-05

## 2024-11-05 PROBLEM — H40.9 GLAUCOMA: Status: ACTIVE | Noted: 2024-11-05

## 2024-11-05 LAB
ABO GROUP (TYPE) IN BLOOD: NORMAL
ABO GROUP (TYPE) IN BLOOD: NORMAL
ACT BLD: 437 SEC (ref 82–174)
ACT BLD: 438 SEC (ref 82–174)
ACT BLD: 457 SEC (ref 82–174)
ACT BLD: 486 SEC (ref 82–174)
ACT BLD: 98 SEC (ref 82–174)
ACT BLD: 98 SEC (ref 82–174)
ALBUMIN SERPL BCP-MCNC: 3.3 G/DL (ref 3.4–5)
ANION GAP BLDA CALCULATED.4IONS-SCNC: 10 MMO/L (ref 10–25)
ANION GAP BLDA CALCULATED.4IONS-SCNC: 13 MMO/L (ref 10–25)
ANION GAP BLDA CALCULATED.4IONS-SCNC: 6 MMO/L (ref 10–25)
ANION GAP BLDA CALCULATED.4IONS-SCNC: 7 MMO/L (ref 10–25)
ANION GAP BLDA CALCULATED.4IONS-SCNC: 9 MMO/L (ref 10–25)
ANION GAP BLDA CALCULATED.4IONS-SCNC: 9 MMO/L (ref 10–25)
ANION GAP SERPL CALC-SCNC: 11 MMOL/L (ref 10–20)
ANTIBODY SCREEN: NORMAL
APTT PPP: 25 SECONDS (ref 27–38)
BASE EXCESS BLDA CALC-SCNC: -0.4 MMOL/L (ref -2–3)
BASE EXCESS BLDA CALC-SCNC: -1.8 MMOL/L (ref -2–3)
BASE EXCESS BLDA CALC-SCNC: -2.2 MMOL/L (ref -2–3)
BASE EXCESS BLDA CALC-SCNC: -2.2 MMOL/L (ref -2–3)
BASE EXCESS BLDA CALC-SCNC: -2.5 MMOL/L (ref -2–3)
BASE EXCESS BLDA CALC-SCNC: -2.7 MMOL/L (ref -2–3)
BASE EXCESS BLDA CALC-SCNC: -2.8 MMOL/L (ref -2–3)
BASE EXCESS BLDA CALC-SCNC: 0.1 MMOL/L (ref -2–3)
BASE EXCESS BLDA CALC-SCNC: 0.7 MMOL/L (ref -2–3)
BASE EXCESS BLDA CALC-SCNC: 1.6 MMOL/L (ref -2–3)
BASE EXCESS BLDA CALC-SCNC: 1.7 MMOL/L (ref -2–3)
BASE EXCESS BLDA CALC-SCNC: 2.2 MMOL/L (ref -2–3)
BASE EXCESS BLDV CALC-SCNC: 2 MMOL/L (ref -2–3)
BODY TEMPERATURE: 37 DEGREES CELSIUS
BUN SERPL-MCNC: 14 MG/DL (ref 6–23)
CA-I BLD-SCNC: 1.08 MMOL/L (ref 1.1–1.33)
CA-I BLDA-SCNC: 0.94 MMOL/L (ref 1.1–1.33)
CA-I BLDA-SCNC: 1.03 MMOL/L (ref 1.1–1.33)
CA-I BLDA-SCNC: 1.03 MMOL/L (ref 1.1–1.33)
CA-I BLDA-SCNC: 1.05 MMOL/L (ref 1.1–1.33)
CA-I BLDA-SCNC: 1.07 MMOL/L (ref 1.1–1.33)
CA-I BLDA-SCNC: 1.11 MMOL/L (ref 1.1–1.33)
CA-I BLDA-SCNC: 1.12 MMOL/L (ref 1.1–1.33)
CA-I BLDA-SCNC: 1.12 MMOL/L (ref 1.1–1.33)
CA-I BLDA-SCNC: 1.16 MMOL/L (ref 1.1–1.33)
CA-I BLDA-SCNC: 1.16 MMOL/L (ref 1.1–1.33)
CA-I BLDA-SCNC: 1.21 MMOL/L (ref 1.1–1.33)
CA-I BLDA-SCNC: 1.22 MMOL/L (ref 1.1–1.33)
CALCIUM SERPL-MCNC: 7.8 MG/DL (ref 8.6–10.6)
CFT FORM KAOLIN IND BLD RES TEG: 1 MIN (ref 0.8–2.1)
CFT FORM KAOLIN IND BLD RES TEG: 1.4 MIN (ref 0.8–2.1)
CHLORIDE BLDA-SCNC: 107 MMOL/L (ref 98–107)
CHLORIDE BLDA-SCNC: 108 MMOL/L (ref 98–107)
CHLORIDE BLDA-SCNC: 109 MMOL/L (ref 98–107)
CHLORIDE BLDA-SCNC: 109 MMOL/L (ref 98–107)
CHLORIDE BLDA-SCNC: 110 MMOL/L (ref 98–107)
CHLORIDE BLDA-SCNC: 111 MMOL/L (ref 98–107)
CHLORIDE SERPL-SCNC: 110 MMOL/L (ref 98–107)
CLOT ANGLE.KAOLIN INDUCED BLD RES TEG: 72 DEG (ref 63–78)
CLOT ANGLE.KAOLIN INDUCED BLD RES TEG: 76 DEG (ref 63–78)
CLOT INIT KAO IND P HEP NEUT BLD RES TEG: 3.2 MIN (ref 4.3–8.3)
CLOT INIT KAO IND P HEP NEUT BLD RES TEG: 3.2 MIN (ref 4.6–9.1)
CLOT INIT KAO IND P HEP NEUT BLD RES TEG: 6.7 MIN (ref 4.6–9.1)
CLOT INIT KAO IND P HEP NEUT BLD RES TEG: 6.8 MIN (ref 4.3–8.3)
CO2 SERPL-SCNC: 25 MMOL/L (ref 21–32)
CREAT SERPL-MCNC: 0.73 MG/DL (ref 0.5–1.05)
EGFRCR SERPLBLD CKD-EPI 2021: 90 ML/MIN/1.73M*2
EJECTION FRACTION: 53 %
ERYTHROCYTE [DISTWIDTH] IN BLOOD BY AUTOMATED COUNT: 12.7 % (ref 11.5–14.5)
FIBRINOGEN BLD CALC-MCNC: 301 MG/DL (ref 278–581)
FIBRINOGEN BLD CALC-MCNC: 378 MG/DL (ref 278–581)
FIBRINOGEN PPP-MCNC: 183 MG/DL (ref 200–400)
GLUCOSE BLDA-MCNC: 107 MG/DL (ref 74–99)
GLUCOSE BLDA-MCNC: 127 MG/DL (ref 74–99)
GLUCOSE BLDA-MCNC: 130 MG/DL (ref 74–99)
GLUCOSE BLDA-MCNC: 151 MG/DL (ref 74–99)
GLUCOSE BLDA-MCNC: 153 MG/DL (ref 74–99)
GLUCOSE BLDA-MCNC: 156 MG/DL (ref 74–99)
GLUCOSE BLDA-MCNC: 169 MG/DL (ref 74–99)
GLUCOSE BLDA-MCNC: 181 MG/DL (ref 74–99)
GLUCOSE BLDA-MCNC: 186 MG/DL (ref 74–99)
GLUCOSE BLDA-MCNC: 193 MG/DL (ref 74–99)
GLUCOSE SERPL-MCNC: 164 MG/DL (ref 74–99)
HCO3 BLDA-SCNC: 21.9 MMOL/L (ref 22–26)
HCO3 BLDA-SCNC: 22.4 MMOL/L (ref 22–26)
HCO3 BLDA-SCNC: 22.4 MMOL/L (ref 22–26)
HCO3 BLDA-SCNC: 22.5 MMOL/L (ref 22–26)
HCO3 BLDA-SCNC: 23.1 MMOL/L (ref 22–26)
HCO3 BLDA-SCNC: 24.1 MMOL/L (ref 22–26)
HCO3 BLDA-SCNC: 24.4 MMOL/L (ref 22–26)
HCO3 BLDA-SCNC: 24.7 MMOL/L (ref 22–26)
HCO3 BLDA-SCNC: 25.4 MMOL/L (ref 22–26)
HCO3 BLDA-SCNC: 25.8 MMOL/L (ref 22–26)
HCO3 BLDA-SCNC: 26.5 MMOL/L (ref 22–26)
HCO3 BLDA-SCNC: 26.6 MMOL/L (ref 22–26)
HCO3 BLDV-SCNC: 27.3 MMOL/L (ref 22–26)
HCT VFR BLD AUTO: 33.6 % (ref 36–46)
HCT VFR BLD EST: 25 % (ref 36–46)
HCT VFR BLD EST: 26 % (ref 36–46)
HCT VFR BLD EST: 29 % (ref 36–46)
HCT VFR BLD EST: 32 % (ref 36–46)
HCT VFR BLD EST: 32 % (ref 36–46)
HCT VFR BLD EST: 33 % (ref 36–46)
HCT VFR BLD EST: 33 % (ref 36–46)
HCT VFR BLD EST: 36 % (ref 36–46)
HCT VFR BLD EST: 37 % (ref 36–46)
HCT VFR BLD EST: 38 % (ref 36–46)
HGB BLD-MCNC: 11.8 G/DL (ref 12–16)
HGB BLDA-MCNC: 10.5 G/DL (ref 12–16)
HGB BLDA-MCNC: 10.8 G/DL (ref 12–16)
HGB BLDA-MCNC: 10.9 G/DL (ref 12–16)
HGB BLDA-MCNC: 11.1 G/DL (ref 12–16)
HGB BLDA-MCNC: 12 G/DL (ref 12–16)
HGB BLDA-MCNC: 12.2 G/DL (ref 12–16)
HGB BLDA-MCNC: 12.2 G/DL (ref 12–16)
HGB BLDA-MCNC: 12.4 G/DL (ref 12–16)
HGB BLDA-MCNC: 12.5 G/DL (ref 12–16)
HGB BLDA-MCNC: 8.3 G/DL (ref 12–16)
HGB BLDA-MCNC: 8.8 G/DL (ref 12–16)
HGB BLDA-MCNC: 9.5 G/DL (ref 12–16)
INHALED O2 CONCENTRATION: 31 %
INHALED O2 CONCENTRATION: 36 %
INHALED O2 CONCENTRATION: 40 %
INHALED O2 CONCENTRATION: 50 %
INHALED O2 CONCENTRATION: 60 %
INHALED O2 CONCENTRATION: 70 %
INHALED O2 CONCENTRATION: 70 %
INHALED O2 CONCENTRATION: 80 %
INHALED O2 CONCENTRATION: 80 %
INR PPP: 1.2 (ref 0.9–1.1)
LACTATE BLDA-SCNC: 0.5 MMOL/L (ref 0.4–2)
LACTATE BLDA-SCNC: 0.5 MMOL/L (ref 0.4–2)
LACTATE BLDA-SCNC: 0.6 MMOL/L (ref 0.4–2)
LACTATE BLDA-SCNC: 0.7 MMOL/L (ref 0.4–2)
LACTATE BLDA-SCNC: 0.8 MMOL/L (ref 0.4–2)
LACTATE BLDA-SCNC: 0.9 MMOL/L (ref 0.4–2)
LACTATE BLDA-SCNC: 1.6 MMOL/L (ref 0.4–2)
MA KAOLIN BLD RES TEG: 55 MM (ref 52–69)
MA KAOLIN BLD RES TEG: 62 MM (ref 52–69)
MA KAOLIN+TF BLD RES TEG: 52 MM (ref 52–70)
MA KAOLIN+TF BLD RES TEG: 63 MM (ref 52–70)
MA TF IND+IIB-IIIA INH BLD RES TEG: 16 MM (ref 15–32)
MA TF IND+IIB-IIIA INH BLD RES TEG: 21 MM (ref 15–32)
MAGNESIUM SERPL-MCNC: 2.97 MG/DL (ref 1.6–2.4)
MCH RBC QN AUTO: 31 PG (ref 26–34)
MCHC RBC AUTO-ENTMCNC: 35.1 G/DL (ref 32–36)
MCV RBC AUTO: 88 FL (ref 80–100)
NRBC BLD-RTO: 0 /100 WBCS (ref 0–0)
OXYHGB MFR BLDA: 97.3 % (ref 94–98)
OXYHGB MFR BLDA: 97.3 % (ref 94–98)
OXYHGB MFR BLDA: 97.4 % (ref 94–98)
OXYHGB MFR BLDA: 97.5 % (ref 94–98)
OXYHGB MFR BLDA: 97.7 % (ref 94–98)
OXYHGB MFR BLDA: 97.7 % (ref 94–98)
OXYHGB MFR BLDA: 98 % (ref 94–98)
OXYHGB MFR BLDA: 98.1 % (ref 94–98)
OXYHGB MFR BLDA: 98.2 % (ref 94–98)
OXYHGB MFR BLDA: 98.3 % (ref 94–98)
OXYHGB MFR BLDV: 77.1 % (ref 45–75)
PCO2 BLDA: 37 MM HG (ref 38–42)
PCO2 BLDA: 38 MM HG (ref 38–42)
PCO2 BLDA: 38 MM HG (ref 38–42)
PCO2 BLDA: 39 MM HG (ref 38–42)
PCO2 BLDA: 40 MM HG (ref 38–42)
PCO2 BLDA: 42 MM HG (ref 38–42)
PCO2 BLDA: 52 MM HG (ref 38–42)
PCO2 BLDV: 44 MM HG (ref 41–51)
PH BLDA: 7.28 PH (ref 7.38–7.42)
PH BLDA: 7.37 PH (ref 7.38–7.42)
PH BLDA: 7.38 PH (ref 7.38–7.42)
PH BLDA: 7.38 PH (ref 7.38–7.42)
PH BLDA: 7.39 PH (ref 7.38–7.42)
PH BLDA: 7.39 PH (ref 7.38–7.42)
PH BLDA: 7.41 PH (ref 7.38–7.42)
PH BLDA: 7.44 PH (ref 7.38–7.42)
PH BLDA: 7.44 PH (ref 7.38–7.42)
PH BLDV: 7.4 PH (ref 7.33–7.43)
PHOSPHATE SERPL-MCNC: 2.6 MG/DL (ref 2.5–4.9)
PLATELET # BLD AUTO: 163 X10*3/UL (ref 150–450)
PO2 BLDA: 122 MM HG (ref 85–95)
PO2 BLDA: 123 MM HG (ref 85–95)
PO2 BLDA: 123 MM HG (ref 85–95)
PO2 BLDA: 134 MM HG (ref 85–95)
PO2 BLDA: 137 MM HG (ref 85–95)
PO2 BLDA: 145 MM HG (ref 85–95)
PO2 BLDA: 153 MM HG (ref 85–95)
PO2 BLDA: 174 MM HG (ref 85–95)
PO2 BLDA: 239 MM HG (ref 85–95)
PO2 BLDA: 241 MM HG (ref 85–95)
PO2 BLDA: 282 MM HG (ref 85–95)
PO2 BLDA: 355 MM HG (ref 85–95)
PO2 BLDV: 44 MM HG (ref 35–45)
POTASSIUM BLDA-SCNC: 3.6 MMOL/L (ref 3.5–5.3)
POTASSIUM BLDA-SCNC: 3.7 MMOL/L (ref 3.5–5.3)
POTASSIUM BLDA-SCNC: 3.8 MMOL/L (ref 3.5–5.3)
POTASSIUM BLDA-SCNC: 3.9 MMOL/L (ref 3.5–5.3)
POTASSIUM BLDA-SCNC: 4 MMOL/L (ref 3.5–5.3)
POTASSIUM BLDA-SCNC: 4.3 MMOL/L (ref 3.5–5.3)
POTASSIUM BLDA-SCNC: 4.4 MMOL/L (ref 3.5–5.3)
POTASSIUM BLDA-SCNC: 4.4 MMOL/L (ref 3.5–5.3)
POTASSIUM BLDA-SCNC: 5.1 MMOL/L (ref 3.5–5.3)
POTASSIUM BLDA-SCNC: 6.1 MMOL/L (ref 3.5–5.3)
POTASSIUM SERPL-SCNC: 4.3 MMOL/L (ref 3.5–5.3)
PROTHROMBIN TIME: 13.3 SECONDS (ref 9.8–12.8)
RBC # BLD AUTO: 3.81 X10*6/UL (ref 4–5.2)
RH FACTOR (ANTIGEN D): NORMAL
RH FACTOR (ANTIGEN D): NORMAL
SAO2 % BLDA: 100 % (ref 94–100)
SAO2 % BLDA: 99 % (ref 94–100)
SAO2 % BLDV: 79 % (ref 45–75)
SODIUM BLDA-SCNC: 135 MMOL/L (ref 136–145)
SODIUM BLDA-SCNC: 136 MMOL/L (ref 136–145)
SODIUM BLDA-SCNC: 137 MMOL/L (ref 136–145)
SODIUM BLDA-SCNC: 138 MMOL/L (ref 136–145)
SODIUM BLDA-SCNC: 138 MMOL/L (ref 136–145)
SODIUM BLDA-SCNC: 139 MMOL/L (ref 136–145)
SODIUM BLDA-SCNC: 139 MMOL/L (ref 136–145)
SODIUM BLDA-SCNC: 141 MMOL/L (ref 136–145)
SODIUM SERPL-SCNC: 142 MMOL/L (ref 136–145)
TEST COMMENT: ABNORMAL
TEST COMMENT: NORMAL
VENTILATOR MODE: ABNORMAL
WBC # BLD AUTO: 12.4 X10*3/UL (ref 4.4–11.3)

## 2024-11-05 PROCEDURE — 2500000005 HC RX 250 GENERAL PHARMACY W/O HCPCS: Performed by: NURSE PRACTITIONER

## 2024-11-05 PROCEDURE — 99291 CRITICAL CARE FIRST HOUR: CPT | Performed by: STUDENT IN AN ORGANIZED HEALTH CARE EDUCATION/TRAINING PROGRAM

## 2024-11-05 PROCEDURE — 2500000004 HC RX 250 GENERAL PHARMACY W/ HCPCS (ALT 636 FOR OP/ED): Mod: JZ | Performed by: THORACIC SURGERY (CARDIOTHORACIC VASCULAR SURGERY)

## 2024-11-05 PROCEDURE — 3600000010 HC OR TIME - EACH INCREMENTAL 1 MINUTE - PROCEDURE LEVEL FIVE: Performed by: THORACIC SURGERY (CARDIOTHORACIC VASCULAR SURGERY)

## 2024-11-05 PROCEDURE — 82947 ASSAY GLUCOSE BLOOD QUANT: CPT | Performed by: NURSE PRACTITIONER

## 2024-11-05 PROCEDURE — 84520 ASSAY OF UREA NITROGEN: CPT | Performed by: NURSE PRACTITIONER

## 2024-11-05 PROCEDURE — 83735 ASSAY OF MAGNESIUM: CPT | Performed by: NURSE PRACTITIONER

## 2024-11-05 PROCEDURE — 88305 TISSUE EXAM BY PATHOLOGIST: CPT | Mod: TC,SUR | Performed by: THORACIC SURGERY (CARDIOTHORACIC VASCULAR SURGERY)

## 2024-11-05 PROCEDURE — 2500000004 HC RX 250 GENERAL PHARMACY W/ HCPCS (ALT 636 FOR OP/ED)

## 2024-11-05 PROCEDURE — P9047 ALBUMIN (HUMAN), 25%, 50ML: HCPCS | Mod: JZ | Performed by: THORACIC SURGERY (CARDIOTHORACIC VASCULAR SURGERY)

## 2024-11-05 PROCEDURE — 33863 ASCENDING AORTIC GRAFT: CPT | Performed by: THORACIC SURGERY (CARDIOTHORACIC VASCULAR SURGERY)

## 2024-11-05 PROCEDURE — 2500000005 HC RX 250 GENERAL PHARMACY W/O HCPCS: Performed by: THORACIC SURGERY (CARDIOTHORACIC VASCULAR SURGERY)

## 2024-11-05 PROCEDURE — 2020000001 HC ICU ROOM DAILY

## 2024-11-05 PROCEDURE — 5A1221Z PERFORMANCE OF CARDIAC OUTPUT, CONTINUOUS: ICD-10-PCS | Performed by: THORACIC SURGERY (CARDIOTHORACIC VASCULAR SURGERY)

## 2024-11-05 PROCEDURE — A4312 CATH W/O BAG 2-WAY SILICONE: HCPCS | Performed by: THORACIC SURGERY (CARDIOTHORACIC VASCULAR SURGERY)

## 2024-11-05 PROCEDURE — XXE2X19 MEASUREMENT OF CARDIAC OUTPUT, COMPUTER-AIDED ASSESSMENT, NEW TECHNOLOGY GROUP 9: ICD-10-PCS | Performed by: THORACIC SURGERY (CARDIOTHORACIC VASCULAR SURGERY)

## 2024-11-05 PROCEDURE — 36415 COLL VENOUS BLD VENIPUNCTURE: CPT | Performed by: STUDENT IN AN ORGANIZED HEALTH CARE EDUCATION/TRAINING PROGRAM

## 2024-11-05 PROCEDURE — 02RX0JZ REPLACEMENT OF THORACIC AORTA, ASCENDING/ARCH WITH SYNTHETIC SUBSTITUTE, OPEN APPROACH: ICD-10-PCS | Performed by: THORACIC SURGERY (CARDIOTHORACIC VASCULAR SURGERY)

## 2024-11-05 PROCEDURE — 2500000001 HC RX 250 WO HCPCS SELF ADMINISTERED DRUGS (ALT 637 FOR MEDICARE OP): Performed by: NURSE PRACTITIONER

## 2024-11-05 PROCEDURE — 85384 FIBRINOGEN ACTIVITY: CPT

## 2024-11-05 PROCEDURE — 99291 CRITICAL CARE FIRST HOUR: CPT | Performed by: NURSE PRACTITIONER

## 2024-11-05 PROCEDURE — 3600000011 HC PERFUSION TIME - INITIAL BASE CHARGE: Performed by: THORACIC SURGERY (CARDIOTHORACIC VASCULAR SURGERY)

## 2024-11-05 PROCEDURE — 85027 COMPLETE CBC AUTOMATED: CPT | Performed by: NURSE PRACTITIONER

## 2024-11-05 PROCEDURE — A4649 SURGICAL SUPPLIES: HCPCS | Performed by: THORACIC SURGERY (CARDIOTHORACIC VASCULAR SURGERY)

## 2024-11-05 PROCEDURE — C1763 CONN TISS, NON-HUMAN: HCPCS | Performed by: THORACIC SURGERY (CARDIOTHORACIC VASCULAR SURGERY)

## 2024-11-05 PROCEDURE — 02RF08Z REPLACEMENT OF AORTIC VALVE WITH ZOOPLASTIC TISSUE, OPEN APPROACH: ICD-10-PCS | Performed by: THORACIC SURGERY (CARDIOTHORACIC VASCULAR SURGERY)

## 2024-11-05 PROCEDURE — C1889 IMPLANT/INSERT DEVICE, NOC: HCPCS | Performed by: THORACIC SURGERY (CARDIOTHORACIC VASCULAR SURGERY)

## 2024-11-05 PROCEDURE — 86901 BLOOD TYPING SEROLOGIC RH(D): CPT | Performed by: NURSE PRACTITIONER

## 2024-11-05 PROCEDURE — 85384 FIBRINOGEN ACTIVITY: CPT | Performed by: NURSE PRACTITIONER

## 2024-11-05 PROCEDURE — 2500000002 HC RX 250 W HCPCS SELF ADMINISTERED DRUGS (ALT 637 FOR MEDICARE OP, ALT 636 FOR OP/ED)

## 2024-11-05 PROCEDURE — 2500000005 HC RX 250 GENERAL PHARMACY W/O HCPCS

## 2024-11-05 PROCEDURE — 3600000018 HC OR TIME - INITIAL BASE CHARGE - PROCEDURE LEVEL SIX: Performed by: THORACIC SURGERY (CARDIOTHORACIC VASCULAR SURGERY)

## 2024-11-05 PROCEDURE — 2500000004 HC RX 250 GENERAL PHARMACY W/ HCPCS (ALT 636 FOR OP/ED): Performed by: EMERGENCY MEDICINE

## 2024-11-05 PROCEDURE — 3600000017 HC OR TIME - EACH INCREMENTAL 1 MINUTE - PROCEDURE LEVEL SIX: Performed by: THORACIC SURGERY (CARDIOTHORACIC VASCULAR SURGERY)

## 2024-11-05 PROCEDURE — 3600000005 HC OR TIME - INITIAL BASE CHARGE - PROCEDURE LEVEL FIVE: Performed by: THORACIC SURGERY (CARDIOTHORACIC VASCULAR SURGERY)

## 2024-11-05 PROCEDURE — 2720000007 HC OR 272 NO HCPCS: Performed by: THORACIC SURGERY (CARDIOTHORACIC VASCULAR SURGERY)

## 2024-11-05 PROCEDURE — 2500000004 HC RX 250 GENERAL PHARMACY W/ HCPCS (ALT 636 FOR OP/ED): Mod: JZ | Performed by: NURSE PRACTITIONER

## 2024-11-05 PROCEDURE — 2500000004 HC RX 250 GENERAL PHARMACY W/ HCPCS (ALT 636 FOR OP/ED): Performed by: THORACIC SURGERY (CARDIOTHORACIC VASCULAR SURGERY)

## 2024-11-05 PROCEDURE — 85018 HEMOGLOBIN: CPT

## 2024-11-05 PROCEDURE — 37799 UNLISTED PX VASCULAR SURGERY: CPT | Performed by: NURSE PRACTITIONER

## 2024-11-05 PROCEDURE — 3700000002 HC GENERAL ANESTHESIA TIME - EACH INCREMENTAL 1 MINUTE: Performed by: THORACIC SURGERY (CARDIOTHORACIC VASCULAR SURGERY)

## 2024-11-05 PROCEDURE — 71045 X-RAY EXAM CHEST 1 VIEW: CPT

## 2024-11-05 PROCEDURE — 71045 X-RAY EXAM CHEST 1 VIEW: CPT | Performed by: RADIOLOGY

## 2024-11-05 PROCEDURE — 85730 THROMBOPLASTIN TIME PARTIAL: CPT | Performed by: NURSE PRACTITIONER

## 2024-11-05 PROCEDURE — 82805 BLOOD GASES W/O2 SATURATION: CPT

## 2024-11-05 PROCEDURE — P9045 ALBUMIN (HUMAN), 5%, 250 ML: HCPCS | Mod: JZ | Performed by: NURSE PRACTITIONER

## 2024-11-05 PROCEDURE — C1768 GRAFT, VASCULAR: HCPCS | Performed by: THORACIC SURGERY (CARDIOTHORACIC VASCULAR SURGERY)

## 2024-11-05 PROCEDURE — 93005 ELECTROCARDIOGRAM TRACING: CPT

## 2024-11-05 PROCEDURE — B24BZZ4 ULTRASONOGRAPHY OF HEART WITH AORTA, TRANSESOPHAGEAL: ICD-10-PCS | Performed by: THORACIC SURGERY (CARDIOTHORACIC VASCULAR SURGERY)

## 2024-11-05 PROCEDURE — 82330 ASSAY OF CALCIUM: CPT | Performed by: NURSE PRACTITIONER

## 2024-11-05 PROCEDURE — 85347 COAGULATION TIME ACTIVATED: CPT

## 2024-11-05 PROCEDURE — 2780000003 HC OR 278 NO HCPCS: Performed by: THORACIC SURGERY (CARDIOTHORACIC VASCULAR SURGERY)

## 2024-11-05 PROCEDURE — 3600000012 HC PERFUSION TIME - EACH INCREMENTAL 1 MINUTE: Performed by: THORACIC SURGERY (CARDIOTHORACIC VASCULAR SURGERY)

## 2024-11-05 PROCEDURE — 3700000001 HC GENERAL ANESTHESIA TIME - INITIAL BASE CHARGE: Performed by: THORACIC SURGERY (CARDIOTHORACIC VASCULAR SURGERY)

## 2024-11-05 PROCEDURE — 88305 TISSUE EXAM BY PATHOLOGIST: CPT | Performed by: STUDENT IN AN ORGANIZED HEALTH CARE EDUCATION/TRAINING PROGRAM

## 2024-11-05 DEVICE — AVALUSTM ULTRA 400U23 23MM VALVE SIZE
Type: IMPLANTABLE DEVICE | Site: HEART | Status: FUNCTIONAL
Brand: AVALUS ULTRA™

## 2024-11-05 DEVICE — GELWEAVE GELATIN IMPREGNATED WOVEN VASCULAR PROSTHESIS STRAIGHT
Type: IMPLANTABLE DEVICE | Site: HEART | Status: FUNCTIONAL
Brand: GELWEAVE™

## 2024-11-05 DEVICE — BARD® PTFE FELT, 15.2 CM X 15.2 CM
Type: IMPLANTABLE DEVICE | Site: HEART | Status: FUNCTIONAL
Brand: BARD® PTFE FELT

## 2024-11-05 RX ORDER — OXYCODONE HYDROCHLORIDE 5 MG/1
5 TABLET ORAL EVERY 4 HOURS PRN
Status: DISCONTINUED | OUTPATIENT
Start: 2024-11-05 | End: 2024-11-07

## 2024-11-05 RX ORDER — SODIUM CHLORIDE 0.9 G/100ML
INJECTION, SOLUTION IRRIGATION AS NEEDED
Status: DISCONTINUED | OUTPATIENT
Start: 2024-11-05 | End: 2024-11-05 | Stop reason: HOSPADM

## 2024-11-05 RX ORDER — LIDOCAINE HYDROCHLORIDE 20 MG/ML
INJECTION, SOLUTION INFILTRATION; PERINEURAL AS NEEDED
Status: DISCONTINUED | OUTPATIENT
Start: 2024-11-05 | End: 2024-11-05

## 2024-11-05 RX ORDER — DEXMEDETOMIDINE HYDROCHLORIDE 4 UG/ML
INJECTION, SOLUTION INTRAVENOUS CONTINUOUS PRN
Status: DISCONTINUED | OUTPATIENT
Start: 2024-11-05 | End: 2024-11-05

## 2024-11-05 RX ORDER — PHENYLEPHRINE HYDROCHLORIDE 10 MG/ML
INJECTION INTRAVENOUS AS NEEDED
Status: DISCONTINUED | OUTPATIENT
Start: 2024-11-05 | End: 2024-11-05

## 2024-11-05 RX ORDER — ALBUMIN HUMAN 50 G/1000ML
25 SOLUTION INTRAVENOUS ONCE
Status: COMPLETED | OUTPATIENT
Start: 2024-11-05 | End: 2024-11-05

## 2024-11-05 RX ORDER — FENTANYL CITRATE 50 UG/ML
INJECTION, SOLUTION INTRAMUSCULAR; INTRAVENOUS AS NEEDED
Status: DISCONTINUED | OUTPATIENT
Start: 2024-11-05 | End: 2024-11-05

## 2024-11-05 RX ORDER — SODIUM CHLORIDE, SODIUM LACTATE, POTASSIUM CHLORIDE, CALCIUM CHLORIDE 600; 310; 30; 20 MG/100ML; MG/100ML; MG/100ML; MG/100ML
5 INJECTION, SOLUTION INTRAVENOUS CONTINUOUS
Status: DISCONTINUED | OUTPATIENT
Start: 2024-11-05 | End: 2024-11-05

## 2024-11-05 RX ORDER — CEFAZOLIN 1 G/1
INJECTION, POWDER, FOR SOLUTION INTRAVENOUS AS NEEDED
Status: DISCONTINUED | OUTPATIENT
Start: 2024-11-05 | End: 2024-11-05

## 2024-11-05 RX ORDER — HEPARIN SODIUM 1000 [USP'U]/ML
INJECTION, SOLUTION INTRAVENOUS; SUBCUTANEOUS AS NEEDED
Status: DISCONTINUED | OUTPATIENT
Start: 2024-11-05 | End: 2024-11-05

## 2024-11-05 RX ORDER — PROMETHAZINE HYDROCHLORIDE AND DEXTROMETHORPHAN HYDROBROMIDE 6.25; 15 MG/5ML; MG/5ML
5 SYRUP ORAL EVERY 4 HOURS PRN
Status: DISCONTINUED | OUTPATIENT
Start: 2024-11-05 | End: 2024-11-05

## 2024-11-05 RX ORDER — POTASSIUM CHLORIDE 1.5 G/1.58G
20 POWDER, FOR SOLUTION ORAL EVERY 6 HOURS PRN
Status: DISCONTINUED | OUTPATIENT
Start: 2024-11-05 | End: 2024-11-06

## 2024-11-05 RX ORDER — ONDANSETRON HYDROCHLORIDE 2 MG/ML
4 INJECTION, SOLUTION INTRAVENOUS ONCE
Status: COMPLETED | OUTPATIENT
Start: 2024-11-05 | End: 2024-11-05

## 2024-11-05 RX ORDER — POTASSIUM CHLORIDE 20 MEQ/1
20 TABLET, EXTENDED RELEASE ORAL EVERY 6 HOURS PRN
Status: DISCONTINUED | OUTPATIENT
Start: 2024-11-05 | End: 2024-11-06

## 2024-11-05 RX ORDER — CALCIUM CHLORIDE INJECTION 100 MG/ML
INJECTION, SOLUTION INTRAVENOUS AS NEEDED
Status: DISCONTINUED | OUTPATIENT
Start: 2024-11-05 | End: 2024-11-05

## 2024-11-05 RX ORDER — MAGNESIUM SULFATE HEPTAHYDRATE 500 MG/ML
INJECTION, SOLUTION INTRAMUSCULAR; INTRAVENOUS AS NEEDED
Status: DISCONTINUED | OUTPATIENT
Start: 2024-11-05 | End: 2024-11-05

## 2024-11-05 RX ORDER — NALOXONE HYDROCHLORIDE 0.4 MG/ML
0.2 INJECTION, SOLUTION INTRAMUSCULAR; INTRAVENOUS; SUBCUTANEOUS EVERY 5 MIN PRN
Status: DISCONTINUED | OUTPATIENT
Start: 2024-11-05 | End: 2024-11-09

## 2024-11-05 RX ORDER — PANTOPRAZOLE SODIUM 40 MG/1
40 TABLET, DELAYED RELEASE ORAL
Status: DISCONTINUED | OUTPATIENT
Start: 2024-11-06 | End: 2024-11-10 | Stop reason: HOSPADM

## 2024-11-05 RX ORDER — POTASSIUM CHLORIDE 14.9 MG/ML
20 INJECTION INTRAVENOUS EVERY 6 HOURS PRN
Status: DISCONTINUED | OUTPATIENT
Start: 2024-11-05 | End: 2024-11-06

## 2024-11-05 RX ORDER — POTASSIUM CHLORIDE 29.8 MG/ML
40 INJECTION INTRAVENOUS EVERY 6 HOURS PRN
Status: DISCONTINUED | OUTPATIENT
Start: 2024-11-05 | End: 2024-11-06

## 2024-11-05 RX ORDER — CEFAZOLIN SODIUM 2 G/100ML
2 INJECTION, SOLUTION INTRAVENOUS EVERY 8 HOURS
Status: COMPLETED | OUTPATIENT
Start: 2024-11-05 | End: 2024-11-07

## 2024-11-05 RX ORDER — ACETAMINOPHEN 325 MG/1
650 TABLET ORAL EVERY 6 HOURS
Status: DISCONTINUED | OUTPATIENT
Start: 2024-11-05 | End: 2024-11-06

## 2024-11-05 RX ORDER — NOREPINEPHRINE BITARTRATE/D5W 8 MG/250ML
0-1 PLASTIC BAG, INJECTION (ML) INTRAVENOUS CONTINUOUS
Status: DISCONTINUED | OUTPATIENT
Start: 2024-11-05 | End: 2024-11-05

## 2024-11-05 RX ORDER — GENTAMICIN 40 MG/ML
INJECTION, SOLUTION INTRAMUSCULAR; INTRAVENOUS AS NEEDED
Status: DISCONTINUED | OUTPATIENT
Start: 2024-11-05 | End: 2024-11-05 | Stop reason: HOSPADM

## 2024-11-05 RX ORDER — METHADONE HYDROCHLORIDE 10 MG/ML
INJECTION, SOLUTION INTRAMUSCULAR; INTRAVENOUS; SUBCUTANEOUS AS NEEDED
Status: DISCONTINUED | OUTPATIENT
Start: 2024-11-05 | End: 2024-11-05

## 2024-11-05 RX ORDER — DEXMEDETOMIDINE HYDROCHLORIDE 4 UG/ML
0-1.5 INJECTION, SOLUTION INTRAVENOUS CONTINUOUS
Status: DISCONTINUED | OUTPATIENT
Start: 2024-11-05 | End: 2024-11-05

## 2024-11-05 RX ORDER — OXYCODONE HYDROCHLORIDE 10 MG/1
10 TABLET ORAL EVERY 4 HOURS PRN
Status: DISCONTINUED | OUTPATIENT
Start: 2024-11-05 | End: 2024-11-07

## 2024-11-05 RX ORDER — APREPITANT 40 MG/1
40 CAPSULE ORAL DAILY
Status: DISCONTINUED | OUTPATIENT
Start: 2024-11-06 | End: 2024-11-05

## 2024-11-05 RX ORDER — POTASSIUM CHLORIDE 1.5 G/1.58G
40 POWDER, FOR SOLUTION ORAL EVERY 6 HOURS PRN
Status: DISCONTINUED | OUTPATIENT
Start: 2024-11-05 | End: 2024-11-06

## 2024-11-05 RX ORDER — LIDOCAINE 560 MG/1
1 PATCH PERCUTANEOUS; TOPICAL; TRANSDERMAL EVERY 24 HOURS
Status: DISCONTINUED | OUTPATIENT
Start: 2024-11-05 | End: 2024-11-10 | Stop reason: HOSPADM

## 2024-11-05 RX ORDER — HYDROMORPHONE HYDROCHLORIDE 1 MG/ML
0.4 INJECTION, SOLUTION INTRAMUSCULAR; INTRAVENOUS; SUBCUTANEOUS
Status: DISCONTINUED | OUTPATIENT
Start: 2024-11-05 | End: 2024-11-06

## 2024-11-05 RX ORDER — SODIUM CHLORIDE, SODIUM GLUCONATE, SODIUM ACETATE, POTASSIUM CHLORIDE AND MAGNESIUM CHLORIDE 30; 37; 368; 526; 502 MG/100ML; MG/100ML; MG/100ML; MG/100ML; MG/100ML
INJECTION, SOLUTION INTRAVENOUS CONTINUOUS PRN
Status: DISCONTINUED | OUTPATIENT
Start: 2024-11-05 | End: 2024-11-05

## 2024-11-05 RX ORDER — ROCURONIUM BROMIDE 10 MG/ML
INJECTION, SOLUTION INTRAVENOUS AS NEEDED
Status: DISCONTINUED | OUTPATIENT
Start: 2024-11-05 | End: 2024-11-05

## 2024-11-05 RX ORDER — POTASSIUM CHLORIDE 20 MEQ/1
40 TABLET, EXTENDED RELEASE ORAL EVERY 6 HOURS PRN
Status: DISCONTINUED | OUTPATIENT
Start: 2024-11-05 | End: 2024-11-06

## 2024-11-05 RX ORDER — MAGNESIUM SULFATE HEPTAHYDRATE 40 MG/ML
4 INJECTION, SOLUTION INTRAVENOUS EVERY 6 HOURS PRN
Status: DISCONTINUED | OUTPATIENT
Start: 2024-11-05 | End: 2024-11-06

## 2024-11-05 RX ORDER — HYDRALAZINE HYDROCHLORIDE 20 MG/ML
10 INJECTION INTRAMUSCULAR; INTRAVENOUS ONCE
Status: COMPLETED | OUTPATIENT
Start: 2024-11-05 | End: 2024-11-05

## 2024-11-05 RX ORDER — SODIUM CHLORIDE, SODIUM LACTATE, POTASSIUM CHLORIDE, CALCIUM CHLORIDE 600; 310; 30; 20 MG/100ML; MG/100ML; MG/100ML; MG/100ML
20 INJECTION, SOLUTION INTRAVENOUS CONTINUOUS
Status: DISCONTINUED | OUTPATIENT
Start: 2024-11-05 | End: 2024-11-05

## 2024-11-05 RX ORDER — PHENYLEPHRINE HCL IN 0.9% NACL 1 MG/10 ML
SYRINGE (ML) INTRAVENOUS AS NEEDED
Status: DISCONTINUED | OUTPATIENT
Start: 2024-11-05 | End: 2024-11-05

## 2024-11-05 RX ORDER — APREPITANT 40 MG/1
40 CAPSULE ORAL ONCE
Status: COMPLETED | OUTPATIENT
Start: 2024-11-05 | End: 2024-11-06

## 2024-11-05 RX ORDER — LIDOCAINE HYDROCHLORIDE 10 MG/ML
INJECTION, SOLUTION INFILTRATION; PERINEURAL AS NEEDED
Status: DISCONTINUED | OUTPATIENT
Start: 2024-11-05 | End: 2024-11-05

## 2024-11-05 RX ORDER — MIDAZOLAM HYDROCHLORIDE 1 MG/ML
INJECTION INTRAMUSCULAR; INTRAVENOUS AS NEEDED
Status: DISCONTINUED | OUTPATIENT
Start: 2024-11-05 | End: 2024-11-05

## 2024-11-05 RX ORDER — AMOXICILLIN 250 MG
2 CAPSULE ORAL 2 TIMES DAILY
Status: DISCONTINUED | OUTPATIENT
Start: 2024-11-05 | End: 2024-11-09

## 2024-11-05 RX ORDER — EPINEPHRINE IN 0.9 % SOD CHLOR 4MG/250ML
0-2 PLASTIC BAG, INJECTION (ML) INTRAVENOUS CONTINUOUS
Status: DISCONTINUED | OUTPATIENT
Start: 2024-11-05 | End: 2024-11-05

## 2024-11-05 RX ORDER — HYDROMORPHONE HYDROCHLORIDE 1 MG/ML
0.2 INJECTION, SOLUTION INTRAMUSCULAR; INTRAVENOUS; SUBCUTANEOUS
Status: DISCONTINUED | OUTPATIENT
Start: 2024-11-05 | End: 2024-11-06

## 2024-11-05 RX ORDER — POLYETHYLENE GLYCOL 3350 17 G/17G
17 POWDER, FOR SOLUTION ORAL 2 TIMES DAILY
Status: DISCONTINUED | OUTPATIENT
Start: 2024-11-05 | End: 2024-11-08

## 2024-11-05 RX ORDER — ATORVASTATIN CALCIUM 10 MG/1
10 TABLET, FILM COATED ORAL NIGHTLY
Status: DISCONTINUED | OUTPATIENT
Start: 2024-11-06 | End: 2024-11-10 | Stop reason: HOSPADM

## 2024-11-05 RX ORDER — CALCIUM GLUCONATE 20 MG/ML
2 INJECTION, SOLUTION INTRAVENOUS EVERY 6 HOURS PRN
Status: DISCONTINUED | OUTPATIENT
Start: 2024-11-05 | End: 2024-11-06

## 2024-11-05 RX ORDER — HYDRALAZINE HYDROCHLORIDE 20 MG/ML
10 INJECTION INTRAMUSCULAR; INTRAVENOUS EVERY 4 HOURS PRN
Status: DISCONTINUED | OUTPATIENT
Start: 2024-11-05 | End: 2024-11-06

## 2024-11-05 RX ORDER — ONDANSETRON HYDROCHLORIDE 2 MG/ML
4 INJECTION, SOLUTION INTRAVENOUS EVERY 4 HOURS PRN
Status: DISCONTINUED | OUTPATIENT
Start: 2024-11-05 | End: 2024-11-10 | Stop reason: HOSPADM

## 2024-11-05 RX ORDER — PROPOFOL 10 MG/ML
INJECTION, EMULSION INTRAVENOUS AS NEEDED
Status: DISCONTINUED | OUTPATIENT
Start: 2024-11-05 | End: 2024-11-05

## 2024-11-05 RX ORDER — VANCOMYCIN HYDROCHLORIDE 1 G/20ML
INJECTION, POWDER, LYOPHILIZED, FOR SOLUTION INTRAVENOUS AS NEEDED
Status: DISCONTINUED | OUTPATIENT
Start: 2024-11-05 | End: 2024-11-05

## 2024-11-05 RX ORDER — NAPROXEN SODIUM 220 MG/1
81 TABLET, FILM COATED ORAL DAILY
Status: DISCONTINUED | OUTPATIENT
Start: 2024-11-06 | End: 2024-11-08

## 2024-11-05 RX ORDER — PROPOFOL 10 MG/ML
0-50 INJECTION, EMULSION INTRAVENOUS CONTINUOUS
Status: DISCONTINUED | OUTPATIENT
Start: 2024-11-05 | End: 2024-11-05

## 2024-11-05 RX ORDER — MAGNESIUM SULFATE HEPTAHYDRATE 40 MG/ML
2 INJECTION, SOLUTION INTRAVENOUS EVERY 6 HOURS PRN
Status: DISCONTINUED | OUTPATIENT
Start: 2024-11-05 | End: 2024-11-06

## 2024-11-05 RX ORDER — NOREPINEPHRINE BITARTRATE 0.03 MG/ML
INJECTION, SOLUTION INTRAVENOUS CONTINUOUS PRN
Status: DISCONTINUED | OUTPATIENT
Start: 2024-11-05 | End: 2024-11-05

## 2024-11-05 RX ORDER — FAMOTIDINE 20 MG/1
40 TABLET, FILM COATED ORAL DAILY
Status: DISCONTINUED | OUTPATIENT
Start: 2024-11-05 | End: 2024-11-10 | Stop reason: HOSPADM

## 2024-11-05 RX ORDER — PROTAMINE SULFATE 10 MG/ML
INJECTION, SOLUTION INTRAVENOUS AS NEEDED
Status: DISCONTINUED | OUTPATIENT
Start: 2024-11-05 | End: 2024-11-05

## 2024-11-05 RX ORDER — VANCOMYCIN HYDROCHLORIDE 1 G/20ML
INJECTION, POWDER, LYOPHILIZED, FOR SOLUTION INTRAVENOUS AS NEEDED
Status: DISCONTINUED | OUTPATIENT
Start: 2024-11-05 | End: 2024-11-05 | Stop reason: HOSPADM

## 2024-11-05 RX ORDER — CALCIUM GLUCONATE 20 MG/ML
1 INJECTION, SOLUTION INTRAVENOUS EVERY 6 HOURS PRN
Status: DISCONTINUED | OUTPATIENT
Start: 2024-11-05 | End: 2024-11-06

## 2024-11-05 RX ADMIN — HYDROMORPHONE HYDROCHLORIDE 0.2 MG: 1 INJECTION, SOLUTION INTRAMUSCULAR; INTRAVENOUS; SUBCUTANEOUS at 20:09

## 2024-11-05 RX ADMIN — OXYCODONE HYDROCHLORIDE 10 MG: 10 TABLET ORAL at 15:22

## 2024-11-05 RX ADMIN — POLYETHYLENE GLYCOL 3350 17 G: 17 POWDER, FOR SOLUTION ORAL at 20:26

## 2024-11-05 RX ADMIN — ALBUMIN HUMAN 25 G: 0.05 INJECTION, SOLUTION INTRAVENOUS at 14:56

## 2024-11-05 RX ADMIN — HYDRALAZINE HYDROCHLORIDE 10 MG: 20 INJECTION INTRAMUSCULAR; INTRAVENOUS at 15:28

## 2024-11-05 RX ADMIN — HYDRALAZINE HYDROCHLORIDE 10 MG: 20 INJECTION INTRAMUSCULAR; INTRAVENOUS at 20:10

## 2024-11-05 RX ADMIN — HYDROMORPHONE HYDROCHLORIDE 0.4 MG: 1 INJECTION, SOLUTION INTRAMUSCULAR; INTRAVENOUS; SUBCUTANEOUS at 22:02

## 2024-11-05 RX ADMIN — ONDANSETRON 4 MG: 2 INJECTION INTRAMUSCULAR; INTRAVENOUS at 16:32

## 2024-11-05 RX ADMIN — HYDRALAZINE HYDROCHLORIDE 10 MG: 20 INJECTION INTRAMUSCULAR; INTRAVENOUS at 18:32

## 2024-11-05 RX ADMIN — ONDANSETRON 4 MG: 2 INJECTION INTRAMUSCULAR; INTRAVENOUS at 20:09

## 2024-11-05 RX ADMIN — CALCIUM GLUCONATE 1 G: 20 INJECTION, SOLUTION INTRAVENOUS at 16:49

## 2024-11-05 RX ADMIN — SODIUM CHLORIDE, POTASSIUM CHLORIDE, SODIUM LACTATE AND CALCIUM CHLORIDE 500 ML: 600; 310; 30; 20 INJECTION, SOLUTION INTRAVENOUS at 13:52

## 2024-11-05 RX ADMIN — HYDROMORPHONE HYDROCHLORIDE 0.2 MG: 1 INJECTION, SOLUTION INTRAMUSCULAR; INTRAVENOUS; SUBCUTANEOUS at 14:21

## 2024-11-05 RX ADMIN — OXYCODONE HYDROCHLORIDE 10 MG: 10 TABLET ORAL at 21:04

## 2024-11-05 RX ADMIN — ACETAMINOPHEN 650 MG: 325 TABLET, FILM COATED ORAL at 20:26

## 2024-11-05 RX ADMIN — ONDANSETRON 4 MG: 2 INJECTION INTRAMUSCULAR; INTRAVENOUS at 18:04

## 2024-11-05 RX ADMIN — LIDOCAINE 1 PATCH: 4 PATCH TOPICAL at 14:21

## 2024-11-05 RX ADMIN — SENNOSIDES AND DOCUSATE SODIUM 2 TABLET: 50; 8.6 TABLET ORAL at 20:26

## 2024-11-05 RX ADMIN — CEFAZOLIN SODIUM 2 G: 2 INJECTION, SOLUTION INTRAVENOUS at 20:26

## 2024-11-05 SDOH — ECONOMIC STABILITY: HOUSING INSECURITY: AT ANY TIME IN THE PAST 12 MONTHS, WERE YOU HOMELESS OR LIVING IN A SHELTER (INCLUDING NOW)?: PATIENT UNABLE TO ANSWER

## 2024-11-05 SDOH — ECONOMIC STABILITY: TRANSPORTATION INSECURITY
IN THE PAST 12 MONTHS, HAS LACK OF TRANSPORTATION KEPT YOU FROM MEDICAL APPOINTMENTS OR FROM GETTING MEDICATIONS?: PATIENT UNABLE TO ANSWER

## 2024-11-05 SDOH — ECONOMIC STABILITY: INCOME INSECURITY
IN THE PAST 12 MONTHS HAS THE ELECTRIC, GAS, OIL, OR WATER COMPANY THREATENED TO SHUT OFF SERVICES IN YOUR HOME?: PATIENT UNABLE TO ANSWER

## 2024-11-05 SDOH — SOCIAL STABILITY: SOCIAL INSECURITY: HAVE YOU HAD ANY THOUGHTS OF HARMING ANYONE ELSE?: UNABLE TO ASSESS

## 2024-11-05 SDOH — ECONOMIC STABILITY: FOOD INSECURITY
WITHIN THE PAST 12 MONTHS, YOU WORRIED THAT YOUR FOOD WOULD RUN OUT BEFORE YOU GOT THE MONEY TO BUY MORE.: PATIENT UNABLE TO ANSWER

## 2024-11-05 SDOH — SOCIAL STABILITY: SOCIAL INSECURITY: WITHIN THE LAST YEAR, HAVE YOU BEEN AFRAID OF YOUR PARTNER OR EX-PARTNER?: PATIENT UNABLE TO ANSWER

## 2024-11-05 SDOH — SOCIAL STABILITY: SOCIAL INSECURITY: DOES ANYONE TRY TO KEEP YOU FROM HAVING/CONTACTING OTHER FRIENDS OR DOING THINGS OUTSIDE YOUR HOME?: UNABLE TO ASSESS

## 2024-11-05 SDOH — ECONOMIC STABILITY: FOOD INSECURITY
HOW HARD IS IT FOR YOU TO PAY FOR THE VERY BASICS LIKE FOOD, HOUSING, MEDICAL CARE, AND HEATING?: PATIENT UNABLE TO ANSWER

## 2024-11-05 SDOH — SOCIAL STABILITY: SOCIAL INSECURITY
WITHIN THE LAST YEAR, HAVE YOU BEEN KICKED, HIT, SLAPPED, OR OTHERWISE PHYSICALLY HURT BY YOUR PARTNER OR EX-PARTNER?: PATIENT UNABLE TO ANSWER

## 2024-11-05 SDOH — HEALTH STABILITY: MENTAL HEALTH
DO YOU FEEL STRESS - TENSE, RESTLESS, NERVOUS, OR ANXIOUS, OR UNABLE TO SLEEP AT NIGHT BECAUSE YOUR MIND IS TROUBLED ALL THE TIME - THESE DAYS?: PATIENT UNABLE TO ANSWER

## 2024-11-05 SDOH — SOCIAL STABILITY: SOCIAL INSECURITY: ARE YOU MARRIED, WIDOWED, DIVORCED, SEPARATED, NEVER MARRIED, OR LIVING WITH A PARTNER?: PATIENT UNABLE TO ANSWER

## 2024-11-05 SDOH — ECONOMIC STABILITY: HOUSING INSECURITY: IN THE PAST 12 MONTHS, HOW MANY TIMES HAVE YOU MOVED WHERE YOU WERE LIVING?: 0

## 2024-11-05 SDOH — SOCIAL STABILITY: SOCIAL INSECURITY: ABUSE: ADULT

## 2024-11-05 SDOH — SOCIAL STABILITY: SOCIAL INSECURITY: HAS ANYONE EVER THREATENED TO HURT YOUR FAMILY OR YOUR PETS?: UNABLE TO ASSESS

## 2024-11-05 SDOH — ECONOMIC STABILITY: FOOD INSECURITY
WITHIN THE PAST 12 MONTHS, THE FOOD YOU BOUGHT JUST DIDN'T LAST AND YOU DIDN'T HAVE MONEY TO GET MORE.: PATIENT UNABLE TO ANSWER

## 2024-11-05 SDOH — SOCIAL STABILITY: SOCIAL INSECURITY: HAVE YOU HAD THOUGHTS OF HARMING ANYONE ELSE?: UNABLE TO ASSESS

## 2024-11-05 SDOH — SOCIAL STABILITY: SOCIAL INSECURITY
WITHIN THE LAST YEAR, HAVE YOU BEEN HUMILIATED OR EMOTIONALLY ABUSED IN OTHER WAYS BY YOUR PARTNER OR EX-PARTNER?: PATIENT UNABLE TO ANSWER

## 2024-11-05 SDOH — SOCIAL STABILITY: SOCIAL NETWORK: HOW OFTEN DO YOU ATTEND CHURCH OR RELIGIOUS SERVICES?: PATIENT UNABLE TO ANSWER

## 2024-11-05 SDOH — SOCIAL STABILITY: SOCIAL INSECURITY: WERE YOU ABLE TO COMPLETE ALL THE BEHAVIORAL HEALTH SCREENINGS?: NO

## 2024-11-05 SDOH — SOCIAL STABILITY: SOCIAL NETWORK
DO YOU BELONG TO ANY CLUBS OR ORGANIZATIONS SUCH AS CHURCH GROUPS, UNIONS, FRATERNAL OR ATHLETIC GROUPS, OR SCHOOL GROUPS?: PATIENT UNABLE TO ANSWER

## 2024-11-05 SDOH — SOCIAL STABILITY: SOCIAL INSECURITY
WITHIN THE LAST YEAR, HAVE YOU BEEN RAPED OR FORCED TO HAVE ANY KIND OF SEXUAL ACTIVITY BY YOUR PARTNER OR EX-PARTNER?: PATIENT UNABLE TO ANSWER

## 2024-11-05 SDOH — SOCIAL STABILITY: SOCIAL INSECURITY: ARE THERE ANY APPARENT SIGNS OF INJURIES/BEHAVIORS THAT COULD BE RELATED TO ABUSE/NEGLECT?: UNABLE TO ASSESS

## 2024-11-05 SDOH — SOCIAL STABILITY: SOCIAL INSECURITY: DO YOU FEEL ANYONE HAS EXPLOITED OR TAKEN ADVANTAGE OF YOU FINANCIALLY OR OF YOUR PERSONAL PROPERTY?: UNABLE TO ASSESS

## 2024-11-05 SDOH — SOCIAL STABILITY: SOCIAL NETWORK: IN A TYPICAL WEEK, HOW MANY TIMES DO YOU TALK ON THE PHONE WITH FAMILY, FRIENDS, OR NEIGHBORS?: PATIENT UNABLE TO ANSWER

## 2024-11-05 SDOH — HEALTH STABILITY: MENTAL HEALTH: CURRENT SMOKER: 0

## 2024-11-05 SDOH — ECONOMIC STABILITY: HOUSING INSECURITY
IN THE LAST 12 MONTHS, WAS THERE A TIME WHEN YOU WERE NOT ABLE TO PAY THE MORTGAGE OR RENT ON TIME?: PATIENT UNABLE TO ANSWER

## 2024-11-05 SDOH — SOCIAL STABILITY: SOCIAL NETWORK: HOW OFTEN DO YOU ATTEND MEETINGS OF THE CLUBS OR ORGANIZATIONS YOU BELONG TO?: PATIENT UNABLE TO ANSWER

## 2024-11-05 SDOH — SOCIAL STABILITY: SOCIAL INSECURITY: ARE YOU OR HAVE YOU BEEN THREATENED OR ABUSED PHYSICALLY, EMOTIONALLY, OR SEXUALLY BY ANYONE?: UNABLE TO ASSESS

## 2024-11-05 SDOH — SOCIAL STABILITY: SOCIAL INSECURITY: DO YOU FEEL UNSAFE GOING BACK TO THE PLACE WHERE YOU ARE LIVING?: UNABLE TO ASSESS

## 2024-11-05 SDOH — SOCIAL STABILITY: SOCIAL NETWORK: HOW OFTEN DO YOU GET TOGETHER WITH FRIENDS OR RELATIVES?: PATIENT UNABLE TO ANSWER

## 2024-11-05 ASSESSMENT — COGNITIVE AND FUNCTIONAL STATUS - GENERAL
TURNING FROM BACK TO SIDE WHILE IN FLAT BAD: A LITTLE
PERSONAL GROOMING: A LITTLE
MOVING TO AND FROM BED TO CHAIR: A LITTLE
WALKING IN HOSPITAL ROOM: A LITTLE
DAILY ACTIVITIY SCORE: 18
EATING MEALS: A LITTLE
MOVING FROM LYING ON BACK TO SITTING ON SIDE OF FLAT BED WITH BEDRAILS: A LITTLE
DRESSING REGULAR UPPER BODY CLOTHING: A LITTLE
STANDING UP FROM CHAIR USING ARMS: A LITTLE
PATIENT BASELINE BEDBOUND: NO
MOBILITY SCORE: 18
HELP NEEDED FOR BATHING: A LITTLE
CLIMB 3 TO 5 STEPS WITH RAILING: A LITTLE
TOILETING: A LITTLE
DRESSING REGULAR LOWER BODY CLOTHING: A LITTLE

## 2024-11-05 ASSESSMENT — PAIN - FUNCTIONAL ASSESSMENT
PAIN_FUNCTIONAL_ASSESSMENT: 0-10
PAIN_FUNCTIONAL_ASSESSMENT: CPOT (CRITICAL CARE PAIN OBSERVATION TOOL)
PAIN_FUNCTIONAL_ASSESSMENT: 0-10
PAIN_FUNCTIONAL_ASSESSMENT: CPOT (CRITICAL CARE PAIN OBSERVATION TOOL)
PAIN_FUNCTIONAL_ASSESSMENT: 0-10
PAIN_FUNCTIONAL_ASSESSMENT: 0-10
PAIN_FUNCTIONAL_ASSESSMENT: CPOT (CRITICAL CARE PAIN OBSERVATION TOOL)
PAIN_FUNCTIONAL_ASSESSMENT: 0-10

## 2024-11-05 ASSESSMENT — PAIN SCALES - GENERAL
PAINLEVEL_OUTOF10: 9
PAINLEVEL_OUTOF10: 9
PAINLEVEL_OUTOF10: 0 - NO PAIN
PAINLEVEL_OUTOF10: 9
PAINLEVEL_OUTOF10: 5 - MODERATE PAIN
PAINLEVEL_OUTOF10: 7
PAINLEVEL_OUTOF10: 9
PAINLEVEL_OUTOF10: 3

## 2024-11-05 ASSESSMENT — LIFESTYLE VARIABLES
SKIP TO QUESTIONS 9-10: 0
AUDIT-C TOTAL SCORE: -1
AUDIT-C TOTAL SCORE: -1
HOW MANY STANDARD DRINKS CONTAINING ALCOHOL DO YOU HAVE ON A TYPICAL DAY: PATIENT UNABLE TO ANSWER
HOW OFTEN DO YOU HAVE A DRINK CONTAINING ALCOHOL: PATIENT UNABLE TO ANSWER
HOW OFTEN DO YOU HAVE 6 OR MORE DRINKS ON ONE OCCASION: PATIENT UNABLE TO ANSWER

## 2024-11-05 ASSESSMENT — PAIN DESCRIPTION - ORIENTATION: ORIENTATION: MID

## 2024-11-05 ASSESSMENT — ACTIVITIES OF DAILY LIVING (ADL)
HEARING - LEFT EAR: UNABLE TO ASSESS
ADEQUATE_TO_COMPLETE_ADL: UNABLE TO ASSESS
DRESSING YOURSELF: UNABLE TO ASSESS
GROOMING: UNABLE TO ASSESS
PATIENT'S MEMORY ADEQUATE TO SAFELY COMPLETE DAILY ACTIVITIES?: UNABLE TO ASSESS
HEARING - RIGHT EAR: UNABLE TO ASSESS
JUDGMENT_ADEQUATE_SAFELY_COMPLETE_DAILY_ACTIVITIES: UNABLE TO ASSESS
FEEDING YOURSELF: UNABLE TO ASSESS
LACK_OF_TRANSPORTATION: PATIENT UNABLE TO ANSWER
LACK_OF_TRANSPORTATION: PATIENT UNABLE TO ANSWER
BATHING: UNABLE TO ASSESS
TOILETING: UNABLE TO ASSESS
WALKS IN HOME: UNABLE TO ASSESS

## 2024-11-05 ASSESSMENT — PATIENT HEALTH QUESTIONNAIRE - PHQ9
SUM OF ALL RESPONSES TO PHQ9 QUESTIONS 1 & 2: 0
2. FEELING DOWN, DEPRESSED OR HOPELESS: NOT AT ALL
1. LITTLE INTEREST OR PLEASURE IN DOING THINGS: NOT AT ALL

## 2024-11-05 ASSESSMENT — COLUMBIA-SUICIDE SEVERITY RATING SCALE - C-SSRS
1. IN THE PAST MONTH, HAVE YOU WISHED YOU WERE DEAD OR WISHED YOU COULD GO TO SLEEP AND NOT WAKE UP?: NO
2. HAVE YOU ACTUALLY HAD ANY THOUGHTS OF KILLING YOURSELF?: NO
6. HAVE YOU EVER DONE ANYTHING, STARTED TO DO ANYTHING, OR PREPARED TO DO ANYTHING TO END YOUR LIFE?: NO

## 2024-11-05 ASSESSMENT — PAIN DESCRIPTION - LOCATION: LOCATION: CHEST

## 2024-11-05 NOTE — PROGRESS NOTES
CARDIAC SURGERY  11/05 - Bentall's procedure, posterior pericardiotomy with Dr. James BROWN PLAN  TBD - Care Transitions is following to develop a safe & supportive discharge plan in collaboration with multidisciplinary team (&) patient/family/significant others.      PAYOR   Aurelia Sandoval Medicare     PT/OT   ( ) Awaiting    COMPLETED  (X) Daily ongoing review of patient via chart and/or (M-F) IDT rounds    Cheyenne Gimenez (LSW, MSW)

## 2024-11-05 NOTE — ANESTHESIA POSTPROCEDURE EVALUATION
Patient: Kiley Ward    Procedure Summary       Date: 11/05/24 Room / Location: [facility] OR 21 / [facility]    Anesthesia Start: 0732 Anesthesia Stop: 1333    Procedure: Median Sternotomy; Aortic Valve Replacement w/ 23mm Avalus Ultra; Aortic Root Replacement and Ascending Aorta Replacement w/ 28mm straight gelweave graft Diagnosis:       Aneurysm of ascending aorta without rupture (CMS-HCC)      (Aneurysm of ascending aorta without rupture (CMS-HCC) [I71.21])    Surgeons: Efrain Ramirez MD Responsible Provider: Cullen Connelly MD    Anesthesia Type: general ASA Status: 3            Anesthesia Type: general    Vitals Value Taken Time   /68 11/05/24 1333   Temp 36.6 11/05/24 1333   Pulse 61 11/05/24 1332   Resp 12 11/05/24 1332   SpO2 100 % 11/05/24 1332   Vitals shown include unfiled device data.    Anesthesia Post Evaluation    Patient location during evaluation: ICU  Patient participation: waiting for patient participation  Level of consciousness: sedated  Pain management: adequate  Airway patency: patent  Cardiovascular status: acceptable  Respiratory status: ETT  Hydration status: acceptable  Postoperative Nausea and Vomiting: none        There were no known notable events for this encounter.

## 2024-11-05 NOTE — ANESTHESIA PREPROCEDURE EVALUATION
Patient: Kiley Ward    Procedure Information       Date/Time: 11/05/24 0645    Procedure: Median Sternotomy, Replace/Repair Aortic Valve and Replace Aortic Root, Ascending Aorta and hemiarch, hypothermic circulatory arrest, retrograde cerebral perfusion, axillary artery cannulation, +/- coronary surgery    Location: [facility] OR 21 / [facility] OR    Surgeons: Efrain Ramirez MD            Relevant Problems   Anesthesia (within normal limits)      Cardiac   (+) Aneurysm of ascending aorta without rupture (CMS-HCC)   (+) Ascending aortic aneurysm, unspecified whether ruptured (CMS-HCC)   (+) HTN (hypertension)   (+) Hyperlipidemia      Pulmonary (within normal limits)      Neuro (within normal limits)      GI   (+) GERD (gastroesophageal reflux disease)      /Renal (within normal limits)      Liver (within normal limits)      Endocrine   (+) Hyperthyroidism      Hematology   (+) Acute deep vein thrombosis (DVT) of upper extremity (Multi) (Hx DVT in 2021)      Musculoskeletal   (+) Arthritis      ID (within normal limits)      Digestive   (+) Diverticular disease   (+) Short-segment Crocker's esophagus      Hematology and Neoplasia   (+) History of breast cancer in female       Clinical information reviewed:   Tobacco  Allergies  Meds   Med Hx  Surg Hx   Fam Hx  Soc Hx        NPO Detail:  No data recorded    Vitals:    11/05/24 0617   BP: 152/73   Pulse: 61   Resp: 16   Temp: 36.4 °C (97.5 °F)   SpO2: 99%           Chemistry    Lab Results   Component Value Date/Time     10/17/2024 1103    K 4.3 10/17/2024 1103     10/17/2024 1103    CO2 27 10/17/2024 1103    BUN 18 10/17/2024 1103    CREATININE 0.74 10/17/2024 1103    Lab Results   Component Value Date/Time    CALCIUM 9.6 10/17/2024 1103    ALKPHOS 74 10/17/2024 1103    AST 26 10/17/2024 1103    ALT 30 10/17/2024 1103    BILITOT 0.7 10/17/2024 1103          Lab Results   Component Value Date/Time    WBC 4.2 (L) 10/17/2024 1103    HGB  12.4 10/17/2024 1103    HCT 38.3 10/17/2024 1103     10/17/2024 1103     Lab Results   Component Value Date/Time    PROTIME 11.7 10/17/2024 1103    INR 1.0 10/17/2024 1103     No results found for this or any previous visit (from the past 4464 hours).    Transthoracic Echo Complete    Result Date: 5/31/2024      TRANSTHORACIC ECHO (TTE) COMPLETE      PHYSICIAN INTERPRETATION: Left Ventricle: The left ventricular systolic function is low normal, with an estimated ejection fraction of 50-55%. There are no regional wall motion abnormalities. The left ventricular cavity size is normal. Spectral Doppler shows an impaired relaxation pattern of left ventricular diastolic filling. Left Atrium: The left atrium is mild to moderately dilated. Right Ventricle: The right ventricle is normal in size. There is normal right ventricular global systolic function. Right Atrium: The right atrium is normal in size. Aortic Valve: The aortic valve appears structurally normal. There is evidence of mild aortic valve stenosis. There is mild aortic valve regurgitation. The peak instantaneous gradient of the aortic valve is 22.0 mmHg. The mean gradient of the aortic valve is 10.1 mmHg. Mitral Valve: The mitral valve is normal in structure. There is trace to mild mitral valve regurgitation. Tricuspid Valve: The tricuspid valve is structurally normal. There is mild tricuspid regurgitation. The Doppler estimated RVSP is mildly elevated at 33.6 mmHg. Pulmonic Valve: The pulmonic valve is structurally normal. There is trace pulmonic valve regurgitation. Pericardium: There is a trivial pericardial effusion. Aorta: The aortic root is abnormal. There is moderate dilatation of the ascending aorta. There is mild dilatation of the aortic root. Systemic Veins: The inferior vena cava appears to be of normal size. There is IVC inspiratory collapse greater than 50%. In comparison to the previous echocardiogram(s): There are no prior studies on this  patient for comparison purposes.    CONCLUSIONS:  1. Left ventricular systolic function is low normal with a 50-55% estimated ejection fraction.  2. Spectral Doppler shows an impaired relaxation pattern of left ventricular diastolic filling.  3. The left atrium is mild to moderately dilated.  4. Mildly elevated RVSP.  5. Mild aortic valve stenosis.  6. Mild aortic valve regurgitation.  7. There is moderate dilatation of the ascending aorta.      Physical Exam    Airway  Mallampati: III  Neck ROM: full     Cardiovascular   Rhythm: regular  Rate: normal     Dental - normal exam     Pulmonary   Breath sounds clear to auscultation     Abdominal            Anesthesia Plan    History of general anesthesia?: yes  History of complications of general anesthesia?: no    ASA 3     general     The patient is not a current smoker.    Anesthetic plan and risks discussed with patient.  Use of blood products discussed with patient who consented to blood products.    Plan discussed with attending and resident.

## 2024-11-05 NOTE — OP NOTE
Median Sternotomy; Aortic Valve Replacement w/ 23mm Avalus Ultra; Aortic Root Replacement and Ascending Aorta Replacement w/ 28mm straight gelweave graft Operative Note     Date: 2024  OR Location: [facility] OR    Name: Kiley Ward : 1955, Age: 69 y.o., MRN: [id number], Sex: female    Diagnosis  Pre-op Diagnosis      * Aneurysm of ascending aorta without rupture (CMS-HCC) [I71.21] Post-op Diagnosis     * Aneurysm of ascending aorta without rupture (CMS-HCC) [I71.21]     Procedures  Median sternotomy, composite replacement of aortic root and ascending aorta with 2 23 mm Availnex aortic valve and 28 mm Gelweave graft, reimplantation of coronary arteries, and posterior pericardiotomy    Surgeons      * Efrain Ramirez - Primary    Resident/Fellow/Other Assistant:  Surgeons and Role:  * No surgeons found with a matching role *    Staff:   Circulator: Rossy  Scrub Person: Chastity Peckub Person: Roberto  Circulator: Tiffanie  Surgical Assistant: Roger  Surgical Assistant: Dayo  Surgical Assistant: Aryan  Surgical Assistant: Edy Vogel Scrub: Raul  Surgical Assistant: Jolynn Vogel Circulator: Roberto    Anesthesia Staff: Anesthesiologist: Cullen Connelly MD  Anesthesia Resident: Lexi White DO  Perfusionist: Dao Hook  Frontline Breaker: Phoebe Munguia MD    Procedure Summary  Anesthesia: General  ASA: III  Estimated Blood Loss: 250 mL  Intra-op Medications:   Administrations occurring from 0645 to 1345 on 24:   Medication Name Total Dose   sodium chloride 0.9 % irrigation solution 4,000 mL   vancomycin (Vancocin) vial for injection 8 g   gentamicin (Garamycin) injection 80 mg   calcium chloride 100 mg/mL syringe 1 g   ceFAZolin (Ancef) vial 1 g 4 g   dexmedeTOMIDine (Precedex) 400 mcg/100 mL NS ( premix) 54.69 mcg   electrolyte-A (Plasmalyte-A) solution Cannot be calculated   fentaNYL (Sublimaze) injection 50 mcg/mL 450 mcg   heparin injection 1,000  units/mL 31,000 Units   insulin regular (HumuLIN R,NovoLIN R) 100 Units in sodium chloride 0.9% 100 mL (1 Units/mL) infusion 1.97 Units   insulin regular (HumuLIN R, NovoLIN R) bolus from bag 4 Units   lidocaine (Xylocaine) injection 1 % 100 mg   lidocaine (Xylocaine) injection 2 % 60 mg   magnesium sulfate 50 % injection 2 g   methadone (Dolophine) injection 15 mg   midazolam PF (Versed) injection 1 mg/mL 2 mg   norepinephrine (Levophed) 8 mg in sodium chloride 0.9% 250 mL (0.032 mg/mL) infusion (premix) 0.04 mg   norepinephrine (Levophed) 16 mcg/mL syringe for Anesthesia 32 mcg   perfusion prime builder 910 mL   phenylephrine (Jimmy-Synephrine) injection 9,400 mL   phenylephrine 100 mcg/mL syringe 10 mL (prefilled) 80 mcg   propofol (Diprivan) injection 10 mg/mL 572.98 mg   protamine injection 250 mg   rocuronium (ZeMuron) 50 mg/5 mL injection 120 mg   sugammadex (Bridion) 200 mg/2 mL injection 200 mg   vancomycin 1 g 1 g              Anesthesia Record               Intraprocedure I/O Totals          Intake    Dexmedetomidine 0.00 mL    The total shown is the total volume documented since Anesthesia Start was filed.    plasma-lyte-A IV solution 1500.00 mL    Norepinephrine Drip 0.00 mL    The total shown is the total volume documented since Anesthesia Start was filed.    Insulin Drip 0.00 mL    The total shown is the total volume documented since Anesthesia Start was filed.    perfusion prime builder 910.00 mL    Cell Saver 1106 mL    Total Intake 3516 mL       Output    Urine 835 mL    Total Output 835 mL       Net    Net Volume 2681 mL          Specimen:   ID Type Source Tests Collected by Time   1 : AORTIC VALVE LEAFLETS Tissue HEART VALVE-AORTIC SURGICAL PATHOLOGY EXAM Efrain Ramirez MD 11/5/2024 1204                 Drains and/or Catheters:   Chest Tube 1 Anterior Mediastinal (Active)   Function -20 cm H2O 11/05/24 1325   Chest Tube Air Leak No 11/05/24 1325   Drainage Description Serosanguineous 11/05/24 1325    Dressing Status Clean;Dry;Occlusive 11/05/24 1325   Site Assessment Clean;Dry;Intact 11/05/24 1325   Surrounding Skin Dry;Intact 11/05/24 1325   Output (mL) 125 mL 11/05/24 1325       Chest Tube 2 Left Pleural (Active)   Function -20 cm H2O 11/05/24 1325   Chest Tube Air Leak No 11/05/24 1325   Drainage Description Serosanguineous 11/05/24 1325   Dressing Status Clean;Dry;Occlusive 11/05/24 1325   Site Assessment Clean;Dry;Intact 11/05/24 1325   Surrounding Skin Dry;Intact 11/05/24 1325   Output (mL) 10 mL 11/05/24 1325       Urethral Catheter Temperature probe 14 Fr. (Active)   Site Assessment Clean;Skin intact 11/05/24 1325   Collection Container Urometer 11/05/24 1325   Securement Method Securing device (Describe) 11/05/24 1325   Reason for Continuing Urinary Catheterization accurate hourly measurement of urine volume in a critically ill patient that cannot be assessed by other volumes and urine collection strategies 11/05/24 1325   Output (mL) 325 mL 11/05/24 1325       Tourniquet Times:         Implants:  Implants       Type Name Action Serial No.      Implant PATCH, VASCULAR, MESH, LOW POROSITY, 6 X 6 IN, EPTFE - [id number] Implanted      Implant GRAFT, VASCUTEK GELWEAVE 28 X 30 - [id number] Implanted [id number]     Heart Valve Repair VALVE, AORTIC AVALUS ULTRA, 23MM - [id number] - [id number] Implanted [id number]              Findings: There were no pericardial adhesions or effusions.  Aortic root and ascending aorta were dilated however the arch was of normal size and it was suitable for cannulation.  We were able to clamp above the aneurysmal segment of the ascending aorta.  Aortic valve leaflets were thickened and demonstrated moderate aortic insufficiency.  I did not think this valve could be preserved therefore we elected to replace it with a tissue valve.  The heart was of normal size and demonstrated normal contractility.    Indications: Kiley Ward is an 69 y.o. female who is having surgery for  Aneurysm of ascending aorta without rupture (CMS-[facility]) [I71.21].  The patient underwent routine screening of her coronary arteries.  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  This was confirmed by dedicated CT which demonstrated her ascending aorta to be over 5 cm in diameter.  In addition echocardiography demonstrated moderate aortic insufficiency.  Coronary angiography demonstrated normal coronary arteries without any significant atherosclerosis.  The patient was referred for aortic valve repair or replacement and replacement of her aortic root and ascending aortic aneurysm.    Procedure Details: A median sternotomy was performed.  The thymus was divided and the pericardium was opened.  Aortic findings were as described above.  The patient was heparinized.  The aortic arch and right atrium were cannulated for cardiopulmonary bypass and antegrade and retrograde cardioplegia cannulas were placed.  The patient was placed on cardiopulmonary bypass, the underside of the aortic arch was crossclamped, and the heart was arrested and protected with cold blood cardioplegia.  During the remainder of the cross-clamp time cold blood cardioplegia was given every 15 to 20 minutes.  The aorta was transected at the sinotubular junction and the coronary ostia were mobilized as buttons.  Aortic valve leaflets were excised.  Aortic valve and aortic root were then replaced with a composite graft composed of a 23 mm bovine pericardial aortic valve and 28 mm Gelweave graft.  Pledgeted 2-0 sutures were first placed through the aortic valve annulus, then through the sewing ring of the aortic valve, and then through the Gelweave graft.  The sutures were fastened down and the graft and aortic valve seated well.  The coronary ostia were then anastomosed to the side of the graft in end-to-side fashion with running 5-0 Prolene suture.  Strips of felt were used as washers.  The underside of the  aortic arch was then transected just proximal to the aortic cross-clamp.  The Gelweave graft was then sewn to the underside of the aortic arch with a running 4-0 Prolene stitch.  A strip of felt was used as a washer.  The heart and ascending aorta were de-aired and aortic cross-clamp was removed.  A posterior pericardiotomy was then performed extending from the inferior left pulmonary vein down to the diaphragm.  When the patient's heart was completely recovered and thoroughly de-aired, she was weaned from cardiopulmonary bypass without difficulty.  Intraoperative transesophageal echocardiography demonstrated similar ventricular function and a well-functioning bioprosthetic aortic valve.  All cannulas were removed and the heparin effect was reversed with protamine.  Hemostasis was obtained, right ventricular pacemaker wires were placed on the diaphragmatic surface of the RV, mediastinal and left pleural chest tubes were placed, and the wounds were closed in the standard fashion.  The patient tolerated the procedure well and was brought to the intensive care unit in stable condition.  To sponge counts, instrument counts, and needle counts reported correct by the circulating nurse.  The specimen sent to pathology included the aortic valve leaflets.  The estimated blood loss was 250 cc.  I performed the procedure.    Complications: None  Disposition: ICU - intubated and hemodynamically stable.  Condition: stable         Additional Details: None    Attending Attestation: I performed the procedure.    Efrain Ramirez  Phone Number: [phone]

## 2024-11-05 NOTE — ANESTHESIA PROCEDURE NOTES
Peripheral IV  Date/Time: 11/5/2024 7:45 AM      Placement  Needle size: 16 G  Laterality: left  Location: wrist  Local anesthetic: none  Site prep: alcohol  Technique: anatomical landmarks  Attempts: 1

## 2024-11-05 NOTE — CARE PLAN
Problem: Skin  Goal: Decreased wound size/increased tissue granulation at next dressing change  Outcome: Progressing  Goal: Participates in plan/prevention/treatment measures  Outcome: Progressing  Goal: Prevent/manage excess moisture  Outcome: Progressing  Goal: Prevent/minimize sheer/friction injuries  Outcome: Progressing  Goal: Promote/optimize nutrition  Outcome: Progressing  Goal: Promote skin healing  Outcome: Progressing     Problem: Pain - Adult  Goal: Verbalizes/displays adequate comfort level or baseline comfort level  Outcome: Progressing     Problem: Safety - Adult  Goal: Free from fall injury  Outcome: Progressing     Problem: Discharge Planning  Goal: Discharge to home or other facility with appropriate resources  Outcome: Progressing     Problem: Chronic Conditions and Co-morbidities  Goal: Patient's chronic conditions and co-morbidity symptoms are monitored and maintained or improved  Outcome: Progressing     Problem: Respiratory  Goal: Clear secretions with interventions this shift  Outcome: Progressing  Goal: Minimize anxiety/maximize coping throughout shift  Outcome: Progressing  Goal: Minimal/no exertional discomfort or dyspnea this shift  Outcome: Progressing  Goal: No signs of respiratory distress (eg. Use of accessory muscles. Peds grunting)  Outcome: Progressing  Goal: Patent airway maintained this shift  Outcome: Progressing  Goal: Tolerate mechanical ventilation evidenced by VS/agitation level this shift  Outcome: Progressing  Goal: Tolerate pulmonary toileting this shift  Outcome: Progressing  Goal: Verbalize decreased shortness of breath this shift  Outcome: Progressing  Goal: Wean oxygen to maintain O2 saturation per order/standard this shift  Outcome: Progressing  Goal: Increase self care and/or family involvement in next 24 hours  Outcome: Progressing     Problem: Safety - Medical Restraint  Goal: Remains free of injury from restraints (Restraint for Interference with Medical  Device)  Outcome: Progressing  Goal: Free from restraint(s) (Restraint for Interference with Medical Device)  Outcome: Progressing     Problem: Fall/Injury  Goal: Not fall by end of shift  Outcome: Progressing  Goal: Be free from injury by end of the shift  Outcome: Progressing  Goal: Verbalize understanding of personal risk factors for fall in the hospital  Outcome: Progressing  Goal: Verbalize understanding of risk factor reduction measures to prevent injury from fall in the home  Outcome: Progressing  Goal: Use assistive devices by end of the shift  Outcome: Progressing  Goal: Pace activities to prevent fatigue by end of the shift  Outcome: Progressing     Problem: Pain  Goal: Takes deep breaths with improved pain control throughout the shift  Outcome: Progressing  Goal: Turns in bed with improved pain control throughout the shift  Outcome: Progressing  Goal: Walks with improved pain control throughout the shift  Outcome: Progressing  Goal: Performs ADL's with improved pain control throughout shift  Outcome: Progressing  Goal: Participates in PT with improved pain control throughout the shift  Outcome: Progressing  Goal: Free from opioid side effects throughout the shift  Outcome: Progressing  Goal: Free from acute confusion related to pain meds throughout the shift  Outcome: Progressing

## 2024-11-05 NOTE — ANESTHESIA PROCEDURE NOTES
Airway  Date/Time: 11/5/2024 7:48 AM  Urgency: elective    Airway not difficult    Staffing  Performed: resident   Authorized by: Cullen Connelly MD    Performed by: Lexi White DO  Patient location during procedure: OR    Indications and Patient Condition  Indications for airway management: anesthesia  Spontaneous Ventilation: absent  Sedation level: deep  Preoxygenated: yes  Patient position: sniffing  Mask difficulty assessment: 1 - vent by mask  Planned trial extubation    Final Airway Details  Final airway type: endotracheal airway      Successful airway: ETT  Cuffed: yes   Successful intubation technique: direct laryngoscopy  Facilitating devices/methods: intubating stylet  Endotracheal tube insertion site: oral  Blade: Chris  Blade size: #3  ETT size (mm): 7.0  Cormack-Lehane Classification: grade I - full view of glottis  Placement verified by: capnometry   Measured from: teeth  ETT to teeth (cm): 21  Number of attempts at approach: 1

## 2024-11-05 NOTE — SIGNIFICANT EVENT
11/05/24 1612   Daily Screen   Total RSBI 55   Weaning Parameters   Weaning Vital Capacity 460 mL   Negative Inspiratory Force (NIF) -21   Spontaneous Minute Volume (MV) 7.12   Weaning Tidal Volume 364 mL   Respiratory Depth/Rhythm Regular

## 2024-11-05 NOTE — H&P
CTICU History & Physical    HPI:  Kiley Ward is a 69 y.o. with PMH/PSH of macular degeneration, HTN, HLD, ascending aortic aneurysm, Crocker's esophagus, GERD, previous DVT in 2019 (treated with Eliquis x 6 months and now off), breast CA s/p bilateral lumpectomy and chemo who was getting an outpatient routine CT calcium score screening and was incidentally found on imaging to have an ascending aortic aneurysm.      Now presents to the CTICU from the OR s/p Bentall's procedure, posterior pericardiotomy with Dr. Ramirez on 2025.      Cardiac Testin2024 TTE:  CONCLUSIONS:   1. Left ventricular systolic function is low normal with a 50-55% estimated ejection fraction.   2. Spectral Doppler shows an impaired relaxation pattern of left ventricular diastolic filling.   3. The left atrium is mild to moderately dilated.   4. Mildly elevated RVSP.   5. Mild aortic valve stenosis.   6. Mild aortic valve regurgitation.   7. There is moderate dilatation of the ascending aorta.    2024 [facility]:   CONCLUSIONS:   1. Minimal luminal irregularities in a right dominant system.    Intra-Op:  Out of OR Time (document on ventilator card): 1315     OR Course/Issues: N/A     CPB time: 6727-3786 am  Cross clamp time: 7209-4638 am  Circ arrest time: N/A   Echo Pre/Post: normal BiV  Chest Tubes/Drains: 2 mediastinal, L pleural   Temporary wires location/setting: V wires/ VVI 50      Fluids  Crystalloid: 1.5L  Colloid: N/A  Cellsaver: 1106 ml  Products: N/A  EBL: 250 ml  UOP: 835 ml     Anesthesia  Intubation: mac 3, grade 1  Intravenous Access: RIJ mac with mini, L brachial td   AICD: N/A  PPM: N/A  Regional anesthesia: N/A   Benzodiazepines: 2mg midazolam total  Opioids: 450mcg fentanyl and 15 mg methadone total  NMB: 120mg rocuronium total  TOF/ reversal given: N/A   Antibiotic time: cefazolin 2g 0832 1232  Temperature on admission to ICU: 36     PMH:   Past Medical History:   Diagnosis Date    ARMD (age related macular  degeneration)     Ascending aorta dilation (CMS-HCC)     Crocker's esophagus     Breast cancer (Multi) 2010    s/p lumpectomy and sentinel lymph node biopsy chemotherapy and radiation.    Clotting disorder (Multi)     DVT (deep venous thrombosis) (Multi) 2019    prior left upper extremity DVT treated with Eliquis for 6 months., unproked    GERD (gastroesophageal reflux disease)     Hx antineoplastic chemo     Hyperlipidemia     Hypertension     Hyperthyroidism     Murmur     Personal history of irradiation     PVD (posterior vitreous detachment), both eyes     Vitamin D deficiency        PSH:   Past Surgical History:   Procedure Laterality Date    BREAST LUMPECTOMY Bilateral     CARDIAC CATHETERIZATION N/A 09/27/2024    Procedure: Left Heart Cath with Coronary Angiography and LV;  Surgeon: Raul Muniz MD;  Location: [facility] Cardiac Cath Lab;  Service: Cardiovascular;  Laterality: N/A;    COLONOSCOPY      ESOPHAGOGASTRODUODENOSCOPY  08/2023     Social history:   Social History     Socioeconomic History    Marital status:      Spouse name: Not on file    Number of children: Not on file    Years of education: Not on file    Highest education level: Not on file   Occupational History    Not on file   Tobacco Use    Smoking status: Never    Smokeless tobacco: Never   Vaping Use    Vaping status: Never Used   Substance and Sexual Activity    Alcohol use: Yes     Alcohol/week: 4.0 standard drinks of alcohol     Types: 4 Glasses of wine per week    Drug use: Never    Sexual activity: Defer   Other Topics Concern    Not on file   Social History Narrative    Not on file     Social Drivers of Health     Financial Resource Strain: Patient Unable To Answer (11/5/2024)    Overall Financial Resource Strain (CARDIA)     Difficulty of Paying Living Expenses: Patient unable to answer   Food Insecurity: Patient Unable To Answer (11/5/2024)    Hunger Vital Sign     Worried About Running Out of Food in the Last Year: Patient  unable to answer     Ran Out of Food in the Last Year: Patient unable to answer   Transportation Needs: Patient Unable To Answer (11/5/2024)    PRAPARE - Transportation     Lack of Transportation (Medical): Patient unable to answer     Lack of Transportation (Non-Medical): Patient unable to answer   Physical Activity: Not on file   Stress: Patient Unable To Answer (11/5/2024)    [employer] [employer] - Occupational Stress Questionnaire     Feeling of Stress : Patient unable to answer   Social Connections: Patient Unable To Answer (11/5/2024)    Social Connection and Isolation Panel [NHANES]     Frequency of Communication with Friends and Family: Patient unable to answer     Frequency of Social Gatherings with Friends and Family: Patient unable to answer     Attends Adventism Services: Patient unable to answer     Active Member of Clubs or Organizations: Patient unable to answer     Attends Club or Organization Meetings: Patient unable to answer     Marital Status: Patient unable to answer   Intimate Partner Violence: Patient Unable To Answer (11/5/2024)    Humiliation, Afraid, Rape, and Kick questionnaire     Fear of Current or Ex-Partner: Patient unable to answer     Emotionally Abused: Patient unable to answer     Physically Abused: Patient unable to answer     Sexually Abused: Patient unable to answer   Housing Stability: Patient Unable To Answer (11/5/2024)    Housing Stability Vital Sign     Unable to Pay for Housing in the Last Year: Patient unable to answer     Number of Times Moved in the Last Year: 0     Homeless in the Last Year: Patient unable to answer      Family history:  Family History   Problem Relation Name Age of Onset    Breast cancer Mother      Prostate cancer Father      Breast cancer Sister      Glaucoma Neg Hx        Allergies: No Known Allergies    Home meds:   Medications Prior to Admission   Medication Sig Dispense Refill Last Dose/Taking    atorvastatin (Lipitor) 10 mg  tablet Take 1 tablet (10 mg) by mouth once daily at bedtime. 90 tablet 1 11/4/2024    calcitriol (Rocaltrol) 0.25 mcg capsule Take 1 capsule (0.25 mcg) by mouth once daily.   Past Week    carvedilol (Coreg) 6.25 mg tablet Take 1 tablet (6.25 mg) by mouth 2 times daily (morning and late afternoon). 180 tablet 3 11/5/2024 Morning    dexlansoprazole (Dexilant) 60 mg DR capsule Take 1 capsule (60 mg) by mouth once daily. 90 capsule 1 11/5/2024 Morning    ergocalciferol (Vitamin D-2) 1.25 MG (49375 UT) capsule 1 capsule (50,000 Units) every 14 (fourteen) days.   Past Week    famotidine (Pepcid) 40 mg tablet Take 1 tablet (40 mg) by mouth once daily.   11/4/2024    mv-min/FA/vit K/lutein/zeaxant (PRESERVISION AREDS 2 PLUS MV ORAL) Take 1 capsule by mouth once daily.   Past Week       Review of Systems:  ROS unable to be completed as patient currently intubated and sedated    Active Medications:   Scheduled medications  acetaminophen, 650 mg, oral, q6h  [START ON 11/6/2024] aspirin, 81 mg, oral, Daily  [START ON 11/6/2024] atorvastatin, 10 mg, oral, Nightly  ceFAZolin, 2 g, intravenous, q8h  famotidine, 40 mg, oral, Daily  lidocaine, 1 patch, transdermal, q24h  [START ON 11/6/2024] pantoprazole, 40 mg, oral, Daily before breakfast  polyethylene glycol, 17 g, oral, BID  sennosides-docusate sodium, 2 tablet, oral, BID      Continuous medications  dexmedeTOMIDine, 0-1.5 mcg/kg/hr (Adjusted), Last Rate: Stopped (11/05/24 1435)  lactated Ringer's, 5 mL/hr, Last Rate: 5 mL/hr (11/05/24 1500)  propofol, 0-50 mcg/kg/min, Last Rate: Stopped (11/05/24 1418)      PRN medications  PRN medications: calcium gluconate, calcium gluconate, hydrALAZINE, HYDROmorphone, magnesium sulfate, magnesium sulfate, naloxone, oxyCODONE, oxyCODONE, oxygen, potassium chloride CR **OR** potassium chloride, potassium chloride CR **OR** potassium chloride, potassium chloride, potassium chloride    Vitals:  Most Recent:  Vitals:    11/05/24 1531   BP:     Pulse: 54   Resp: 12   Temp:    SpO2: 100%       I/O:  No intake/output data recorded.    Physical Exam:   Constitutional: Intubated and sedated on mechanical ventilation in NAD   Neuro: Neuro intact without obvious focal deficits, on sedation.  PERRL  CV: RRR.  S1S2.  SR on monitor.  V wires set VVI 45  Pulm: CTAB on mechanical ventilation.  CT's with appropriate serosang output and no airleak.  : wahl in place with clear yellow urine   GI: S/ND/NT. Hypoactive BS. OGT in place with bilious output.  Extremities: NV exam intact x 4.  No edema.  Skin: WDI.  Postop dressings intact.  Chest tube dressings intact.    Psych: GATITO, sedated      All relevant labs, imaging, and diagnostics reviewed.     Assessment:  Kiley Ward is a 69 y.o. with PMH/PSH of macular degeneration, HTN, HLD, ascending aortic aneurysm, Crocker's esophagus, GERD, previous DVT in 2019 (treated with Eliquis x 6 months and now off), breast CA s/p bilateral lumpectomy and chemo who was getting an outpatient routine CT calcium score screening and was incidentally found on imaging to have an ascending aortic aneurysm.      Now presents to the CTICU from the OR s/p Cory's procedure, posterior pericardiotomy with Dr. Ramirez on 11/5/2025.    Plan:  NEURO:  Hx of macular degeneration.  Acute post op pain.  Patient is currently intubated and sedated on propofol infusion.  No obvious focal deficits.  -->   - Serial neuro and pain assessments   - Continue propofol until NMB reversal, then SAT per unit protocol but daily at minimum   - NMB reversal when normothermic and hemodynamically stable.    - Scheduled Tylenol   - PRN oxycodone and Dilaudid for pain   - Lidoderm patches x3 days   - PT/OT Consult, OOB to chair as tolerated, chair position if not tolerated   - CAM ICU score qshift.  Sleep/wake cycle hygiene     CV:  Hx of HTN, HLD, ascending aortic aneurysm.  Now s/p Bentall's, posterior pericardiotomy with James on 11/5. Pre & post cardiac  function: normal BiV pre & post.  V wires set to VVI 45,  SR on monitor.  Arrived to ICU on no vasoactives. -->  - Continuous EKG and ABP monitoring  - Titrate vasopressors to maintain goal MAP > 65  - Maintain pacer VVI 45 backup   - Start ASA tomorrow  - resume home statin tomorrow  - Hold home carvedilol    PULM:  No hx.  Currently intubated on ventilator with appropriate oxygenation and ventilation.  Chest tubes with no airleak and appropriate serosang output, to suction.  -->  - f/u post op CXR and daily CXR while in ICU  - Once NMB reversed begin CPAP trials and extubate when criteria met.    - Wean FiO2 maintaining SpO2 >92%.   - ABGs as needed  - Aggressive BPH, IS q1h and OOB to chair when extubated  - Maintain chest tubes to wall suction, notify team for high output per unit protocol    GI:  Hx of Crocker's esophagus and GERD. NPO with OGT in place, minimal bilious output.. -->  - Continue home PPI and Pepcid  - NPO, swallow eval 4 hours post extubation.    - Senna-Colace and miralax BID for bowel regimen post op    :  No hx, baseline Cr 0.73.  Arrival to ICU AVE risk score:  low. Wahl in place with appropriate UOP.  -->  - Continue wahl catheter for strict I/Os.    - Goal UOP 0.5ml/kg/hr  - RFP as clinically indicated.  Replete electrolytes per CTICU protocol.    ENDO: No hx, A1c 5.6.  Postoperative hyperglycemia well controlled with SSI. -->  - Maintain BG <180, SSI q4 per CTICU protocol    HEME:  Hx of DVT (2019) and breast CA s/p chemo/bilateral lumpectomy.  Acute blood loss anemia without active s/s of bleeding.   -->    - Monitor drain output volume and characteristics  - CBC, coags, and fibrinogen post op and as clinically indicated  - SCDs for DVT prophylaxis.  Hold SQH POD0 d/t increased risk of bleed.   - last type and screen: 11/5    ID:  MRSA negative.  Afebrile, no current indications of infection -->  - Trend temp q4h  - Periop Ancef x48hrs    Skin:   Postoperative dressings CDI without  s/s of infection.   - arrived to ICU from OR with preventative Mepilex dressings in place on sacrum and heels, to be changed per unit protocol and PRN  - every shift skin assessment per nursing and weekly ICU skin rounds  - moisture barrier to be applied with ramin care  - postoperative dressings removed/changed per protocol  - active skin problems addressed with nursing on daily rounds and wound care interventions in place as warranted    Proph:  SCDs  PPI    G:  Line  Right IJ MAC w mini MAC placed 11/5  Left brachial td placed 11/5  Ellison placed 11/5  PIVs    F: Family: will update family at bedside as able    A,B,C,D,E,F,G: reviewed    Restraints: The indications and risks/benefits of non-violent/non self-destructive restraints were discussed and bilateral soft wrist restraints warranted.    Dispo: CTICU     I have personally spent 45 minutes of critical care time, exclusive of time spent on any procedures, in evaluation and management of this critically ill patient’s condition as above.    Zeynep Hernandez, APRN-CNP  CTICU [id number]

## 2024-11-05 NOTE — H&P
CTICU History & Physical     HPI:  Kiley Ward is a 69 y.o. with PMH/PSH of macular degeneration, HTN, HLD, ascending aortic aneurysm, Crocker's esophagus, GERD, previous DVT in 2019 (treated with Eliquis x 6 months and now off), breast CA s/p bilateral lumpectomy and chemo who was getting an outpatient routine CT calcium score screening and was incidentally found on imaging to have an ascending aortic aneurysm.       Now presents to the CTICU from the OR s/p Bentall's procedure, posterior pericardiotomy with Dr. Ramirez on 2025.        Cardiac Testin2024 TTE:  CONCLUSIONS:   1. Left ventricular systolic function is low normal with a 50-55% estimated ejection fraction.   2. Spectral Doppler shows an impaired relaxation pattern of left ventricular diastolic filling.   3. The left atrium is mild to moderately dilated.   4. Mildly elevated RVSP.   5. Mild aortic valve stenosis.   6. Mild aortic valve regurgitation.   7. There is moderate dilatation of the ascending aorta.     2024 [facility]:   CONCLUSIONS:   1. Minimal luminal irregularities in a right dominant system.     Intra-Op:  Out of OR Time (document on ventilator card): 1315     OR Course/Issues: N/A     CPB time: 7248-7405 am  Cross clamp time: 2402-4459 am  Circ arrest time: N/A   Echo Pre/Post: normal BiV  Chest Tubes/Drains: 2 mediastinal, L pleural   Temporary wires location/setting: V wires/ VVI 50      Fluids  Crystalloid: 1.5L  Colloid: N/A  Cellsaver: 1106 ml  Products: N/A  EBL: 250 ml  UOP: 835 ml     Anesthesia  Intubation: mac 3, grade 1  Intravenous Access: RIJ mac with mini, L brachial td   AICD: N/A  PPM: N/A  Regional anesthesia: N/A   Benzodiazepines: 2mg midazolam total  Opioids: 450mcg fentanyl and 15 mg methadone total  NMB: 120mg rocuronium total  TOF/ reversal given: N/A   Antibiotic time: cefazolin 2g 0832 1232  Temperature on admission to ICU: 36      PMH:   Medical History        Past Medical History:   Diagnosis  Date    ARMD (age related macular degeneration)      Ascending aorta dilation (CMS-HCC)      Crocker's esophagus      Breast cancer (Multi) 2010     s/p lumpectomy and sentinel lymph node biopsy chemotherapy and radiation.    Clotting disorder (Multi)      DVT (deep venous thrombosis) (Multi) 2019     prior left upper extremity DVT treated with Eliquis for 6 months., unproked    GERD (gastroesophageal reflux disease)      Hx antineoplastic chemo      Hyperlipidemia      Hypertension      Hyperthyroidism      Murmur      Personal history of irradiation      PVD (posterior vitreous detachment), both eyes      Vitamin D deficiency              PSH:   Surgical History         Past Surgical History:   Procedure Laterality Date    BREAST LUMPECTOMY Bilateral      CARDIAC CATHETERIZATION N/A 09/27/2024     Procedure: Left Heart Cath with Coronary Angiography and LV;  Surgeon: Raul Muniz MD;  Location: [facility] Cardiac Cath Lab;  Service: Cardiovascular;  Laterality: N/A;    COLONOSCOPY        ESOPHAGOGASTRODUODENOSCOPY   08/2023         Social history:   Social History               Socioeconomic History    Marital status:        Spouse name: Not on file    Number of children: Not on file    Years of education: Not on file    Highest education level: Not on file   Occupational History    Not on file   Tobacco Use    Smoking status: Never    Smokeless tobacco: Never   Vaping Use    Vaping status: Never Used   Substance and Sexual Activity    Alcohol use: Yes       Alcohol/week: 4.0 standard drinks of alcohol       Types: 4 Glasses of wine per week    Drug use: Never    Sexual activity: Defer   Other Topics Concern    Not on file   Social History Narrative    Not on file      Social Drivers of Health           Financial Resource Strain: Patient Unable To Answer (11/5/2024)     Overall Financial Resource Strain (CARDIA)      Difficulty of Paying Living Expenses: Patient unable to answer   Food Insecurity: Patient  Unable To Answer (11/5/2024)     Hunger Vital Sign      Worried About Running Out of Food in the Last Year: Patient unable to answer      Ran Out of Food in the Last Year: Patient unable to answer   Transportation Needs: Patient Unable To Answer (11/5/2024)     PRAPARE - Transportation      Lack of Transportation (Medical): Patient unable to answer      Lack of Transportation (Non-Medical): Patient unable to answer   Physical Activity: Not on file   Stress: Patient Unable To Answer (11/5/2024)     [employer] - Occupational Stress Questionnaire      Feeling of Stress : Patient unable to answer   Social Connections: Patient Unable To Answer (11/5/2024)     Social Connection and Isolation Panel [NHANES]      Frequency of Communication with Friends and Family: Patient unable to answer      Frequency of Social Gatherings with Friends and Family: Patient unable to answer      Attends Zoroastrian Services: Patient unable to answer      Active Member of Clubs or Organizations: Patient unable to answer      Attends Club or Organization Meetings: Patient unable to answer      Marital Status: Patient unable to answer   Intimate Partner Violence: Patient Unable To Answer (11/5/2024)     Humiliation, Afraid, Rape, and Kick questionnaire      Fear of Current or Ex-Partner: Patient unable to answer      Emotionally Abused: Patient unable to answer      Physically Abused: Patient unable to answer      Sexually Abused: Patient unable to answer   Housing Stability: Patient Unable To Answer (11/5/2024)     Housing Stability Vital Sign      Unable to Pay for Housing in the Last Year: Patient unable to answer      Number of Times Moved in the Last Year: 0      Homeless in the Last Year: Patient unable to answer         Family history:  Family History          Family History   Problem Relation Name Age of Onset    Breast cancer Mother        Prostate cancer Father        Breast cancer Sister        Glaucoma  Neg Hx             Allergies:   RX Allergies   No Known Allergies        Home meds:   Prescriptions Prior to Admission           Medications Prior to Admission   Medication Sig Dispense Refill Last Dose/Taking    atorvastatin (Lipitor) 10 mg tablet Take 1 tablet (10 mg) by mouth once daily at bedtime. 90 tablet 1 11/4/2024    calcitriol (Rocaltrol) 0.25 mcg capsule Take 1 capsule (0.25 mcg) by mouth once daily.     Past Week    carvedilol (Coreg) 6.25 mg tablet Take 1 tablet (6.25 mg) by mouth 2 times daily (morning and late afternoon). 180 tablet 3 11/5/2024 Morning    dexlansoprazole (Dexilant) 60 mg DR capsule Take 1 capsule (60 mg) by mouth once daily. 90 capsule 1 11/5/2024 Morning    ergocalciferol (Vitamin D-2) 1.25 MG (19709 UT) capsule 1 capsule (50,000 Units) every 14 (fourteen) days.     Past Week    famotidine (Pepcid) 40 mg tablet Take 1 tablet (40 mg) by mouth once daily.     11/4/2024    mv-min/FA/vit K/lutein/zeaxant (PRESERVISION AREDS 2 PLUS MV ORAL) Take 1 capsule by mouth once daily.     Past Week            Review of Systems:  ROS unable to be completed as patient currently intubated and sedated     Active Medications:   Scheduled medications    Scheduled Medications   acetaminophen, 650 mg, oral, q6h  [START ON 11/6/2024] aspirin, 81 mg, oral, Daily  [START ON 11/6/2024] atorvastatin, 10 mg, oral, Nightly  ceFAZolin, 2 g, intravenous, q8h  famotidine, 40 mg, oral, Daily  lidocaine, 1 patch, transdermal, q24h  [START ON 11/6/2024] pantoprazole, 40 mg, oral, Daily before breakfast  polyethylene glycol, 17 g, oral, BID  sennosides-docusate sodium, 2 tablet, oral, BID         Continuous medications    Continuous Medications   dexmedeTOMIDine, 0-1.5 mcg/kg/hr (Adjusted), Last Rate: Stopped (11/05/24 1435)  lactated Ringer's, 5 mL/hr, Last Rate: 5 mL/hr (11/05/24 1500)  propofol, 0-50 mcg/kg/min, Last Rate: Stopped (11/05/24 1418)         PRN medications  PRN Medications   PRN medications: calcium  gluconate, calcium gluconate, hydrALAZINE, HYDROmorphone, magnesium sulfate, magnesium sulfate, naloxone, oxyCODONE, oxyCODONE, oxygen, potassium chloride CR **OR** potassium chloride, potassium chloride CR **OR** potassium chloride, potassium chloride, potassium chloride        Vitals:  Most Recent:      Vitals:     11/05/24 1531   BP:     Pulse: 54   Resp: 12   Temp:     SpO2: 100%         I/O:  No intake/output data recorded.     Physical Exam:   Constitutional: Intubated and sedated on mechanical ventilation in NAD   Neuro: Neuro intact without obvious focal deficits, on sedation.  PERRL  CV: RRR.  S1S2.  SR on monitor.  V wires set VVI 45  Pulm: CTAB on mechanical ventilation.  CT's with appropriate serosang output and no airleak.  : wahl in place with clear yellow urine   GI: S/ND/NT. Hypoactive BS. OGT in place with bilious output.  Extremities: NV exam intact x 4.  No edema.  Skin: WDI.  Postop dressings intact.  Chest tube dressings intact.    Psych: GATITO, sedated        All relevant labs, imaging, and diagnostics reviewed.      Assessment:  Kiley Ward is a 69 y.o. with PMH/PSH of macular degeneration, HTN, HLD, ascending aortic aneurysm, Crocker's esophagus, GERD, previous DVT in 2019 (treated with Eliquis x 6 months and now off), breast CA s/p bilateral lumpectomy and chemo who was getting an outpatient routine CT calcium score screening and was incidentally found on imaging to have an ascending aortic aneurysm.       Now presents to the CTICU from the OR s/p composite replacement of aortic root and ascending aorta with 2 23 mm Availnex aortic valve and 28 mm Gelweave graft, reimplantation of coronary arteries, and posterior pericardiotomy      Plan:  NEURO:  Hx of macular degeneration.  Acute post op pain.  Patient is currently intubated and sedated on propofol infusion.  No obvious focal deficits.  -->   - Serial neuro and pain assessments   - Continue propofol until NMB reversal, then SAT per  unit protocol but daily at minimum   - NMB reversal when normothermic and hemodynamically stable.    - Scheduled Tylenol   - PRN oxycodone and Dilaudid for pain   - Lidoderm patches x3 days   - PT/OT Consult, OOB to chair as tolerated, chair position if not tolerated   - CAM ICU score qshift.  Sleep/wake cycle hygiene      CV:  Hx of HTN, HLD, ascending aortic aneurysm.  Now s/p Bentall's, posterior pericardiotomy with Sabik on 11/5. Pre & post cardiac function: normal BiV pre & post.  V wires set to VVI 45,  SR on monitor.  Arrived to ICU on no vasoactives. -->  - Continuous EKG and ABP monitoring  - Titrate vasopressors to maintain goal MAP > 65  - Maintain pacer VVI 45 backup   - Start ASA tomorrow  - resume home statin tomorrow  - Hold home carvedilol     PULM:  No hx.  Currently intubated on ventilator with appropriate oxygenation and ventilation.  Chest tubes with no airleak and appropriate serosang output, to suction.  -->  - f/u post op CXR and daily CXR while in ICU  - Once NMB reversed begin CPAP trials and extubate when criteria met.    - Wean FiO2 maintaining SpO2 >92%.   - ABGs as needed  - Aggressive BPH, IS q1h and OOB to chair when extubated  - Maintain chest tubes to wall suction, notify team for high output per unit protocol     GI:  Hx of Crocker's esophagus and GERD. NPO with OGT in place, minimal bilious output.. -->  - Continue home PPI and Pepcid  - NPO, swallow eval 4 hours post extubation.    - Senna-Colace and miralax BID for bowel regimen post op     :  No hx, baseline Cr 0.73.  Arrival to ICU AVE risk score:  low. Wahl in place with appropriate UOP.  -->  - Continue wahl catheter for strict I/Os.    - Goal UOP 0.5ml/kg/hr  - RFP as clinically indicated.  Replete electrolytes per CTICU protocol.     ENDO: No hx, A1c 5.6.  Postoperative hyperglycemia well controlled with SSI. -->  - Maintain BG <180, SSI q4 per CTICU protocol     HEME:  Hx of DVT (2019) and breast CA s/p chemo/bilateral  lumpectomy.  Acute blood loss anemia without active s/s of bleeding.   -->    - Monitor drain output volume and characteristics  - CBC, coags, and fibrinogen post op and as clinically indicated  - SCDs for DVT prophylaxis.  Hold SQH POD0 d/t increased risk of bleed.   - last type and screen: 11/5     ID:  MRSA negative.  Afebrile, no current indications of infection -->  - Trend temp q4h  - Periop Ancef x48hrs     Skin:   Postoperative dressings CDI without s/s of infection.   - arrived to ICU from OR with preventative Mepilex dressings in place on sacrum and heels, to be changed per unit protocol and PRN  - every shift skin assessment per nursing and weekly ICU skin rounds  - moisture barrier to be applied with ramin care  - postoperative dressings removed/changed per protocol  - active skin problems addressed with nursing on daily rounds and wound care interventions in place as warranted     Proph:  SCDs  PPI     G:  Line  Right IJ MAC w mini MAC placed 11/5  Left brachial td placed 11/5  Ellison placed 11/5  PIVs     F: Family: will update family at bedside as able     A,B,C,D,E,F,G: reviewed     Restraints: The indications and risks/benefits of non-violent/non self-destructive restraints were discussed and bilateral soft wrist restraints warranted.     Dispo: CTICU       Mrs. Ward was seen and examined in the ICU. This includes review of the history, physical, ICU flow sheets, laboratory and radiographic data, medication administration record and other current clinical data. I have seen the patient independently and reviewed the plan with the house staff, advanced practice provider, nursing team, respiratory therapy, primary service, consultants, and patient and/or family members. This note serves to document my findings and time spent. My key findings, diagnoses and plans for ICU management today include the following:     ICU Diagnoses requiring active management:  - Acute postprocedural respiratory  insufficiency  - Anemia of blood loss  - Perioperative hyperglycemia  - Acute postoperative pain syndrome     Attending Recommendation:     Neuro: Hold sedation. Pain regimen per protocol   CV: S/p biobentall normal biventricular function.   Pulm: Intubated, SAT/SBT with goal of extubation  FEN/GI: PPI   Renal: AVE. Serial Cr. Strict I/O.   Heme: Serial cbc. CT dry. No acute c/f coagulopathy  ID: V/Z, follow up infectious eval for fever  Endo: SSI for BG control  MSK: PT/OT/SLP when extubated   TLD: maintain all lines, catheters  PPX: SQH, SCD, PPI  Dispo: ICU        Corina Jeffries MD.    Division of Critical Care Medicine  Department of Anesthesiology  Department of Cardiothoracic Anesthesiology         Patient's condition remains critical, requires frequent assessment and interventions, and/or is unstable with conditions that pose a significant threat to life or risk of prolonged impairment. There are 1 or more vital organ systems, documented above, involved with risk of life-threatening deterioration. I have spent: 35 minutes discontinuously at the bedside providing critical care. This time does not represent time-performing procedures.         Corina Jeffries MD

## 2024-11-05 NOTE — ANESTHESIA PROCEDURE NOTES
Central Venous Line:    Date/Time: 11/5/2024 8:00 AM    A central venous line was placed in the OR for the following indication(s): central venous access and CVP monitoring.  Staffing  Performed: resident   Authorized by: Cullen Connelly MD    Performed by: Lexi White DO    Sterility preparation included the following: provider hand hygiene performed prior to central venous catheter insertion, all 5 sterile barriers used (gloves, gown, cap, mask, large sterile drape) during central venous catheter insertion, antiseptic used during central venous catheter insertion and skin prep agent completely dried prior to procedure.  Medical reason for not performing maximal sterile barrier technique: no  The patient was placed in Trendelenburg position.    Right internal jugular vein was prepped.    The site was prepped with Chlorhexidine.  Size: 9 Fr (8 Fr)   Length: 11.5  Catheter type: introducer   Number of Lumens: triple lumen    This catheter was not an oximetric catheter.    During the procedure, the following specific steps were taken: target vein identified, needle advanced into vein and blood aspirated and guidewire advanced into vein.  Seldinger technique used.  Procedure performed using ultrasound guidance.  Sterile gel and probe cover used in ultrasound-guided central venous catheter insertion.    Intravenous verification was obtained by ultrasound, venous blood return, transducer and manometry.      Post insertion care included: all ports aspirated, all ports flushed easily, guidewire removed intact, Biopatch applied, line sutured in place and dressing applied.    During the procedure the patient experienced: patient tolerated procedure well with no complications.           images stored in chart

## 2024-11-05 NOTE — ANESTHESIA PROCEDURE NOTES
Arterial Line:    Date/Time: 11/5/2024 7:47 AM    Staffing  Performed: resident   Authorized by: Cullen Connelly MD    Performed by: Lexi White DO    An arterial line was placed. Procedure performed using ultrasound guidance.in the OR for the following indication(s): continuous blood pressure monitoring and blood sampling needed.    A 20 gauge (size), 12 cm (length), Arrow (type) catheter was placed into the Left brachial artery, secured by Tegaderm,   Seldinger technique used.  Events:  patient tolerated procedure well with no complications.

## 2024-11-06 ENCOUNTER — APPOINTMENT (OUTPATIENT)
Dept: RADIOLOGY | Facility: HOSPITAL | Age: 69
DRG: 220 | End: 2024-11-06
Payer: MEDICARE

## 2024-11-06 PROBLEM — Z98.890 S/P ASCENDING AORTIC ANEURYSM REPAIR: Status: ACTIVE | Noted: 2024-11-06

## 2024-11-06 PROBLEM — G89.18 ACUTE POSTOPERATIVE PAIN: Status: ACTIVE | Noted: 2024-11-06

## 2024-11-06 PROBLEM — D62 ABLA (ACUTE BLOOD LOSS ANEMIA): Status: ACTIVE | Noted: 2024-11-06

## 2024-11-06 PROBLEM — Z86.79 S/P ASCENDING AORTIC ANEURYSM REPAIR: Status: ACTIVE | Noted: 2024-11-06

## 2024-11-06 LAB
ALBUMIN SERPL BCP-MCNC: 4.1 G/DL (ref 3.4–5)
ANION GAP BLDA CALCULATED.4IONS-SCNC: 12 MMO/L (ref 10–25)
ANION GAP SERPL CALC-SCNC: 15 MMOL/L (ref 10–20)
BASE EXCESS BLDA CALC-SCNC: -0.7 MMOL/L (ref -2–3)
BODY TEMPERATURE: 37 DEGREES CELSIUS
BUN SERPL-MCNC: 14 MG/DL (ref 6–23)
CA-I BLD-SCNC: 1.1 MMOL/L (ref 1.1–1.33)
CA-I BLDA-SCNC: 1.14 MMOL/L (ref 1.1–1.33)
CALCIUM SERPL-MCNC: 8.2 MG/DL (ref 8.6–10.6)
CHLORIDE BLDA-SCNC: 108 MMOL/L (ref 98–107)
CHLORIDE SERPL-SCNC: 107 MMOL/L (ref 98–107)
CO2 SERPL-SCNC: 23 MMOL/L (ref 21–32)
CREAT SERPL-MCNC: 0.71 MG/DL (ref 0.5–1.05)
EGFRCR SERPLBLD CKD-EPI 2021: >90 ML/MIN/1.73M*2
ERYTHROCYTE [DISTWIDTH] IN BLOOD BY AUTOMATED COUNT: 12.9 % (ref 11.5–14.5)
GLUCOSE BLD MANUAL STRIP-MCNC: 156 MG/DL (ref 74–99)
GLUCOSE BLDA-MCNC: 173 MG/DL (ref 74–99)
GLUCOSE SERPL-MCNC: 166 MG/DL (ref 74–99)
HCO3 BLDA-SCNC: 23.4 MMOL/L (ref 22–26)
HCT VFR BLD AUTO: 34.1 % (ref 36–46)
HCT VFR BLD EST: 37 % (ref 36–46)
HGB BLD-MCNC: 11.8 G/DL (ref 12–16)
HGB BLDA-MCNC: 12.2 G/DL (ref 12–16)
INHALED O2 CONCENTRATION: 40 %
LACTATE BLDA-SCNC: 0.9 MMOL/L (ref 0.4–2)
MAGNESIUM SERPL-MCNC: 2.46 MG/DL (ref 1.6–2.4)
MCH RBC QN AUTO: 30.6 PG (ref 26–34)
MCHC RBC AUTO-ENTMCNC: 34.6 G/DL (ref 32–36)
MCV RBC AUTO: 88 FL (ref 80–100)
NRBC BLD-RTO: 0 /100 WBCS (ref 0–0)
OXYHGB MFR BLDA: 96.5 % (ref 94–98)
PCO2 BLDA: 36 MM HG (ref 38–42)
PH BLDA: 7.42 PH (ref 7.38–7.42)
PHOSPHATE SERPL-MCNC: 3.6 MG/DL (ref 2.5–4.9)
PLATELET # BLD AUTO: 165 X10*3/UL (ref 150–450)
PO2 BLDA: 96 MM HG (ref 85–95)
POTASSIUM BLDA-SCNC: 4 MMOL/L (ref 3.5–5.3)
POTASSIUM SERPL-SCNC: 3.9 MMOL/L (ref 3.5–5.3)
RBC # BLD AUTO: 3.86 X10*6/UL (ref 4–5.2)
SAO2 % BLDA: 99 % (ref 94–100)
SODIUM BLDA-SCNC: 139 MMOL/L (ref 136–145)
SODIUM SERPL-SCNC: 141 MMOL/L (ref 136–145)
WBC # BLD AUTO: 13.9 X10*3/UL (ref 4.4–11.3)

## 2024-11-06 PROCEDURE — 2500000002 HC RX 250 W HCPCS SELF ADMINISTERED DRUGS (ALT 637 FOR MEDICARE OP, ALT 636 FOR OP/ED)

## 2024-11-06 PROCEDURE — 82330 ASSAY OF CALCIUM: CPT | Performed by: NURSE PRACTITIONER

## 2024-11-06 PROCEDURE — 97161 PT EVAL LOW COMPLEX 20 MIN: CPT | Mod: GP

## 2024-11-06 PROCEDURE — 1100000001 HC PRIVATE ROOM DAILY

## 2024-11-06 PROCEDURE — 2500000001 HC RX 250 WO HCPCS SELF ADMINISTERED DRUGS (ALT 637 FOR MEDICARE OP): Performed by: NURSE PRACTITIONER

## 2024-11-06 PROCEDURE — 71045 X-RAY EXAM CHEST 1 VIEW: CPT | Performed by: RADIOLOGY

## 2024-11-06 PROCEDURE — 97530 THERAPEUTIC ACTIVITIES: CPT | Mod: GP

## 2024-11-06 PROCEDURE — 71045 X-RAY EXAM CHEST 1 VIEW: CPT

## 2024-11-06 PROCEDURE — 85027 COMPLETE CBC AUTOMATED: CPT | Performed by: NURSE PRACTITIONER

## 2024-11-06 PROCEDURE — 99233 SBSQ HOSP IP/OBS HIGH 50: CPT | Performed by: NURSE PRACTITIONER

## 2024-11-06 PROCEDURE — 2500000004 HC RX 250 GENERAL PHARMACY W/ HCPCS (ALT 636 FOR OP/ED): Performed by: NURSE PRACTITIONER

## 2024-11-06 PROCEDURE — 2500000002 HC RX 250 W HCPCS SELF ADMINISTERED DRUGS (ALT 637 FOR MEDICARE OP, ALT 636 FOR OP/ED): Performed by: NURSE PRACTITIONER

## 2024-11-06 PROCEDURE — 80069 RENAL FUNCTION PANEL: CPT | Performed by: NURSE PRACTITIONER

## 2024-11-06 PROCEDURE — 2500000004 HC RX 250 GENERAL PHARMACY W/ HCPCS (ALT 636 FOR OP/ED)

## 2024-11-06 PROCEDURE — 82435 ASSAY OF BLOOD CHLORIDE: CPT | Performed by: NURSE PRACTITIONER

## 2024-11-06 PROCEDURE — 83735 ASSAY OF MAGNESIUM: CPT | Performed by: NURSE PRACTITIONER

## 2024-11-06 PROCEDURE — 82947 ASSAY GLUCOSE BLOOD QUANT: CPT

## 2024-11-06 PROCEDURE — 37799 UNLISTED PX VASCULAR SURGERY: CPT | Performed by: NURSE PRACTITIONER

## 2024-11-06 RX ORDER — HEPARIN SODIUM 5000 [USP'U]/ML
5000 INJECTION, SOLUTION INTRAVENOUS; SUBCUTANEOUS EVERY 8 HOURS
Status: DISCONTINUED | OUTPATIENT
Start: 2024-11-06 | End: 2024-11-10 | Stop reason: HOSPADM

## 2024-11-06 RX ORDER — HYDROMORPHONE HYDROCHLORIDE 0.2 MG/ML
0.2 INJECTION INTRAMUSCULAR; INTRAVENOUS; SUBCUTANEOUS EVERY 4 HOURS PRN
Status: DISCONTINUED | OUTPATIENT
Start: 2024-11-06 | End: 2024-11-06

## 2024-11-06 RX ORDER — DEXTROSE 50 % IN WATER (D50W) INTRAVENOUS SYRINGE
25
Status: DISCONTINUED | OUTPATIENT
Start: 2024-11-06 | End: 2024-11-08

## 2024-11-06 RX ORDER — METOPROLOL TARTRATE 25 MG/1
12.5 TABLET, FILM COATED ORAL 2 TIMES DAILY
Status: DISCONTINUED | OUTPATIENT
Start: 2024-11-06 | End: 2024-11-09

## 2024-11-06 RX ORDER — METHOCARBAMOL 500 MG/1
500 TABLET, FILM COATED ORAL EVERY 6 HOURS SCHEDULED
Status: DISPENSED | OUTPATIENT
Start: 2024-11-06 | End: 2024-11-09

## 2024-11-06 RX ORDER — HYDRALAZINE HYDROCHLORIDE 20 MG/ML
10 INJECTION INTRAMUSCULAR; INTRAVENOUS EVERY 4 HOURS PRN
Status: DISCONTINUED | OUTPATIENT
Start: 2024-11-06 | End: 2024-11-08

## 2024-11-06 RX ORDER — INSULIN LISPRO 100 [IU]/ML
0-15 INJECTION, SOLUTION INTRAVENOUS; SUBCUTANEOUS
Status: DISCONTINUED | OUTPATIENT
Start: 2024-11-06 | End: 2024-11-06

## 2024-11-06 RX ORDER — ACETAMINOPHEN 325 MG/1
975 TABLET ORAL EVERY 6 HOURS
Status: DISCONTINUED | OUTPATIENT
Start: 2024-11-06 | End: 2024-11-08

## 2024-11-06 RX ORDER — DEXTROSE 50 % IN WATER (D50W) INTRAVENOUS SYRINGE
12.5
Status: DISCONTINUED | OUTPATIENT
Start: 2024-11-06 | End: 2024-11-08

## 2024-11-06 RX ADMIN — POTASSIUM CHLORIDE 20 MEQ: 1.5 POWDER, FOR SOLUTION ORAL at 03:38

## 2024-11-06 RX ADMIN — PROMETHAZINE HYDROCHLORIDE 6.25 MG: 25 INJECTION INTRAMUSCULAR; INTRAVENOUS at 00:39

## 2024-11-06 RX ADMIN — METOPROLOL TARTRATE 12.5 MG: 25 TABLET, FILM COATED ORAL at 19:40

## 2024-11-06 RX ADMIN — SENNOSIDES AND DOCUSATE SODIUM 2 TABLET: 50; 8.6 TABLET ORAL at 19:40

## 2024-11-06 RX ADMIN — OXYCODONE HYDROCHLORIDE 10 MG: 10 TABLET ORAL at 19:37

## 2024-11-06 RX ADMIN — HEPARIN SODIUM 5000 UNITS: 5000 INJECTION, SOLUTION INTRAVENOUS; SUBCUTANEOUS at 17:12

## 2024-11-06 RX ADMIN — HYDRALAZINE HYDROCHLORIDE 10 MG: 20 INJECTION INTRAMUSCULAR; INTRAVENOUS at 00:26

## 2024-11-06 RX ADMIN — HYDROMORPHONE HYDROCHLORIDE 0.2 MG: 1 INJECTION, SOLUTION INTRAMUSCULAR; INTRAVENOUS; SUBCUTANEOUS at 05:34

## 2024-11-06 RX ADMIN — POLYETHYLENE GLYCOL 3350 17 G: 17 POWDER, FOR SOLUTION ORAL at 09:37

## 2024-11-06 RX ADMIN — SODIUM CHLORIDE, SODIUM LACTATE, POTASSIUM CHLORIDE, AND CALCIUM CHLORIDE 500 ML: 600; 310; 30; 20 INJECTION, SOLUTION INTRAVENOUS at 06:36

## 2024-11-06 RX ADMIN — ACETAMINOPHEN 975 MG: 325 TABLET, FILM COATED ORAL at 09:00

## 2024-11-06 RX ADMIN — INSULIN LISPRO 3 UNITS: 100 INJECTION, SOLUTION INTRAVENOUS; SUBCUTANEOUS at 10:11

## 2024-11-06 RX ADMIN — CEFAZOLIN SODIUM 2 G: 2 INJECTION, SOLUTION INTRAVENOUS at 19:38

## 2024-11-06 RX ADMIN — METHOCARBAMOL 500 MG: 500 TABLET ORAL at 13:30

## 2024-11-06 RX ADMIN — POLYETHYLENE GLYCOL 3350 17 G: 17 POWDER, FOR SOLUTION ORAL at 19:40

## 2024-11-06 RX ADMIN — FAMOTIDINE 40 MG: 20 TABLET ORAL at 09:36

## 2024-11-06 RX ADMIN — PROMETHAZINE HYDROCHLORIDE 6.25 MG: 25 INJECTION INTRAMUSCULAR; INTRAVENOUS at 05:56

## 2024-11-06 RX ADMIN — PANTOPRAZOLE SODIUM 40 MG: 40 TABLET, DELAYED RELEASE ORAL at 06:25

## 2024-11-06 RX ADMIN — APREPITANT 40 MG: 40 CAPSULE ORAL at 00:26

## 2024-11-06 RX ADMIN — ATORVASTATIN CALCIUM 10 MG: 10 TABLET, FILM COATED ORAL at 20:23

## 2024-11-06 RX ADMIN — METOPROLOL TARTRATE 12.5 MG: 25 TABLET, FILM COATED ORAL at 09:38

## 2024-11-06 RX ADMIN — ASPIRIN 81 MG 81 MG: 81 TABLET ORAL at 09:37

## 2024-11-06 RX ADMIN — SENNOSIDES AND DOCUSATE SODIUM 2 TABLET: 50; 8.6 TABLET ORAL at 09:36

## 2024-11-06 RX ADMIN — HEPARIN SODIUM 5000 UNITS: 5000 INJECTION, SOLUTION INTRAVENOUS; SUBCUTANEOUS at 09:00

## 2024-11-06 RX ADMIN — METHOCARBAMOL 500 MG: 500 TABLET ORAL at 09:00

## 2024-11-06 RX ADMIN — METHOCARBAMOL 500 MG: 500 TABLET ORAL at 19:37

## 2024-11-06 RX ADMIN — ACETAMINOPHEN 650 MG: 325 TABLET, FILM COATED ORAL at 02:14

## 2024-11-06 RX ADMIN — CEFAZOLIN SODIUM 2 G: 2 INJECTION, SOLUTION INTRAVENOUS at 03:59

## 2024-11-06 RX ADMIN — HYDROMORPHONE HYDROCHLORIDE 0.4 MG: 1 INJECTION, SOLUTION INTRAMUSCULAR; INTRAVENOUS; SUBCUTANEOUS at 03:39

## 2024-11-06 RX ADMIN — SODIUM CHLORIDE 150 MG: 9 INJECTION, SOLUTION INTRAVENOUS at 09:35

## 2024-11-06 RX ADMIN — CEFAZOLIN SODIUM 2 G: 2 INJECTION, SOLUTION INTRAVENOUS at 13:28

## 2024-11-06 RX ADMIN — ACETAMINOPHEN 975 MG: 325 TABLET, FILM COATED ORAL at 13:30

## 2024-11-06 ASSESSMENT — COGNITIVE AND FUNCTIONAL STATUS - GENERAL
DRESSING REGULAR UPPER BODY CLOTHING: A LITTLE
TURNING FROM BACK TO SIDE WHILE IN FLAT BAD: A LITTLE
MOVING FROM LYING ON BACK TO SITTING ON SIDE OF FLAT BED WITH BEDRAILS: A LITTLE
STANDING UP FROM CHAIR USING ARMS: A LITTLE
CLIMB 3 TO 5 STEPS WITH RAILING: A LITTLE
MOBILITY SCORE: 18
HELP NEEDED FOR BATHING: A LITTLE
MOVING TO AND FROM BED TO CHAIR: A LITTLE
MOBILITY SCORE: 17
WALKING IN HOSPITAL ROOM: A LITTLE
CLIMB 3 TO 5 STEPS WITH RAILING: A LOT
MOVING TO AND FROM BED TO CHAIR: A LITTLE
DAILY ACTIVITIY SCORE: 19
WALKING IN HOSPITAL ROOM: A LITTLE
DRESSING REGULAR LOWER BODY CLOTHING: A LITTLE
STANDING UP FROM CHAIR USING ARMS: A LITTLE
TOILETING: A LITTLE
TURNING FROM BACK TO SIDE WHILE IN FLAT BAD: A LITTLE
PERSONAL GROOMING: A LITTLE
MOVING FROM LYING ON BACK TO SITTING ON SIDE OF FLAT BED WITH BEDRAILS: A LITTLE

## 2024-11-06 ASSESSMENT — PAIN SCALES - GENERAL
PAINLEVEL_OUTOF10: 2
PAINLEVEL_OUTOF10: 7
PAINLEVEL_OUTOF10: 3
PAINLEVEL_OUTOF10: 3
PAINLEVEL_OUTOF10: 8
PAINLEVEL_OUTOF10: 8
PAINLEVEL_OUTOF10: 0 - NO PAIN
PAINLEVEL_OUTOF10: 3
PAINLEVEL_OUTOF10: 5 - MODERATE PAIN
PAINLEVEL_OUTOF10: 3
PAINLEVEL_OUTOF10: 7

## 2024-11-06 ASSESSMENT — PAIN DESCRIPTION - LOCATION
LOCATION: CHEST

## 2024-11-06 ASSESSMENT — PAIN DESCRIPTION - ORIENTATION
ORIENTATION: MID

## 2024-11-06 ASSESSMENT — PAIN - FUNCTIONAL ASSESSMENT
PAIN_FUNCTIONAL_ASSESSMENT: 0-10

## 2024-11-06 ASSESSMENT — ACTIVITIES OF DAILY LIVING (ADL)
ADL_ASSISTANCE: INDEPENDENT
ADLS_ADDRESSED: NO

## 2024-11-06 NOTE — HOSPITAL COURSE
"Kiley Ward is a 69 y.o. with PMH/PSH of macular degeneration, HTN, HLD, ascending aortic aneurysm, Crocker's esophagus, GERD, previous DVT in 2019 (treated with Eliquis x 6 months and now off), breast CA s/p bilateral lumpectomy and chemo who was getting an outpatient routine CT calcium score screening and was incidentally found on imaging to have an ascending aortic aneurysm.       Now presents to the CTICU from the OR s/p Bentall's procedure, posterior pericardiotomy with Dr. Ramirez on 11/5/2025.    Operative Procedures Dr Ramirez on 11/5/24  Median sternotomy, composite replacement of aortic root and ascending aorta with 2 23 mm Availnex aortic valve and 28 mm Gelweave graft, reimplantation of coronary arteries, and posterior pericardiotomy    CTICU course   Transferred to 3 on 11/6  ==================  Floor Course:  - Patient was diuresed for fluid volume overload post cardiac surgery; Preop weight: Weight: 69.2 kg (152 lb 8.9 oz)kg    Vital signs and weight at discharge: /69 (BP Location: Left arm, Patient Position: Lying)   Pulse 68   Temp 36.5 °C (97.7 °F) (Temporal)   Resp 16   Ht 1.626 m (5' 4\")   Wt 69.2 kg (152 lb 8.9 oz)   SpO2 94%   BMI 26.19 kg/m²     - On ASA, statin, BB, by discharge  - Epicardial wires CUT on 11/10/24  - telemetry at discharge SR  Acute Blood Loss Anemia H&H stable at discharge HgB >10, Patient will not required Iron supplement at discharge  Hypertension -  home carvedilol 6.25 BID. Continue BB. Follow up  with PCP/ cardiologist for ongoing management  Hyperlipidemia: -continue statin, follow up lipid panel with PCP/ cardio as appropriate  Hx of Crocker's esophagus and GERD. PONV Cont Dexilant Home meds. Pantoprazole does not work for her.  Air under right diaphragm on serial CXRs since OR as above, abdominal exam benign with no pain to palpation and nondistended. -> Dr Ramirez was notified   Hx of DVT (2019) Not on any AC  currently   Hx of macular degeneration.  " Continue Falls precautions   OA - PT/OT . Hot and cold packs.  Acetaminophen , No NSAIDs for 3 months after cardiac surgery; if NSAIDs needed after 3 months, clear use with cardiologist before starting  [place]AR[place] reviewed on 11/8/24  Rx summary: 0 narcotics, 0 buprenorphine, 0 sedatives, 0 stimulants  Recent scripts:  No Rxs found     - 2v CXR done 11/8  - Postop echo done 11/8  - Cardiac rehab referral was placed  - PT recs home and low intensity therapy  - Anticipate discharge to home with homecare    Discharged on 11/10/24; POD # 5 s/p Bentall's, posterior pericardiotomy with Sabik on 11/5.     On day of discharge, vital signs were stable and no acute distress was noted. All questions were answered. After VS and labs were reviewed it was determined the patient was stable for discharge.   ==============  Hospital day of discharge management- spent >30 minutes coordinating the discharge and counseling/educating patient and family regarding discharge instructions.  =============    Past Medical History:   Diagnosis Date    ARMD (age related macular degeneration)      Ascending aorta dilation (CMS-HCC)      Crocker's esophagus      Breast cancer (Multi) 2010     s/p lumpectomy and sentinel lymph node biopsy chemotherapy and radiation.    Clotting disorder (Multi)      DVT (deep venous thrombosis) (Multi) 2019     prior left upper extremity DVT treated with Eliquis for 6 months., unproked    GERD (gastroesophageal reflux disease)      Hx antineoplastic chemo      Hyperlipidemia      Hypertension      Hyperthyroidism      Murmur      Personal history of irradiation      PVD (posterior vitreous detachment), both eyes      Vitamin D deficiency              PSH:   Surgical History         Past Surgical History:   Procedure Laterality Date    BREAST LUMPECTOMY Bilateral      CARDIAC CATHETERIZATION N/A 09/27/2024     Procedure: Left Heart Cath with Coronary Angiography and LV;  Surgeon: Raul Muniz MD;  Location: [facility]  Cardiac Cath Lab;  Service: Cardiovascular;  Laterality: N/A;    COLONOSCOPY        ESOPHAGOGASTRODUODENOSCOPY   08/2023         Social history:   Social History               Socioeconomic History    Marital status:        Spouse name: Not on file    Number of children: Not on file    Years of education: Not on file    Highest education level: Not on file   Occupational History    Not on file   Tobacco Use    Smoking status: Never    Smokeless tobacco: Never   Vaping Use    Vaping status: Never Used   Substance and Sexual Activity    Alcohol use: Yes       Alcohol/week: 4.0 standard drinks of alcohol       Types: 4 Glasses of wine per week    Drug use: Never    Sexual activity: Defer   Other Topics Concern    Not on file   Social History Narrative    Not on file      Social Drivers of Health           Financial Resource Strain: Patient Unable To Answer (11/5/2024)     Overall Financial Resource Strain (CARDIA)      Difficulty of Paying Living Expenses: Patient unable to answer   Food Insecurity: Patient Unable To Answer (11/5/2024)     Hunger Vital Sign      Worried About Running Out of Food in the Last Year: Patient unable to answer      Ran Out of Food in the Last Year: Patient unable to answer   Transportation Needs: Patient Unable To Answer (11/5/2024)     PRAPARE - Transportation      Lack of Transportation (Medical): Patient unable to answer      Lack of Transportation (Non-Medical): Patient unable to answer   Physical Activity: Not on file   Stress: Patient Unable To Answer (11/5/2024)     [employer] - Occupational Stress Questionnaire      Feeling of Stress : Patient unable to answer   Social Connections: Patient Unable To Answer (11/5/2024)     Social Connection and Isolation Panel [NHANES]      Frequency of Communication with Friends and Family: Patient unable to answer      Frequency of Social Gatherings with Friends and Family: Patient unable to answer      Attends  Shinto Services: Patient unable to answer      Active Member of Clubs or Organizations: Patient unable to answer      Attends Club or Organization Meetings: Patient unable to answer      Marital Status: Patient unable to answer   Intimate Partner Violence: Patient Unable To Answer (11/5/2024)     Humiliation, Afraid, Rape, and Kick questionnaire      Fear of Current or Ex-Partner: Patient unable to answer      Emotionally Abused: Patient unable to answer      Physically Abused: Patient unable to answer      Sexually Abused: Patient unable to answer   Housing Stability: Patient Unable To Answer (11/5/2024)     Housing Stability Vital Sign      Unable to Pay for Housing in the Last Year: Patient unable to answer      Number of Times Moved in the Last Year: 0      Homeless in the Last Year: Patient unable to answer         Family history:  Family History          Family History   Problem Relation Name Age of Onset    Breast cancer Mother        Prostate cancer Father        Breast cancer Sister        Glaucoma Neg Hx             Allergies:   RX Allergies   No Known Allergies        Home meds:   Prescriptions Prior to Admission           Medications Prior to Admission   Medication Sig Dispense Refill Last Dose/Taking    atorvastatin (Lipitor) 10 mg tablet Take 1 tablet (10 mg) by mouth once daily at bedtime. 90 tablet 1 11/4/2024    calcitriol (Rocaltrol) 0.25 mcg capsule Take 1 capsule (0.25 mcg) by mouth once daily.     Past Week    carvedilol (Coreg) 6.25 mg tablet Take 1 tablet (6.25 mg) by mouth 2 times daily (morning and late afternoon). 180 tablet 3 11/5/2024 Morning    dexlansoprazole (Dexilant) 60 mg DR capsule Take 1 capsule (60 mg) by mouth once daily. 90 capsule 1 11/5/2024 Morning    ergocalciferol (Vitamin D-2) 1.25 MG (94338 UT) capsule 1 capsule (50,000 Units) every 14 (fourteen) days.     Past Week    famotidine (Pepcid) 40 mg tablet Take 1 tablet (40 mg) by mouth once daily.     11/4/2024     mv-min/FA/vit K/lutein/zeaxant (PRESERVISION AREDS 2 PLUS MV ORAL) Take 1 capsule by mouth once daily.     Past Week

## 2024-11-06 NOTE — DISCHARGE INSTRUCTIONS
Don't forget to “Keep Your Move in the Tube!!”     Please refer to the “Move in the Tube” handout.  -- Load bearing activities can be completed if you are “staying in the tube.” If you are attempting load bearing activities, let pain be your guide with when trying an activity “out of the tube”. If an activity hurts or is uncomfortable go back to doing it while you stay “in the tube”. There is no time limit to “stay in the tube”.     -- Non-load bearing activities, which are your activities of daily living, can be completed with your arms “out of the tube” as long as you remain pain free. Some activities of daily living examples are dressing, personal care, showering, washing hair, and toilet hygiene.     Don't forget to KEEP YOUR MOVE IN THE TUBE and think of a RAFA Faustin dinosaur!    ============================                               **IMPORTANT**  Prevention of Infective Endocarditis (Heart Infections) After Cardiac Surgery:    Infective endocarditis occurs when bacteria enters the bloodstream and then attaches to the heart. Patients with a new heart valve, repaired heart valve, or graft used to repair/replace their aorta are at higher risk for developing this because of the prosthetic (man-made) medical material in their heart. Endocarditis is rare but when you undergo certain medical or dental procedures, as listed below, it can give bacteria an opportunity to enter the blood and cause this type of infection. To prevent this, preventative antibiotics taken before these procedures are required.     Antibiotics are always required PRIOR to the following:  - Dental work with gingival, periapical, or other gingival perforation (such as tooth extractions, dental abscess drainage, and dental cleanings)  - Respiratory procedures with incisions or biopsies   - Cardiac or vascular procedures to place or replace prosthetic material (such as heart valves, stents, etc.)    Antibiotics are required in the following  situations ONLY if an infection is already present:  - Skin or soft tissue procedures with infected tissue  - Gastrointestinal procedures with GI infections  - Urinary or genital procedure with acute genitourinary infections    Antibiotic examples you should expect to be prescribed:  - Amoxicillin or Ampicillin are usually the  first choice  - Cephalexin, Clindamycin, or Vancomycin may be used if you are unable to tolerate/allergic to Amoxicillin or Ampicillin  - Amoxicillin or Ampicillin (if allergic, use Vancomycin) will cover for enterococcus if you have a known acute biliary tract infection   - Specific antibiotics for a known organism should be used when treating an active infection    Please notify your dentist/primary physician/cardiologist PRIOR to these types of procedures to obtain the proper antibiotics and prevent a serious infection in your heart. When in doubt, always ask!   Additionally, good oral hygiene (routine brushing, flossing, and dental care) and prompt treatment of other infections (such as UTIs and skin infections) are equally as important to prevent endocarditis!!    ========================    Additional Post-Operative Instructions:  - Remember to use your Incentive spirometer 10x/hr while awake. Remember to cough and deep breath.  - No NSAIDs (common over-the-counter NSAIDs are ibuprofen/Motrin/Advil, naproxen/Naprosyn/Aleve) for 3 months after cardiac surgery; if NSAIDs needed after 3 months, clear use with cardiologist before starting.       Your home medications may have changed after surgery. Carefully compare this list with your prescription bottles at home and set aside any medications you are told to not take so you do not confuse them. Do not dispose of any medications until your follow-up, since your doctors may restart some at your follow-up appointments. It is important to bring a complete, current list of your medications to any medical appointments or hospitalizations.    For  any questions about your discharge, please call the cardiac surgery office at [phone]

## 2024-11-06 NOTE — PROGRESS NOTES
Physical Therapy    Physical Therapy Evaluation & Treatment    Patient Name: Kiley Ward  MRN: [id number]  Department: [facility] [place] [id number]  Room: [address] [place] [address]  Today's Date: 11/6/2024   Time Calculation  Start Time: 0937  Stop Time: 1015  Time Calculation (min): 38 min    Assessment/Plan   PT Assessment  PT Assessment Results: Decreased strength, Decreased endurance, Impaired balance, Decreased mobility, Pain  Rehab Prognosis: Good  Barriers to Discharge: Nausea, pain  Evaluation/Treatment Tolerance: Patient limited by pain (And nausea)  Medical Staff Made Aware: Yes  End of Session Communication: Bedside nurse  Assessment Comment: Pt demonstrated ability to complete bed mobility, sit<>stand transfer and ambulation with FWW.  CGA-minAx1 provided with mobility.  Limited activity d/t nausea and pain.  Vitals stable with mobility.  No instability noted.  End of Session Patient Position: Bed, 3 rail up, Alarm off, not on at start of session   IP OR SWING BED PT PLAN  Inpatient or Swing Bed: Inpatient  PT Plan  Treatment/Interventions: Bed mobility, Transfer training, Gait training, Stair training, Balance training, Strengthening, Endurance training, Therapeutic exercise, Therapeutic activity  PT Plan: Ongoing PT  PT Frequency: 3 times per week  PT Discharge Recommendations: Low intensity level of continued care  PT Recommended Transfer Status: Assist x1, Assistive device  PT - OK to Discharge: Yes    Subjective     General Visit Information:  General  Reason for Referral: Bentall's procedure, posterior pericardiotomy  Referred By: Dr. Ramirez  Past Medical History Relevant to Rehab: Macular degeneration, HTN, HLD, ascending aortic aneurysm, Crocker's esophagus, GERD, previous DVT in 2019 (treated with Eliquis x 6 months and now off), breast CA s/p bilateral lumpectomy and chemo  Missed Visit: No  Family/Caregiver Present: Yes  Caregiver Feedback:  present throughout session  Prior to Session Communication: Bedside  nurse  Patient Position Received: Up in chair, Alarm off, not on at start of session  Preferred Learning Style: auditory, verbal, visual, written  General Comment: Pt awake, alert and willing to participate in PT session  Home Living:  Home Living  Type of Home: House  Lives With: Spouse  Home Layout:  (2-3 KANA, bed/bath - 1st floor, walk in shower)  Prior Level of Function:  Prior Function Per Pt/Caregiver Report  Level of [place]: Independent with ADLs and functional transfers, Independent with homemaking with ambulation  ADL Assistance: Independent  Homemaking Assistance: Independent  Ambulatory Assistance: Independent  Vocational: Retired  Prior Function Comments: (+) drives, (-) falls  Precautions:  Precautions  Hearing/Visual Limitations: WFL  Medical Precautions: Cardiac precautions, Fall precautions, Oxygen therapy device and L/min, Chest tube  Post-Surgical Precautions: Move in the Tube  Precautions Comment: MAP >65, SpO2 >92%, VVI @ 45     11/06/24 0937 11/06/24 1015   Vital Signs   Vitals Session Pre PT Post PT   Heart Rate 70 73   Resp 17 21   SpO2 95 % 96 %   /59 103/53   MAP (mmHg) 76 72   BP Method Arterial line Arterial line   Patient Position Sitting Lying   Vital Signs Comment  --  MAP dropped to 65 upon standing however increased to 70 with activity (asymptomatic)     Objective   Pain:  Pain Assessment  Pain Assessment: 0-10  0-10 (Numeric) Pain Score: 7  Pain Type: Surgical pain  Pain Location: Chest  Cognition:  Cognition  Overall Cognitive Status: Within Functional Limits  Orientation Level: Oriented X4    General Assessments:  General Observation  General Observation: Tele, td, CVP, CVC, CT x2, wahl     Activity Tolerance  Endurance: Tolerates 10 - 20 min exercise with multiple rests  Early Mobility/Exercise Safety Screen: Proceed with mobilization - No exclusion criteria met  Activity Tolerance Comments: Limited mobility d/t nausea    Sensation  Light Touch:  (No deficits  reported post-op)    Strength  Strength Comments: At least 3/5 shown through functional mobility  Coordination  Movements are Fluid and Coordinated: Yes    Postural Control  Postural Control: Within Functional Limits    Static Sitting Balance  Static Sitting-Balance Support: Bilateral upper extremity supported, Feet supported  Static Sitting-Level of Assistance: Close supervision    Static Standing Balance  Static Standing-Balance Support: Bilateral upper extremity supported  Static Standing-Level of Assistance: Contact guard  Static Standing-Comment/Number of Minutes: FWW  Functional Assessments:  ADL  ADL's Addressed: No    Bed Mobility  Bed Mobility: Yes  Bed Mobility 1  Bed Mobility 1: Sitting to supine  Level of Assistance 1: Contact guard  Bed Mobility Comments 1: HOB slightly elevated, cues for hand placement/sequencing (bed rail utilized/log roll)  Bed Mobility 2  Bed Mobility  2:  (Boost towards HOB)  Level of Assistance 2: Dependent, +2  Bed Mobility Comments 2: HOB flat, draw sheet utilized    Transfers  Transfer: Yes  Transfer 1  Transfer From 1: Sit to, Stand to  Transfer to 1: Sit, Stand  Technique 1: Sit to stand, Stand to sit  Transfer Device 1: Walker  Transfer Level of Assistance 1:  (Sit>stand: minAx1;  Stand>sit: CGA)  Trials/Comments 1: Cues for hand placement and proper use of AD    Ambulation/Gait Training  Ambulation/Gait Training Performed: Yes  Ambulation/Gait Training 1  Surface 1: Level tile  Device 1: Rolling walker  Assistance 1: Contact guard  Comments/Distance (ft) 1: ~6ft forward/backwards (Limited distance d/t nausea)    Stairs  Stairs: No  Extremity/Trunk Assessments:  RUE   RUE : Within Functional Limits  LUE   LUE: Within Functional Limits  RLE   RLE : Within Functional Limits  LLE   LLE : Within Functional Limits  Treatments:  Therapeutic Exercise  Therapeutic Exercise Performed: Yes  Therapeutic Exercise Activity 1: 1x10 reps of incentive spirometer: </=500    Therapeutic  Activity  Therapeutic Activity Performed: Yes  Therapeutic Activity 1: Increased time for skilled ICU line/tube management for safe functional mobility  Therapeutic Activity 2: Education: MITT (handout provided and reviewed), use of AD, use of IS  Therapeutic Activity 3: x10 standing marches prior to ambulation for hemodynamic monitoring for safe mobility.  Pt ambulated ~6ft forward however required seated rest break d/t nausea.  Pt in chair ~2 min then completed additional sit>stand transfer and ambulated ~6ft back towards bed.  Outcome Measures:  [facility] Basic Mobility  Turning from your back to your side while in a flat bed without using bedrails: A little  Moving from lying on your back to sitting on the side of a flat bed without using bedrails: A little  Moving to and from bed to chair (including a wheelchair): A little  Standing up from a chair using your arms (e.g. wheelchair or bedside chair): A little  To walk in hospital room: A little  Climbing 3-5 steps with railing: A lot  Basic Mobility - Total Score: 17    FSS-ICU  Ambulation: Walks <50 feet with any assistance x1 or walks any distance with assistance x2 people  Rolling: Supervision or set-up only  Sitting: Supervision or set-up only  Transfer Sit-to-Stand: Minimal assistance (performs 75% or more of task)  Transfer Supine-to-Sit: Minimal assistance (performs 75% or more of task)  Total Score: 19    Early Mobility/Exercise Safety Screen: Proceed with mobilization - No exclusion criteria met     Encounter Problems       Encounter Problems (Active)       Balance       Pt will demonstrated ability to score at least 24/28 on the Tinetti balance assessment tool to ensure safety upon D/C.  (Progressing)       Start:  11/06/24    Expected End:  11/20/24               Mobility       Pt will demonstrated ability to ambulate >/=300ft with proper form and no balance deficits for safe home going.   (Progressing)       Start:  11/06/24    Expected End:  11/20/24             Pt will demonstrate ability to ascend/descend 3 stairs with unilateral rail and no balance deficits for safe home going.  (Progressing)       Start:  11/06/24    Expected End:  11/20/24               PT Transfers       Pt will demonstrate ability to complete 5X STS in < 12 sec with good from and consistency between transfers for safe D/C.  (Progressing)       Start:  11/06/24    Expected End:  11/20/24                   Education Documentation  Handouts, taught by Daniela Fenton PT at 11/6/2024  3:02 PM.  Learner: Patient  Readiness: Acceptance  Method: Explanation, Demonstration, Handout  Response: Demonstrated Understanding, Verbalizes Understanding    Precautions, taught by Daniela Fenton PT at 11/6/2024  3:02 PM.  Learner: Patient  Readiness: Acceptance  Method: Explanation, Demonstration, Handout  Response: Demonstrated Understanding, Verbalizes Understanding    Body Mechanics, taught by Daniela Fenton PT at 11/6/2024  3:02 PM.  Learner: Patient  Readiness: Acceptance  Method: Explanation, Demonstration, Handout  Response: Demonstrated Understanding, Verbalizes Understanding    Mobility Training, taught by Daniela Fenton PT at 11/6/2024  3:02 PM.  Learner: Patient  Readiness: Acceptance  Method: Explanation, Demonstration, Handout  Response: Demonstrated Understanding, Verbalizes Understanding    Education Comments  No comments found.

## 2024-11-06 NOTE — PROGRESS NOTES
11/06/24 1253   Discharge Planning   Living Arrangements Spouse/significant other   Support Systems Spouse/significant other   Type of Residence Private residence   Number of Stairs to Enter Residence 2   Number of Stairs Within Residence 5   Do you have animals or pets at home? Yes   Type of Animals or Pets 1 Dog   Who is requesting discharge planning? Provider   Home or Post Acute Services In home services   Expected Discharge Disposition Home H   Does the patient need discharge transport arranged? No     Met with patient to introduce myself, role and discuss discharge planning.  Patient lives with her spouse.  Patient is independent in all adl's.  Requires no assist devices for mobility.  Patient denies any recent falls.  Patient denies active home care, but understands that home acre is to be initiated post discharge.  Patient offered a list of home care agencies, but opt for [facility].  Patient denies any issues with making follow up appointments or getting his medications.  Patient expressed no questions and no social work concerns.  PCP:  KORTNEY Reed  Pharmacy:  [facility]  Home Care:  N/A  DME:  N/A

## 2024-11-06 NOTE — PROGRESS NOTES
CTICU Progress Note      Subjective   Overnight events:   NAOE    Scheduled Medications:   acetaminophen, 975 mg, oral, q6h  aspirin, 81 mg, oral, Daily  atorvastatin, 10 mg, oral, Nightly  ceFAZolin, 2 g, intravenous, q8h  famotidine, 40 mg, oral, Daily  heparin, 5,000 Units, subcutaneous, q8h  insulin lispro, 0-15 Units, subcutaneous, Before meals & nightly  lidocaine, 1 patch, transdermal, q24h  methocarbamol, 500 mg, oral, q6h GARY  metoprolol tartrate, 12.5 mg, oral, BID  pantoprazole, 40 mg, oral, Daily before breakfast  polyethylene glycol, 17 g, oral, BID  sennosides-docusate sodium, 2 tablet, oral, BID         Continuous Medications:         PRN Medications:   PRN medications: calcium gluconate, calcium gluconate, dextrose, dextrose, glucagon, glucagon, hydrALAZINE, HYDROmorphone, magnesium sulfate, magnesium sulfate, naloxone, ondansetron, oxyCODONE, oxyCODONE, oxygen, potassium chloride CR **OR** potassium chloride, potassium chloride CR **OR** potassium chloride, potassium chloride, potassium chloride    Objective   Vitals:  Most Recent:  Vitals:    11/06/24 1000   BP:    Pulse: 73   Resp: 15   Temp:    SpO2: 95%       24hr Min/Max:  Temp  Min: 35.8 °C (96.4 °F)  Max: 36.2 °C (97.2 °F)  Pulse  Min: 52  Max: 115  Resp  Min: 11  Max: 22  SpO2  Min: 94 %  Max: 100 %    I/O:  I/O last 2 completed shifts:  In: 6519.1 (94.2 mL/kg) [I.V.:53.1 (0.8 mL/kg); Blood:1106; IV Piggyback:4060]  Out: 5355 (77.4 mL/kg) [Urine:4575 (2.8 mL/kg/hr); Other:200; Chest Tube:580]  Weight: 69.2 kg     Hemodynamic parameters for last 24 hours:  CVP:  [3 mmHg-13 mmHg] 8 mmHg      Vent settings:  Vent Mode: Pressure support  FiO2 (%):  [40 %-50 %] 40 %  S RR:  [16] 16  S VT:  [430 mL] 430 mL  PEEP/CPAP (cm H2O):  [5 cm H20] 5 cm H20  CO SUP:  [5 cm H20] 5 cm H20  MAP (cm H2O):  [6.3-8] 6.3    Physical Exam:   Constitutional: NAD sitting in chair, pleasant and conversant   Neuro: Neuro exam intact without obvious focal deficit.  RASS  0 and CAM negative.  CV: RRR, SR 70's on monitor.  V wires placed to VVI 45   Pulm: CTAB on NC, chest tubes with no airleak and minimal serosang output to -20 suction  : wahl in place with kathie yellow urine  GI: S/ND/NT, +BS  Extremities: No edema, NV exam intact x4  Skin: Post op and chest tube dressings inact.  Psych: WNL    All relevant labs/diagnostics/imaging reviewed.    Assessment/Plan   Assessment:  Kiley Ward is a 69 y.o. with PMH/PSH of macular degeneration, HTN, HLD, ascending aortic aneurysm, Crocker's esophagus, GERD, previous DVT in 2019 (treated with Eliquis x 6 months and now off), breast CA s/p bilateral lumpectomy and chemo who was getting an outpatient routine CT calcium score screening and was incidentally found on imaging to have an ascending aortic aneurysm.       Now presents to the CTICU from the OR s/p Bentall's procedure, posterior pericardiotomy with Dr. Ramirez on 11/5/2025.     Plan:  NEURO:  Hx of macular degeneration.  Acute post op pain adequately controlled on current regimen.   Neuro exam intact without focal deficit and CAM negative.  Ambulated OOB to chair this AM.  -->   - Serial neuro and pain assessments   - Increase scheduled Tylenol to 975 mg q6   - start Robaxin 500 mg q6 x 3 days for multimodal pain control  - PRN oxycodone and Dilaudid for pain   - Lidoderm patches x3 days   - PT/OT Consult, OOB to chair as tolerated, chair position if not tolerated   - CAM ICU score qshift.  Sleep/wake cycle hygiene      CV:  Hx of HTN, HLD, ascending aortic aneurysm.  Now s/p Bentall's, posterior pericardiotomy with James on 11/5. Pre & post cardiac function: normal BiV pre & post.  V wires set to VVI 45,  SR on monitor.  Normotensive.  -->  - Continuous EKG monitoring  - goal MAP > 65  - Maintain pacer VVI 45 backup   - continue ASA 81 mg daily   - resume home statin   - start metop 12.5 mg BID with hold parameters  - PRN hydral for MAP > 90 or SBP > 140  - Hold home carvedilol      PULM:  No hx.  On NC with acceptable oxygenation and ventilation.  Chest tubes with no airleak and appropriate serosang output, to suction.  AM CXR stable with exception of slightly more air under right diaphragm - abdominal exam remains benign.   -->  - daily CXR   - Wean FiO2 maintaining SpO2 >92%.   - ABGs as needed  - Aggressive BPH, IS q1h and OOB to chair when extubated  - Maintain chest tubes to wall suction - ok to dc as criteria met     GI:  Hx of Crocker's esophagus and GERD. Passed bedside swallow and tolerating PO.  PONV improved with Emend IV this AM.  Air under right diaphragm on serial CXRs since OR as above, abdominal exam benign with no pain to palpation and nondistended.    -->  - Continue home PPI and Pepcid  - regular diet  - Senna-Colace and miralax BID for bowel regimen post op  - Dr. Ramirez notified of air under R diaphragm on CXR, follow up any other recs Dr. Ramirez has      :  No hx, baseline Cr 0.73.  Arrival to ICU AVE risk score:  low.  Cr stable and lytes appropriate.  Ellison in place with appropriate UOP.   Appears euvolemic on exam.  -->  - Goal UOP 0.5ml/kg/hr  - RFP as clinically indicated.  Replete electrolytes per CTICU protocol.    ENDO: No hx, A1c 5.6.  Postoperative hyperglycemia well controlled with SSI. -->  - Maintain BG <180, SSI 3 ACHS     HEME:  Hx of DVT (2019) and breast CA s/p chemo/bilateral lumpectomy.  Acute blood loss anemia without active s/s of bleeding and stable hgb.   -->    - Monitor drain output volume and characteristics  - CBC as clinically indicated  - SCDs and start SQH for DVT prophylaxis.    - last type and screen: 11/5     ID:  MRSA negative.  Afebrile with mild reactive leukocytosis.  -->  - Trend temp q4h  - Periop Ancef x48hrs     Skin:   Postoperative dressings CDI without s/s of infection.   - arrived to ICU from OR with preventative Mepilex dressings in place on sacrum and heels, to be changed per unit protocol and PRN  - every shift skin  assessment per nursing and weekly ICU skin rounds  - moisture barrier to be applied with ramin care  - postoperative dressings removed/changed per protocol  - active skin problems addressed with nursing on daily rounds and wound care interventions in place as warranted     Proph:  SCDs  Home PPI  SQH     G:  Line  Right IJ MAC w mini MAC placed 11/5 - dc today  Left brachial td placed 11/5 - dc today  Ellison placed 11/5 - dc today  PIVs     Daily Risk Screen:  Intubated? no  Central line? Yes, dc today  Ellison? Yes, dc today    F: Family:  updated at bedside this morning     A,B,C,D,E,F,G: reviewed     Restraints: none    Dispo: cleared for T3     Zeynep Hernandez, APRN-CNP  CTICU [id number]

## 2024-11-06 NOTE — PROGRESS NOTES
"CARDIAC SURGERY DAILY PROGRESS NOTE      Kiley Ward is a 69 y.o. with PMH/PSH of macular degeneration, HTN, HLD, ascending aortic aneurysm, Crocker's esophagus, GERD, previous DVT in 2019 (treated with Eliquis x 6 months and now off), breast CA s/p bilateral lumpectomy and chemo who was getting an outpatient routine CT calcium score screening and was incidentally found on imaging to have an ascending aortic aneurysm.       Now presents to the CTICU from the OR s/p Bentall's procedure, posterior pericardiotomy with Dr. Ramirez on 11/5/2025.    Operative Procedures Dr Ramirez on 11/5/24  Median sternotomy, composite replacement of aortic root and ascending aorta with 2 23 mm Availnex aortic valve and 28 mm Gelweave graft, reimplantation of coronary arteries, and posterior pericardiotomy    CTICU course   Transferred to Lt3 on 11/6  ==================    Interval History:   Transferred to Lt3 on 11/6    SUBJECTIVE:      Objective   /69 (BP Location: Left arm, Patient Position: Lying)   Pulse 68   Temp 36.5 °C (97.7 °F) (Temporal)   Resp 16   Ht 1.626 m (5' 4\")   Wt 69.2 kg (152 lb 8.9 oz)   SpO2 94%   BMI 26.19 kg/m²   0-10 (Numeric) Pain Score: 0 - No pain  Critical-Care Pain Observation Score:  [0-6]    3 Day Weight Change: Unable to Calculate    Intake and Output    Intake/Output Summary (Last 24 hours) at 11/6/2024 1319  Last data filed at 11/6/2024 1000  Gross per 24 hour   Intake 1969.79 ml   Output 2565 ml   Net -595.21 ml       Physical Exam  Physical Exam  Vitals and nursing note reviewed.           Medications  Scheduled medications  acetaminophen, 975 mg, oral, q6h  aspirin, 81 mg, oral, Daily  atorvastatin, 10 mg, oral, Nightly  ceFAZolin, 2 g, intravenous, q8h  famotidine, 40 mg, oral, Daily  heparin, 5,000 Units, subcutaneous, q8h  insulin lispro, 0-15 Units, subcutaneous, Before meals & nightly  lidocaine, 1 patch, transdermal, q24h  methocarbamol, 500 mg, oral, q6h GARY  metoprolol tartrate, " 12.5 mg, oral, BID  pantoprazole, 40 mg, oral, Daily before breakfast  polyethylene glycol, 17 g, oral, BID  sennosides-docusate sodium, 2 tablet, oral, BID    Continuous medications   PRN medications  PRN medications: dextrose, dextrose, glucagon, hydrALAZINE, naloxone, ondansetron, oxyCODONE, oxyCODONE, oxygen    Labs  Results for orders placed or performed during the hospital encounter of 11/05/24 (from the past 24 hours)   Calcium, Ionized   Result Value Ref Range    POCT Calcium, Ionized 1.08 (L) 1.1 - 1.33 mmol/L   Magnesium   Result Value Ref Range    Magnesium 2.97 (H) 1.60 - 2.40 mg/dL   Coagulation Screen   Result Value Ref Range    Protime 13.3 (H) 9.8 - 12.8 seconds    INR 1.2 (H) 0.9 - 1.1    aPTT 25 (L) 27 - 38 seconds   Fibrinogen   Result Value Ref Range    Fibrinogen 183 (L) 200 - 400 mg/dL   CBC   Result Value Ref Range    WBC 12.4 (H) 4.4 - 11.3 x10*3/uL    nRBC 0.0 0.0 - 0.0 /100 WBCs    RBC 3.81 (L) 4.00 - 5.20 x10*6/uL    Hemoglobin 11.8 (L) 12.0 - 16.0 g/dL    Hematocrit 33.6 (L) 36.0 - 46.0 %    MCV 88 80 - 100 fL    MCH 31.0 26.0 - 34.0 pg    MCHC 35.1 32.0 - 36.0 g/dL    RDW 12.7 11.5 - 14.5 %    Platelets 163 150 - 450 x10*3/uL   Renal Function Panel   Result Value Ref Range    Glucose 164 (H) 74 - 99 mg/dL    Sodium 142 136 - 145 mmol/L    Potassium 4.3 3.5 - 5.3 mmol/L    Chloride 110 (H) 98 - 107 mmol/L    Bicarbonate 25 21 - 32 mmol/L    Anion Gap 11 10 - 20 mmol/L    Urea Nitrogen 14 6 - 23 mg/dL    Creatinine 0.73 0.50 - 1.05 mg/dL    eGFR 90 >60 mL/min/1.73m*2    Calcium 7.8 (L) 8.6 - 10.6 mg/dL    Phosphorus 2.6 2.5 - 4.9 mg/dL    Albumin 3.3 (L) 3.4 - 5.0 g/dL   Type And Screen   Result Value Ref Range    ABO TYPE O     Rh TYPE POS     ANTIBODY SCREEN NEG    Blood Gas Arterial Full Panel   Result Value Ref Range    POCT pH, Arterial 7.39 7.38 - 7.42 pH    POCT pCO2, Arterial 37 (L) 38 - 42 mm Hg    POCT pO2, Arterial 134 (H) 85 - 95 mm Hg    POCT SO2, Arterial 99 94 - 100 %     POCT Oxy Hemoglobin, Arterial 97.4 94.0 - 98.0 %    POCT Hematocrit Calculated, Arterial 37.0 36.0 - 46.0 %    POCT Sodium, Arterial 139 136 - 145 mmol/L    POCT Potassium, Arterial 4.3 3.5 - 5.3 mmol/L    POCT Chloride, Arterial 111 (H) 98 - 107 mmol/L    POCT Ionized Calcium, Arterial 1.16 1.10 - 1.33 mmol/L    POCT Glucose, Arterial 169 (H) 74 - 99 mg/dL    POCT Lactate, Arterial 0.7 0.4 - 2.0 mmol/L    POCT Base Excess, Arterial -2.2 (L) -2.0 - 3.0 mmol/L    POCT HCO3 Calculated, Arterial 22.4 22.0 - 26.0 mmol/L    POCT Hemoglobin, Arterial 12.2 12.0 - 16.0 g/dL    POCT Anion Gap, Arterial 10 10 - 25 mmo/L    Patient Temperature 37.0 degrees Celsius    FiO2 50 %   Blood Gas Arterial Full Panel   Result Value Ref Range    POCT pH, Arterial 7.38 7.38 - 7.42 pH    POCT pCO2, Arterial 37 (L) 38 - 42 mm Hg    POCT pO2, Arterial 145 (H) 85 - 95 mm Hg    POCT SO2, Arterial 100 94 - 100 %    POCT Oxy Hemoglobin, Arterial 97.3 94.0 - 98.0 %    POCT Hematocrit Calculated, Arterial 38.0 36.0 - 46.0 %    POCT Sodium, Arterial 138 136 - 145 mmol/L    POCT Potassium, Arterial 4.0 3.5 - 5.3 mmol/L    POCT Chloride, Arterial 107 98 - 107 mmol/L    POCT Ionized Calcium, Arterial 1.07 (L) 1.10 - 1.33 mmol/L    POCT Glucose, Arterial 186 (H) 74 - 99 mg/dL    POCT Lactate, Arterial 0.7 0.4 - 2.0 mmol/L    POCT Base Excess, Arterial -2.8 (L) -2.0 - 3.0 mmol/L    POCT HCO3 Calculated, Arterial 21.9 (L) 22.0 - 26.0 mmol/L    POCT Hemoglobin, Arterial 12.5 12.0 - 16.0 g/dL    POCT Anion Gap, Arterial 13 10 - 25 mmo/L    Patient Temperature 37.0 degrees Celsius    FiO2 40 %    Ventilator Mode CPAP    Blood Gas Arterial Full Panel   Result Value Ref Range    POCT pH, Arterial 7.37 (L) 7.38 - 7.42 pH    POCT pCO2, Arterial 39 38 - 42 mm Hg    POCT pO2, Arterial 123 (H) 85 - 95 mm Hg    POCT SO2, Arterial 100 94 - 100 %    POCT Oxy Hemoglobin, Arterial 97.3 94.0 - 98.0 %    POCT Hematocrit Calculated, Arterial 37.0 36.0 - 46.0 %    POCT  Sodium, Arterial 137 136 - 145 mmol/L    POCT Potassium, Arterial 3.9 3.5 - 5.3 mmol/L    POCT Chloride, Arterial 108 (H) 98 - 107 mmol/L    POCT Ionized Calcium, Arterial 1.05 (L) 1.10 - 1.33 mmol/L    POCT Glucose, Arterial 186 (H) 74 - 99 mg/dL    POCT Lactate, Arterial 0.7 0.4 - 2.0 mmol/L    POCT Base Excess, Arterial -2.5 (L) -2.0 - 3.0 mmol/L    POCT HCO3 Calculated, Arterial 22.5 22.0 - 26.0 mmol/L    POCT Hemoglobin, Arterial 12.4 12.0 - 16.0 g/dL    POCT Anion Gap, Arterial 10 10 - 25 mmo/L    Patient Temperature 37.0 degrees Celsius    FiO2 40 %   Blood Gas Arterial Full Panel   Result Value Ref Range    POCT pH, Arterial 7.38 7.38 - 7.42 pH    POCT pCO2, Arterial 39 38 - 42 mm Hg    POCT pO2, Arterial 122 (H) 85 - 95 mm Hg    POCT SO2, Arterial 99 94 - 100 %    POCT Oxy Hemoglobin, Arterial 97.4 94.0 - 98.0 %    POCT Hematocrit Calculated, Arterial 37.0 36.0 - 46.0 %    POCT Sodium, Arterial 137 136 - 145 mmol/L    POCT Potassium, Arterial 3.9 3.5 - 5.3 mmol/L    POCT Chloride, Arterial 111 (H) 98 - 107 mmol/L    POCT Ionized Calcium, Arterial 1.22 1.10 - 1.33 mmol/L    POCT Glucose, Arterial 193 (H) 74 - 99 mg/dL    POCT Lactate, Arterial 0.7 0.4 - 2.0 mmol/L    POCT Base Excess, Arterial -1.8 -2.0 - 3.0 mmol/L    POCT HCO3 Calculated, Arterial 23.1 22.0 - 26.0 mmol/L    POCT Hemoglobin, Arterial 12.2 12.0 - 16.0 g/dL    POCT Anion Gap, Arterial 7 (L) 10 - 25 mmo/L    Patient Temperature 37.0 degrees Celsius    FiO2 36 %   Blood Gas Arterial Full Panel   Result Value Ref Range    POCT pH, Arterial 7.39 7.38 - 7.42 pH    POCT pCO2, Arterial 37 (L) 38 - 42 mm Hg    POCT pO2, Arterial 123 (H) 85 - 95 mm Hg    POCT SO2, Arterial 100 94 - 100 %    POCT Oxy Hemoglobin, Arterial 97.4 94.0 - 98.0 %    POCT Hematocrit Calculated, Arterial 36.0 36.0 - 46.0 %    POCT Sodium, Arterial 138 136 - 145 mmol/L    POCT Potassium, Arterial 3.9 3.5 - 5.3 mmol/L    POCT Chloride, Arterial 111 (H) 98 - 107 mmol/L    POCT  Ionized Calcium, Arterial 1.12 1.10 - 1.33 mmol/L    POCT Glucose, Arterial 181 (H) 74 - 99 mg/dL    POCT Lactate, Arterial 0.7 0.4 - 2.0 mmol/L    POCT Base Excess, Arterial -2.2 (L) -2.0 - 3.0 mmol/L    POCT HCO3 Calculated, Arterial 22.4 22.0 - 26.0 mmol/L    POCT Hemoglobin, Arterial 12.0 12.0 - 16.0 g/dL    POCT Anion Gap, Arterial 9 (L) 10 - 25 mmo/L    Patient Temperature 37.0 degrees Celsius    FiO2 31 %   Calcium, Ionized   Result Value Ref Range    POCT Calcium, Ionized 1.10 1.1 - 1.33 mmol/L   Magnesium   Result Value Ref Range    Magnesium 2.46 (H) 1.60 - 2.40 mg/dL   CBC   Result Value Ref Range    WBC 13.9 (H) 4.4 - 11.3 x10*3/uL    nRBC 0.0 0.0 - 0.0 /100 WBCs    RBC 3.86 (L) 4.00 - 5.20 x10*6/uL    Hemoglobin 11.8 (L) 12.0 - 16.0 g/dL    Hematocrit 34.1 (L) 36.0 - 46.0 %    MCV 88 80 - 100 fL    MCH 30.6 26.0 - 34.0 pg    MCHC 34.6 32.0 - 36.0 g/dL    RDW 12.9 11.5 - 14.5 %    Platelets 165 150 - 450 x10*3/uL   Renal Function Panel   Result Value Ref Range    Glucose 166 (H) 74 - 99 mg/dL    Sodium 141 136 - 145 mmol/L    Potassium 3.9 3.5 - 5.3 mmol/L    Chloride 107 98 - 107 mmol/L    Bicarbonate 23 21 - 32 mmol/L    Anion Gap 15 10 - 20 mmol/L    Urea Nitrogen 14 6 - 23 mg/dL    Creatinine 0.71 0.50 - 1.05 mg/dL    eGFR >90 >60 mL/min/1.73m*2    Calcium 8.2 (L) 8.6 - 10.6 mg/dL    Phosphorus 3.6 2.5 - 4.9 mg/dL    Albumin 4.1 3.4 - 5.0 g/dL   Blood Gas Arterial Full Panel   Result Value Ref Range    POCT pH, Arterial 7.42 7.38 - 7.42 pH    POCT pCO2, Arterial 36 (L) 38 - 42 mm Hg    POCT pO2, Arterial 96 (H) 85 - 95 mm Hg    POCT SO2, Arterial 99 94 - 100 %    POCT Oxy Hemoglobin, Arterial 96.5 94.0 - 98.0 %    POCT Hematocrit Calculated, Arterial 37.0 36.0 - 46.0 %    POCT Sodium, Arterial 139 136 - 145 mmol/L    POCT Potassium, Arterial 4.0 3.5 - 5.3 mmol/L    POCT Chloride, Arterial 108 (H) 98 - 107 mmol/L    POCT Ionized Calcium, Arterial 1.14 1.10 - 1.33 mmol/L    POCT Glucose, Arterial 173  (H) 74 - 99 mg/dL    POCT Lactate, Arterial 0.9 0.4 - 2.0 mmol/L    POCT Base Excess, Arterial -0.7 -2.0 - 3.0 mmol/L    POCT HCO3 Calculated, Arterial 23.4 22.0 - 26.0 mmol/L    POCT Hemoglobin, Arterial 12.2 12.0 - 16.0 g/dL    POCT Anion Gap, Arterial 12 10 - 25 mmo/L    Patient Temperature 37.0 degrees Celsius    FiO2 40 %   POCT GLUCOSE   Result Value Ref Range    POCT Glucose 156 (H) 74 - 99 mg/dL         IMAGING/ DIAGNOSTIC TESTING:  I have personally reviewed the following test result(s):    XR CHEST 1 VIEW;  11/5/2024 2:47 pm      FINDINGS:  AP radiograph of the chest was provided.      The patient is leftward rotated.      Interval demonstration of median sternotomy and aortic valve  replacement/repair. Endotracheal tube tip projects 5.3 cm from the  rosa. Enteric tube tip is beyond the field of view. Right internal  jugular central venous catheter with tip projecting over the superior  vena cava. There are 3 left chest tubes.      CARDIOMEDIASTINAL SILHOUETTE:  Cardiomediastinal silhouette is stable in size and configuration.      LUNGS:  Mild perihilar and bibasilar predominant interstitial opacities,  increased as compared to prior. The right costophrenic angle is  clear. There is no new focal consolidation. The evaluation of the  left costophrenic angle is limited by leftward patient rotation and  obscuration of the cardiomediastinal silhouette. There is no  definitive pneumothorax.      ABDOMEN:  Thin hyperlucency under the right hemidiaphragm.      BONES:  No acute osseous changes.      IMPRESSION:  1. Pneumoperitoneum beneath the right diaphragm  2. Mild pulmonary interstitial edema, increased as compared to prior.      IMPRESSION & PLAN:  POD # 1 s/p Bentall's, posterior pericardiotomy with Sabik on 11/5.   ascending aortic aneurysm.   - Increase activity/ ambulation; PT/OT  - Encourage IS, C/DB; respiratory therapy; wean O2 as lubna   - Cardiac rehab referral   - Continue cardiac meds: ASA, BB,  statin  - Pain and anticonstipation meds  - 2v CXR On POD #3  - Postop echo   - Remove epicardial wires prior to discharge   - Tele until discharge  - Optimize nutrition and electrolytes    Rhythm  - Tele:   - Continue metoprolol 12.5 mg BID   - epicardial wires  VVI at 45 bpm   - Adjust medications as tolerated    Acute Blood Loss Anemia   Recent Labs     24  0015 24  1341 10/17/24  1103 24  0925 10/18/22  0903 21  0856   HGB 11.8* 11.8* 12.4 12.9 13.2 12.9   HCT 34.1* 33.6* 38.3 40.0 40.2 40.1   - MV, PO Iron x1mo  - Daily labs, transfuse as indicated    Thrombocytopenia  Recent Labs     24  0015 24  1341 10/17/24  1103 24  0925 10/18/22  0903 21  0856    163 296 314 301 279   - Etiology likely postop/CPB related  - Continue to trend with daily CBCs    Volume/Electrolyte Status: Preop wt Weight: 69.2 kg (152 lb 8.9 oz)   Vitals:    24 0617   Weight: 69.2 kg (152 lb 8.9 oz)     - Weight: 69.2 Kg   - Adjust diuresis as needed for postop cardiac surgery hypervolemia  - Replete electrolytes for hypokalemia/hypomagnesemia/hypophosphatemia as needed   - Daily weights and strict I&Os  - Daily RFP while admitted    Leukocytosis  Recent Labs     24  0015 24  1341 10/17/24  1103 24  0925 10/18/22  0903 21  0856   WBC 13.9* 12.4* 4.2* 5.4 4.8 5.0     Temp (36hrs), Av.2 °C (97.1 °F), Min:35.8 °C (96.4 °F), Max:36.5 °C (97.7 °F)  - MRSA negative.  Afebrile with mild reactive leukocytosis.   - aggressive pulmonary hygiene  - monitor for s/s infection  - likely atelectasis/ postoperative in etiology  - daily CBC to follow    Hypertension  Systolic (24hrs), Av , Min:101 , Max:108    - continue BB  - Hold home carvedilol   -Consider ACEis/ ARBS once bp stable    Hyperlipidemia:   Lab Results   Component Value Date    CHOL 177 2024    HDL 59.4 2024    VLDL 15 2024    TRIG 74 2024    NHDL 118 2024   -continue  statin  -follow up lipid panel with PCP/ cardio as appropriate    Hx of Crocker's esophagus and GERD. PONV -   - PONV improved with Emend IV this AM.   - Passed bedside swallow and tolerating PO   - Air under right diaphragm on serial CXRs since OR as above, abdominal exam benign with no pain to palpation and nondistended. -> Dr Ramirez was notified   - Cont  Dexilant  Home meds. Pantoprazole  does not work for her   - Avoid taking pills on an empty stomach  - Eat small frequent meals  - eat sitting up in the chair   - avoid acidic foods    Hx of DVT (2019) Not on any AC  currently   - OOB   - PT /OT   - Continue DVT  prophylaxis     breast CA s/p chemo/bilateral lumpectomy.   - No blood pressures or blood draws  at the site of the lumpectomy     Hx of macular degeneration.   - Falls precautions     OA   - PT/OT   - Hot and cold packs  - Acetaminophen   - No NSAIDs for 3 months after cardiac surgery; if NSAIDs needed after 3 months, clear use with cardiologist before starting      VTE Prophylaxis: SCDs/TEDs, ambulation, SQ heparin  Code Status: Full Code    Dispo  - PT/OT recs Home   - Would benefit from homecare RN for cardiac surgery carepath  - Anticipate discharge NOT  ready   - Will continue to assess discharge needs      YOSVANY Daniel-CNP  Cardiac Surgery [facility]  [facility]  Team Phone [phone]    11/6/2024  1:19 PM

## 2024-11-07 ENCOUNTER — APPOINTMENT (OUTPATIENT)
Dept: RADIOLOGY | Facility: HOSPITAL | Age: 69
DRG: 220 | End: 2024-11-07
Payer: MEDICARE

## 2024-11-07 LAB
BLOOD EXPIRATION DATE: NORMAL
DISPENSE STATUS: NORMAL
GLUCOSE BLD MANUAL STRIP-MCNC: 125 MG/DL (ref 74–99)
GLUCOSE BLD MANUAL STRIP-MCNC: 127 MG/DL (ref 74–99)
GLUCOSE BLD MANUAL STRIP-MCNC: 134 MG/DL (ref 74–99)
GLUCOSE BLD MANUAL STRIP-MCNC: 144 MG/DL (ref 74–99)
GLUCOSE BLD MANUAL STRIP-MCNC: 147 MG/DL (ref 74–99)
PRODUCT BLOOD TYPE: 5100
PRODUCT CODE: NORMAL
UNIT ABO: NORMAL
UNIT NUMBER: NORMAL
UNIT RH: NORMAL
UNIT VOLUME: 275
UNIT VOLUME: 287
UNIT VOLUME: 350
UNIT VOLUME: 350
XM INTEP: NORMAL

## 2024-11-07 PROCEDURE — 97530 THERAPEUTIC ACTIVITIES: CPT | Mod: GO

## 2024-11-07 PROCEDURE — 82947 ASSAY GLUCOSE BLOOD QUANT: CPT

## 2024-11-07 PROCEDURE — 2500000005 HC RX 250 GENERAL PHARMACY W/O HCPCS

## 2024-11-07 PROCEDURE — 71045 X-RAY EXAM CHEST 1 VIEW: CPT

## 2024-11-07 PROCEDURE — 2500000004 HC RX 250 GENERAL PHARMACY W/ HCPCS (ALT 636 FOR OP/ED)

## 2024-11-07 PROCEDURE — 2500000001 HC RX 250 WO HCPCS SELF ADMINISTERED DRUGS (ALT 637 FOR MEDICARE OP)

## 2024-11-07 PROCEDURE — 1200000002 HC GENERAL ROOM WITH TELEMETRY DAILY

## 2024-11-07 PROCEDURE — 71045 X-RAY EXAM CHEST 1 VIEW: CPT | Performed by: RADIOLOGY

## 2024-11-07 PROCEDURE — 97165 OT EVAL LOW COMPLEX 30 MIN: CPT | Mod: GO

## 2024-11-07 PROCEDURE — 99232 SBSQ HOSP IP/OBS MODERATE 35: CPT

## 2024-11-07 PROCEDURE — 2500000004 HC RX 250 GENERAL PHARMACY W/ HCPCS (ALT 636 FOR OP/ED): Mod: JZ | Performed by: NURSE PRACTITIONER

## 2024-11-07 PROCEDURE — 2500000001 HC RX 250 WO HCPCS SELF ADMINISTERED DRUGS (ALT 637 FOR MEDICARE OP): Performed by: NURSE PRACTITIONER

## 2024-11-07 RX ORDER — OXYCODONE HYDROCHLORIDE 5 MG/1
2.5 TABLET ORAL EVERY 4 HOURS PRN
Status: DISCONTINUED | OUTPATIENT
Start: 2024-11-07 | End: 2024-11-09

## 2024-11-07 RX ORDER — FUROSEMIDE 10 MG/ML
20 INJECTION INTRAMUSCULAR; INTRAVENOUS ONCE
Status: COMPLETED | OUTPATIENT
Start: 2024-11-07 | End: 2024-11-07

## 2024-11-07 RX ORDER — OXYCODONE HYDROCHLORIDE 5 MG/1
5 TABLET ORAL EVERY 4 HOURS PRN
Status: DISCONTINUED | OUTPATIENT
Start: 2024-11-07 | End: 2024-11-09

## 2024-11-07 RX ADMIN — HEPARIN SODIUM 5000 UNITS: 5000 INJECTION, SOLUTION INTRAVENOUS; SUBCUTANEOUS at 01:53

## 2024-11-07 RX ADMIN — HEPARIN SODIUM 5000 UNITS: 5000 INJECTION, SOLUTION INTRAVENOUS; SUBCUTANEOUS at 08:51

## 2024-11-07 RX ADMIN — LIDOCAINE 1 PATCH: 4 PATCH TOPICAL at 15:26

## 2024-11-07 RX ADMIN — FAMOTIDINE 40 MG: 20 TABLET ORAL at 08:50

## 2024-11-07 RX ADMIN — SENNOSIDES AND DOCUSATE SODIUM 2 TABLET: 50; 8.6 TABLET ORAL at 20:52

## 2024-11-07 RX ADMIN — ATORVASTATIN CALCIUM 10 MG: 10 TABLET, FILM COATED ORAL at 20:52

## 2024-11-07 RX ADMIN — OXYCODONE HYDROCHLORIDE 5 MG: 5 TABLET ORAL at 08:56

## 2024-11-07 RX ADMIN — ASPIRIN 81 MG 81 MG: 81 TABLET ORAL at 08:50

## 2024-11-07 RX ADMIN — ACETAMINOPHEN 975 MG: 325 TABLET, FILM COATED ORAL at 20:52

## 2024-11-07 RX ADMIN — ACETAMINOPHEN 975 MG: 325 TABLET, FILM COATED ORAL at 15:28

## 2024-11-07 RX ADMIN — OXYCODONE HYDROCHLORIDE 5 MG: 5 TABLET ORAL at 20:52

## 2024-11-07 RX ADMIN — POLYETHYLENE GLYCOL 3350 17 G: 17 POWDER, FOR SOLUTION ORAL at 08:48

## 2024-11-07 RX ADMIN — ACETAMINOPHEN 975 MG: 325 TABLET, FILM COATED ORAL at 08:49

## 2024-11-07 RX ADMIN — SENNOSIDES AND DOCUSATE SODIUM 2 TABLET: 50; 8.6 TABLET ORAL at 08:50

## 2024-11-07 RX ADMIN — FUROSEMIDE 20 MG: 10 INJECTION, SOLUTION INTRAVENOUS at 09:09

## 2024-11-07 RX ADMIN — CEFAZOLIN SODIUM 2 G: 2 INJECTION, SOLUTION INTRAVENOUS at 04:48

## 2024-11-07 RX ADMIN — CEFAZOLIN SODIUM 2 G: 2 INJECTION, SOLUTION INTRAVENOUS at 12:50

## 2024-11-07 RX ADMIN — METHOCARBAMOL 500 MG: 500 TABLET ORAL at 18:45

## 2024-11-07 RX ADMIN — Medication 1 SPRAY: at 20:53

## 2024-11-07 RX ADMIN — POLYETHYLENE GLYCOL 3350 17 G: 17 POWDER, FOR SOLUTION ORAL at 20:53

## 2024-11-07 RX ADMIN — ACETAMINOPHEN 975 MG: 325 TABLET, FILM COATED ORAL at 01:53

## 2024-11-07 RX ADMIN — HEPARIN SODIUM 5000 UNITS: 5000 INJECTION, SOLUTION INTRAVENOUS; SUBCUTANEOUS at 15:29

## 2024-11-07 RX ADMIN — METOPROLOL TARTRATE 12.5 MG: 25 TABLET, FILM COATED ORAL at 08:50

## 2024-11-07 RX ADMIN — PANTOPRAZOLE SODIUM 40 MG: 40 TABLET, DELAYED RELEASE ORAL at 05:14

## 2024-11-07 RX ADMIN — METOPROLOL TARTRATE 12.5 MG: 25 TABLET, FILM COATED ORAL at 20:52

## 2024-11-07 RX ADMIN — METHOCARBAMOL 500 MG: 500 TABLET ORAL at 01:53

## 2024-11-07 ASSESSMENT — COGNITIVE AND FUNCTIONAL STATUS - GENERAL
MOVING FROM LYING ON BACK TO SITTING ON SIDE OF FLAT BED WITH BEDRAILS: A LITTLE
PERSONAL GROOMING: A LITTLE
WALKING IN HOSPITAL ROOM: A LITTLE
DAILY ACTIVITIY SCORE: 18
TOILETING: A LITTLE
CLIMB 3 TO 5 STEPS WITH RAILING: A LITTLE
TURNING FROM BACK TO SIDE WHILE IN FLAT BAD: A LITTLE
CLIMB 3 TO 5 STEPS WITH RAILING: A LITTLE
HELP NEEDED FOR BATHING: A LOT
WALKING IN HOSPITAL ROOM: A LITTLE
PERSONAL GROOMING: A LITTLE
DAILY ACTIVITIY SCORE: 19
TOILETING: A LITTLE
TURNING FROM BACK TO SIDE WHILE IN FLAT BAD: A LITTLE
STANDING UP FROM CHAIR USING ARMS: A LITTLE
PERSONAL GROOMING: A LITTLE
MOVING TO AND FROM BED TO CHAIR: A LITTLE
STANDING UP FROM CHAIR USING ARMS: A LITTLE
TOILETING: A LITTLE
HELP NEEDED FOR BATHING: A LITTLE
MOVING FROM LYING ON BACK TO SITTING ON SIDE OF FLAT BED WITH BEDRAILS: A LITTLE
DRESSING REGULAR LOWER BODY CLOTHING: A LITTLE
MOBILITY SCORE: 18
HELP NEEDED FOR BATHING: A LITTLE
MOVING TO AND FROM BED TO CHAIR: A LITTLE
DRESSING REGULAR UPPER BODY CLOTHING: A LITTLE
DRESSING REGULAR UPPER BODY CLOTHING: A LITTLE
DRESSING REGULAR LOWER BODY CLOTHING: A LITTLE
DAILY ACTIVITIY SCORE: 19
DRESSING REGULAR LOWER BODY CLOTHING: A LITTLE
MOBILITY SCORE: 18
DRESSING REGULAR UPPER BODY CLOTHING: A LITTLE

## 2024-11-07 ASSESSMENT — PAIN DESCRIPTION - DESCRIPTORS: DESCRIPTORS: ACHING;SORE

## 2024-11-07 ASSESSMENT — PAIN - FUNCTIONAL ASSESSMENT
PAIN_FUNCTIONAL_ASSESSMENT: 0-10

## 2024-11-07 ASSESSMENT — PAIN SCALES - WONG BAKER
WONGBAKER_NUMERICALRESPONSE: HURTS LITTLE BIT
WONGBAKER_NUMERICALRESPONSE: HURTS LITTLE BIT
WONGBAKER_NUMERICALRESPONSE: HURTS LITTLE MORE

## 2024-11-07 ASSESSMENT — PAIN SCALES - GENERAL
PAINLEVEL_OUTOF10: 7
PAINLEVEL_OUTOF10: 6
PAINLEVEL_OUTOF10: 5 - MODERATE PAIN
PAINLEVEL_OUTOF10: 7
PAINLEVEL_OUTOF10: 0 - NO PAIN

## 2024-11-07 ASSESSMENT — PAIN DESCRIPTION - ORIENTATION
ORIENTATION: MID
ORIENTATION: MID
ORIENTATION: INNER

## 2024-11-07 ASSESSMENT — PAIN DESCRIPTION - LOCATION
LOCATION: CHEST
LOCATION: CHEST
LOCATION: THROAT

## 2024-11-07 ASSESSMENT — ACTIVITIES OF DAILY LIVING (ADL)
ADL_ASSISTANCE: INDEPENDENT
BATHING_ASSISTANCE: MODERATE

## 2024-11-07 NOTE — PROGRESS NOTES
Occupational Therapy    Evaluation and Treatment    Patient Name: Kiley Ward  MRN: [id number]  Today's Date: 11/7/2024  Room: [address] [place], [place] [address]  Time Calculation  Start Time: 1014  Stop Time: 1057  Time Calculation (min): 43 min    Assessment  IP OT Assessment  OT Assessment: Pt presents w/ new onset of orthostaic hypotention, decreased activity tolerance, mobility, and balance resulting in deficits in ADLs and the need for continued skilled OT services at Low intensity to allow for a safe and functional d/c.  Prognosis: Good  Barriers to Discharge: None  Evaluation/Treatment Tolerance: Treatment limited secondary to medical complications (Comment)  Medical Staff Made Aware: Yes  End of Session Communication: Bedside nurse  End of Session Patient Position: Bed, 2 rail up, Alarm off, caregiver present  Plan:  Inpatient Plan  Treatment Interventions: ADL retraining, Functional transfer training, Endurance training, Neuromuscular reeducation, Compensatory technique education, Equipment evaluation/education  OT Frequency: 3 times per week  OT Discharge Recommendations: Low intensity level of continued care  Equipment Recommended upon Discharge:  (TBD)  OT Recommended Transfer Status: Assist of 1  OT - OK to Discharge: Yes (when deemed medically appropriate)  OT Assessment  OT Assessment Results: Decreased ADL status, Decreased endurance, Decreased functional mobility, Decreased IADLs  Prognosis: Good  Barriers to Discharge: None  Evaluation/Treatment Tolerance: Treatment limited secondary to medical complications (Comment)  Medical Staff Made Aware: Yes  Strengths: Ability to acquire knowledge, Access to adaptive/assistive products, Attitude of self, Coping skills, Housing layout, Support of Caregivers, Support of extended family/friends  Barriers to Participation: Comorbidities    Subjective   Current Problem:  1. Ascending aortic aneurysm, unspecified whether ruptured (CMS-[facility])  CBC    Renal function panel    Referral to  Home Health    Magnesium    Referral to Cardiology    Referral to Primary Care      2. Aneurysm of ascending aorta without rupture (CMS-HCC)  Anesthesia Intraoperative Transesophageal Echocardiogram    Anesthesia Intraoperative Transesophageal Echocardiogram    Surgical Pathology Exam    Surgical Pathology Exam      3. Nonrheumatic aortic (valve) insufficiency  Anesthesia Intraoperative Transesophageal Echocardiogram      4. S/P AVR (aortic valve replacement) and aortoplasty  CBC    Renal function panel    Referral to Home Health    Magnesium    Referral to Cardiology    Referral to Primary Care    Transthoracic Echo (TTE) Complete    Transthoracic Echo (TTE) Complete      5. S/P aortic aneurysm repair  CBC    Renal function panel    Referral to Home Health    Magnesium    Referral to Cardiology    Referral to Primary Care        General:  Reason for Referral: This 70 y/o female w/ hx of ascending aortic aneurysm w/ trileaflet aortic valve presents s/p Bentall's procedure, posterior pericardiotomy w/ Dr. Ramirez 11/5.  Past Medical History Relevant to Rehab: ARMD, breast CA (2010) s/p bilateral lumpectomy and chemo, DVT (2019), GERD, HLD, HTN, hyperthyroidism, PVD, T2DM, Vit D deficiency  Prior to Session Communication: Bedside nurse  Patient Position Received: Bed, 3 rail up, Alarm off, not on at start of session  Family/Caregiver Present: Yes  Caregiver Feedback:  present and supportive throughout session  General Comment: Pt semi sup in bed on approach. Pleasant and agreeable to OT assessment   Precautions:  Medical Precautions: Cardiac precautions  Post-Surgical Precautions: Move in the Tube  Precautions Comment: Orthostatic Hypotention, MAP >65, SpO2 >92%, VVI @ 45  Vital Signs:  Vital Signs (Past 2hrs)        Date/Time Vitals Session Patient Position Pulse Resp SpO2 BP MAP (mmHg)    11/07/24 1014 During OT  Sitting  73  --  --  88/54  65     11/07/24 1015 During OT  Sitting  69  --  --  91/55  67      11/07/24 1016 During OT  Sitting  --  --  --  81/54  --     11/07/24 1017 During OT  Lying  --  --  --  94/60  71     11/07/24 1018 During OT  Lying  --  --  --  88/59  68                   Pain:  Pain Assessment  Pain Assessment: 0-10  0-10 (Numeric) Pain Score: 0 - No pain  Lines/Tubes/Drains:  Pacer Wires (Active)   Number of days: 2         Objective   Cognition:  Overall Cognitive Status: Within Functional Limits  Orientation Level: Oriented X4  Insight: Within function limits  Impulsive: Within functional limits  Home Living:  Type of Home: House  Lives With: Spouse  Home Adaptive Equipment: Cane  Home Layout: One level, Able to live on main level with bedroom/bathroom  Home Access: Stairs to enter without rails  Entrance Stairs-Rails: None  Entrance Stairs-Number of Steps: 2  Bathroom Shower/Tub: Walk-in shower  Bathroom Toilet: Handicapped height  Bathroom Equipment: Grab bars in shower, Built-in shower seat, Hand-held shower hose   Prior Function:  Level of [place]: Independent with ADLs and functional transfers, Independent with homemaking with ambulation  Receives Help From: Family  ADL Assistance: Independent  Homemaking Assistance: Independent  Ambulatory Assistance: Independent (w/o AD)  Vocational: Retired (Teacher)  Hand Dominance: Right  IADL History:  Current License: Yes  Mode of Transportation: Car  ADL:  Eating Assistance: Independent  Grooming Assistance: Minimal  Bathing Assistance: Moderate  UE Dressing Assistance: Minimal  LE Dressing Assistance: Minimal  Toileting Assistance with Device: Minimal  Activity Tolerance:  Endurance: Tolerates 10 - 20 min exercise with multiple rests  Balance:  Static Sitting Balance  Static Sitting-Balance Support: Feet supported  Static Sitting-Level of Assistance: Modified Independent  Static Standing Balance  Static Standing-Balance Support: Bilateral upper extremity supported  Static Standing-Level of Assistance: Contact guard  Bed Mobility/Transfers:  Bed Mobility/Transfers: Bed Mobility  Bed Mobility: Yes  Bed Mobility 1  Bed Mobility 1: Sitting to supine, Supine to sitting  Level of Assistance 1: Contact guard  Bed Mobility Comments 1: HOB elevated, verbal cues to maintian MITT, assist for line management  Bed Mobility 2  Bed Mobility  2: Scooting (towards HOB)  Level of Assistance 2: Moderate assistance  Functional Mobility  Functional Mobility Performed: Yes  Functional Mobility 1  Surface 1: Level tile  Device 1: Rolling walker  Assistance 1: Contact guard  Quality of Functional Mobility 1: Inconsistent stride length  Comments 1: Min household distance ~30 ft   and Transfers  Transfer: Yes  Transfer 1  Transfer From 1: Sit to  Transfer to 1: Stand  Technique 1: Sit to stand, Stand to sit  Transfer Device 1: Walker  Transfer Level of Assistance 1: Contact guard  Trials/Comments 1: Cues for hand placement and maintaining MITT precautions  IADL's:   Current License: Yes  Mode of Transportation: Car  Vision: Vision - Basic Assessment  Current Vision:  (Reading and driving glasses)  Sensation:  Sensation Comment: No apparent deficits  Coordination:  Movements are Fluid and Coordinated: Yes   Hand Function:  Hand Function  Gross Grasp: Functional  Coordination: Functional  Extremities:   RUE   RUE : Within Functional Limits, LUE   LUE: Within Functional Limits  Outcome Measures: [facility] Daily Activity  Putting on and taking off regular lower body clothing: A little  Bathing (including washing, rinsing, drying): A lot  Putting on and taking off regular upper body clothing: A little  Toileting, which includes using toilet, bedpan or urinal: A little  Taking care of personal grooming such as brushing teeth: A little  Eating Meals: None  Daily Activity - Total Score: 18         ,     OT Adult Other Outcome Measures  4AT: 0    Education Documentation  Body Mechanics, taught by Geneva Palacios OT at 11/7/2024 11:20 AM.  Learner: Significant Other, Patient  Readiness:  Acceptance  Method: Explanation  Response: Verbalizes Understanding    Precautions, taught by Geneva Palacios OT at 11/7/2024 11:20 AM.  Learner: Significant Other, Patient  Readiness: Acceptance  Method: Explanation  Response: Verbalizes Understanding    ADL Training, taught by Geneva Palacios OT at 11/7/2024 11:20 AM.  Learner: Significant Other, Patient  Readiness: Acceptance  Method: Explanation  Response: Verbalizes Understanding    Education Comments  No comments found.        Goals:   Encounter Problems       Encounter Problems (Active)       ADLs       Patient will perform UB and LB bathing with supervision level of assistance and shower chair.       Start:  11/07/24    Expected End:  11/21/24            Patient with complete upper body dressing with modified independent level of assistance donning and doffing all UE clothes with PRN adaptive equipment while edge of bed        Start:  11/07/24    Expected End:  11/21/24            Patient with complete lower body dressing with set-up level of assistance donning and doffing all LE clothes  with PRN adaptive equipment while edge of bed        Start:  11/07/24    Expected End:  11/21/24            Patient will complete toileting including hygiene clothing management/hygiene with modified independent level of assistance and raised toilet seat.       Start:  11/07/24    Expected End:  11/21/24               COGNITION/SAFETY       Patient will recall and adhere to MITT precautions during all functional mobility/ADL tasks in order to demonstrate improved understanding and promote healing post op       Start:  11/07/24    Expected End:  11/21/24               MOBILITY       Patient will perform Functional mobility max Household distances/Community Distances with stand by assist level of assistance and least restrictive device in order to improve safety and functional mobility.       Start:  11/07/24    Expected End:  11/21/24               TRANSFERS       Patient will  complete functional transfer to toilet with least restrictive device with modified independent level of assistance.       Start:  11/07/24    Expected End:  11/21/24                   Treatment Completed on Evaluation    Therapy/Activity:     Therapeutic Activity  Therapeutic Activity 1: Increased time requiried d/t onset of orthostatic hypotention w/ pt unable to fully recover. RN notified.  Therapeutic Activity 2: Pt/family edu MITT precautions and possible AD as  has many concerns related to d/c home. Both very receptive to edu.    11/07/24 at 11:24 AM   DONAVON STEAWRD OT   Rehab Office: [phone]

## 2024-11-07 NOTE — CARE PLAN
The patient's goals for the shift include GATITO    The clinical goals for the shift include Patient will remain HDS throughout shift    Over the shift, the patient did not make progress toward the following goals. Barriers to progression include ***. Recommendations to address these barriers include ***.

## 2024-11-07 NOTE — PROGRESS NOTES
"CARDIAC SURGERY DAILY PROGRESS NOTE      Kiley Ward is a 69 y.o. with PMH/PSH of macular degeneration, HTN, HLD, ascending aortic aneurysm, Crokcer's esophagus, GERD, previous DVT in 2019 (treated with Eliquis x 6 months and now off), breast CA s/p bilateral lumpectomy and chemo who was getting an outpatient routine CT calcium score screening and was incidentally found on imaging to have an ascending aortic aneurysm.       Now presents to the CTICU from the OR s/p Bentall's procedure, posterior pericardiotomy with Dr. Ramirez on 11/5/2025.    Operative Procedures Dr Ramirez on 11/5/24  Median sternotomy, composite replacement of aortic root and ascending aorta with 2 23 mm Availnex aortic valve and 28 mm Gelweave graft, reimplantation of coronary arteries, and posterior pericardiotomy    CTICU course   Transferred to Lt3 on 11/6  ==================    Interval History:   Transferred to Lt3 on 11/6. Noted to have pneumoperitoneum, stable since OR.     SUBJECTIVE:  Feels more awake this morning, but still feels sluggish in relation to baseline. Abdominal exam continues to be benign.     Objective   /61 (BP Location: Left arm, Patient Position: Lying)   Pulse 84   Temp 37 °C (98.6 °F) (Temporal)   Resp 18   Ht 1.626 m (5' 4\")   Wt 70 kg (154 lb 6.4 oz)   SpO2 92%   BMI 26.50 kg/m²   0-10 (Numeric) Pain Score: 6   3 Day Weight Change: Unable to Calculate    Intake and Output    Intake/Output Summary (Last 24 hours) at 11/7/2024 0814  Last data filed at 11/7/2024 0155  Gross per 24 hour   Intake 670 ml   Output 345 ml   Net 325 ml       Physical Exam  Physical Exam  Vitals and nursing note reviewed.   Constitutional:       Appearance: She is normal weight.   HENT:      Mouth/Throat:      Mouth: Mucous membranes are dry.   Eyes:      Conjunctiva/sclera: Conjunctivae normal.   Cardiovascular:      Rate and Rhythm: Normal rate and regular rhythm.      Pulses: Normal pulses.      Heart sounds: Normal heart " sounds.      Comments: Ventricular wires, VVI @ 45  Pulmonary:      Effort: Pulmonary effort is normal.      Breath sounds: Normal breath sounds.   Abdominal:      General: Abdomen is flat. There is no distension.      Palpations: Abdomen is soft.      Tenderness: There is no abdominal tenderness. There is no guarding.   Musculoskeletal:         General: No swelling.      Right lower leg: No edema.      Left lower leg: No edema.   Skin:     General: Skin is warm and dry.      Capillary Refill: Capillary refill takes less than 2 seconds.      Comments: MSI open to air, well approximated and without s/s infection. No drainage.    Neurological:      General: No focal deficit present.      Mental Status: She is alert.         Medications  Scheduled medications  acetaminophen, 975 mg, oral, q6h  aspirin, 81 mg, oral, Daily  atorvastatin, 10 mg, oral, Nightly  ceFAZolin, 2 g, intravenous, q8h  famotidine, 40 mg, oral, Daily  heparin, 5,000 Units, subcutaneous, q8h  lidocaine, 1 patch, transdermal, q24h  methocarbamol, 500 mg, oral, q6h GARY  metoprolol tartrate, 12.5 mg, oral, BID  pantoprazole, 40 mg, oral, Daily before breakfast  polyethylene glycol, 17 g, oral, BID  sennosides-docusate sodium, 2 tablet, oral, BID    Continuous medications   PRN medications  PRN medications: dextrose, dextrose, glucagon, hydrALAZINE, naloxone, ondansetron, oxyCODONE, oxyCODONE, oxygen    Labs  Results for orders placed or performed during the hospital encounter of 11/05/24 (from the past 24 hours)   POCT GLUCOSE   Result Value Ref Range    POCT Glucose 156 (H) 74 - 99 mg/dL         IMAGING/ DIAGNOSTIC TESTING:  I have personally reviewed the following test result(s):    XR CHEST 1 VIEW;  11/5/2024 2:47 pm      FINDINGS:  AP radiograph of the chest was provided.      The patient is leftward rotated.      Interval demonstration of median sternotomy and aortic valve  replacement/repair. Endotracheal tube tip projects 5.3 cm from  the  rosa. Enteric tube tip is beyond the field of view. Right internal  jugular central venous catheter with tip projecting over the superior  vena cava. There are 3 left chest tubes.      CARDIOMEDIASTINAL SILHOUETTE:  Cardiomediastinal silhouette is stable in size and configuration.      LUNGS:  Mild perihilar and bibasilar predominant interstitial opacities,  increased as compared to prior. The right costophrenic angle is  clear. There is no new focal consolidation. The evaluation of the  left costophrenic angle is limited by leftward patient rotation and  obscuration of the cardiomediastinal silhouette. There is no  definitive pneumothorax.      ABDOMEN:  Thin hyperlucency under the right hemidiaphragm.      BONES:  No acute osseous changes.      IMPRESSION:  1. Pneumoperitoneum beneath the right diaphragm  2. Mild pulmonary interstitial edema, increased as compared to prior.      IMPRESSION & PLAN:  POD # 2 s/p Bentall's, posterior pericardiotomy with Sabik on 11/5.   ascending aortic aneurysm.   - Increase activity/ ambulation; PT/OT  - Encourage IS, C/DB; respiratory therapy; wean O2 as lubna   - Cardiac rehab referral   - Continue cardiac meds: ASA, BB, statin  - Pain and anticonstipation meds  - 2v CXR ordered to 11/8  - Postop echo, ordered for 11/8  - Remove epicardial wires prior to discharge   - Tele until discharge  - Optimize nutrition and electrolytes    Rhythm  - Tele: NSR  - Continue metoprolol 12.5 mg BID   - epicardial wires  VVI at 45 bpm   - Adjust medications as tolerated    Acute Blood Loss Anemia   Recent Labs     11/06/24  0015 11/05/24  1341 10/17/24  1103 09/25/24  0925 10/18/22  0903 03/18/21  0856   HGB 11.8* 11.8* 12.4 12.9 13.2 12.9   HCT 34.1* 33.6* 38.3 40.0 40.2 40.1   - MV, PO Iron x1mo  - Daily labs, transfuse as indicated    Thrombocytopenia  Recent Labs     11/06/24  0015 11/05/24  1341 10/17/24  1103 09/25/24  0925 10/18/22  0903 03/18/21  0856    163 296 314 301 279    - Etiology likely postop/CPB related  - Continue to trend with daily CBCs    Volume/Electrolyte Status: Preop wt Weight: 69.2 kg (152 lb 8.9 oz)   Vitals:    24 0513   Weight: 70 kg (154 lb 6.4 oz)     - Weight: 69.2 Kg   - Lasix 20 IV x1 today  - Adjust diuresis as needed for postop cardiac surgery hypervolemia  - Replete electrolytes for hypokalemia/hypomagnesemia/hypophosphatemia as needed   - Daily weights and strict I&Os  - Daily RFP while admitted    Leukocytosis  Recent Labs     24  0015 24  1341 10/17/24  1103 24  0925 10/18/22  0903 21  0856   WBC 13.9* 12.4* 4.2* 5.4 4.8 5.0     Temp (36hrs), Av.4 °C (97.6 °F), Min:36 °C (96.8 °F), Max:37 °C (98.6 °F)  - MRSA negative.  Afebrile with mild reactive leukocytosis.   - aggressive pulmonary hygiene  - monitor for s/s infection  - likely atelectasis/ postoperative in etiology  - daily CBC to follow    Hypertension  Systolic (24hrs), Av , Min:100 , Max:115    - continue BB  - Hold home carvedilol   - Consider ACEis/ ARBS once bp stable    Hyperlipidemia:   Lab Results   Component Value Date    CHOL 177 2024    HDL 59.4 2024    VLDL 15 2024    TRIG 74 2024    NHDL 118 2024   -continue statin  -follow up lipid panel with PCP/ cardio as appropriate    Hx of Crocker's esophagus and GERD. PONV -   - PONV improved with Emend IV this AM.   - Passed bedside swallow and tolerating PO   - Air under right diaphragm on serial CXRs since OR as above, abdominal exam benign with no pain to palpation and nondistended. -> Dr Ramirez was notified   - Cont Dexilant Home meds. Pantoprazole  does not work for her   - Avoid taking pills on an empty stomach  - Eat small frequent meals  - eat sitting up in the chair   - avoid acidic foods    Hx of DVT (2019) Not on any AC  currently   - OOB   - PT /OT   - Continue DVT  prophylaxis     breast CA s/p chemo/bilateral lumpectomy.   - No blood pressures or blood draws at  the site of the lumpectomy     Hx of macular degeneration.   - Falls precautions     OA   - PT/OT   - Hot and cold packs  - Acetaminophen   - No NSAIDs for 3 months after cardiac surgery; if NSAIDs needed after 3 months, clear use with cardiologist before starting    VTE Prophylaxis: SCDs/TEDs, ambulation, SQ heparin  Code Status: Full Code    Dispo  - PT/OT recs Home   - Would benefit from homecare RN for cardiac surgery carepath  - Anticipate discharge in a few days, pending post-op echo, 2 view  - Will continue to assess discharge needs    YOSVANY Solo-CNP  Cardiac Surgery [facility]  [facility]  Team Phone [phone]    11/7/2024  8:14 AM

## 2024-11-08 ENCOUNTER — APPOINTMENT (OUTPATIENT)
Dept: CARDIOLOGY | Facility: HOSPITAL | Age: 69
End: 2024-11-08
Payer: MEDICARE

## 2024-11-08 ENCOUNTER — HOME HEALTH ADMISSION (OUTPATIENT)
Dept: HOME HEALTH SERVICES | Facility: HOME HEALTH | Age: 69
End: 2024-11-08
Payer: MEDICARE

## 2024-11-08 ENCOUNTER — APPOINTMENT (OUTPATIENT)
Dept: RADIOLOGY | Facility: HOSPITAL | Age: 69
DRG: 220 | End: 2024-11-08
Payer: MEDICARE

## 2024-11-08 LAB
ALBUMIN SERPL BCP-MCNC: 3.3 G/DL (ref 3.4–5)
ANION GAP SERPL CALC-SCNC: 12 MMOL/L (ref 10–20)
AORTIC VALVE MEAN GRADIENT: 16 MMHG
AORTIC VALVE PEAK VELOCITY: 2.69 M/S
AV PEAK GRADIENT: 29 MMHG
AVA (PEAK VEL): 3.02 CM2
AVA (VTI): 2.87 CM2
BUN SERPL-MCNC: 27 MG/DL (ref 6–23)
CALCIUM SERPL-MCNC: 8.6 MG/DL (ref 8.6–10.6)
CHLORIDE SERPL-SCNC: 99 MMOL/L (ref 98–107)
CO2 SERPL-SCNC: 28 MMOL/L (ref 21–32)
CREAT SERPL-MCNC: 0.91 MG/DL (ref 0.5–1.05)
EGFRCR SERPLBLD CKD-EPI 2021: 69 ML/MIN/1.73M*2
EJECTION FRACTION APICAL 4 CHAMBER: 50.7
EJECTION FRACTION: 58 %
ERYTHROCYTE [DISTWIDTH] IN BLOOD BY AUTOMATED COUNT: 13.2 % (ref 11.5–14.5)
GLUCOSE BLD MANUAL STRIP-MCNC: 102 MG/DL (ref 74–99)
GLUCOSE BLD MANUAL STRIP-MCNC: 128 MG/DL (ref 74–99)
GLUCOSE BLD MANUAL STRIP-MCNC: 129 MG/DL (ref 74–99)
GLUCOSE SERPL-MCNC: 111 MG/DL (ref 74–99)
HCT VFR BLD AUTO: 29.9 % (ref 36–46)
HGB BLD-MCNC: 9.8 G/DL (ref 12–16)
LEFT VENTRICLE INTERNAL DIMENSION DIASTOLE: 3.7 CM (ref 3.5–6)
LEFT VENTRICULAR OUTFLOW TRACT DIAMETER: 2.2 CM
MAGNESIUM SERPL-MCNC: 2.47 MG/DL (ref 1.6–2.4)
MCH RBC QN AUTO: 30.5 PG (ref 26–34)
MCHC RBC AUTO-ENTMCNC: 32.8 G/DL (ref 32–36)
MCV RBC AUTO: 93 FL (ref 80–100)
MITRAL VALVE E/A RATIO: 1.24
NRBC BLD-RTO: 0 /100 WBCS (ref 0–0)
PHOSPHATE SERPL-MCNC: 2.9 MG/DL (ref 2.5–4.9)
PLATELET # BLD AUTO: 150 X10*3/UL (ref 150–450)
POTASSIUM SERPL-SCNC: 3.8 MMOL/L (ref 3.5–5.3)
RBC # BLD AUTO: 3.21 X10*6/UL (ref 4–5.2)
RIGHT VENTRICLE FREE WALL PEAK S': 8.92 CM/S
RIGHT VENTRICLE PEAK SYSTOLIC PRESSURE: 27.6 MMHG
SODIUM SERPL-SCNC: 135 MMOL/L (ref 136–145)
TRICUSPID ANNULAR PLANE SYSTOLIC EXCURSION: 1.5 CM
WBC # BLD AUTO: 9.5 X10*3/UL (ref 4.4–11.3)

## 2024-11-08 PROCEDURE — 2500000005 HC RX 250 GENERAL PHARMACY W/O HCPCS

## 2024-11-08 PROCEDURE — 85027 COMPLETE CBC AUTOMATED: CPT | Performed by: NURSE PRACTITIONER

## 2024-11-08 PROCEDURE — 93306 TTE W/DOPPLER COMPLETE: CPT | Performed by: INTERNAL MEDICINE

## 2024-11-08 PROCEDURE — 2500000001 HC RX 250 WO HCPCS SELF ADMINISTERED DRUGS (ALT 637 FOR MEDICARE OP): Performed by: NURSE PRACTITIONER

## 2024-11-08 PROCEDURE — 71046 X-RAY EXAM CHEST 2 VIEWS: CPT

## 2024-11-08 PROCEDURE — 80069 RENAL FUNCTION PANEL: CPT | Performed by: NURSE PRACTITIONER

## 2024-11-08 PROCEDURE — 2500000004 HC RX 250 GENERAL PHARMACY W/ HCPCS (ALT 636 FOR OP/ED): Performed by: NURSE PRACTITIONER

## 2024-11-08 PROCEDURE — 82947 ASSAY GLUCOSE BLOOD QUANT: CPT

## 2024-11-08 PROCEDURE — C8929 TTE W OR WO FOL WCON,DOPPLER: HCPCS

## 2024-11-08 PROCEDURE — 94760 N-INVAS EAR/PLS OXIMETRY 1: CPT

## 2024-11-08 PROCEDURE — 1200000002 HC GENERAL ROOM WITH TELEMETRY DAILY

## 2024-11-08 PROCEDURE — 2500000001 HC RX 250 WO HCPCS SELF ADMINISTERED DRUGS (ALT 637 FOR MEDICARE OP)

## 2024-11-08 PROCEDURE — 2500000002 HC RX 250 W HCPCS SELF ADMINISTERED DRUGS (ALT 637 FOR MEDICARE OP, ALT 636 FOR OP/ED): Performed by: NURSE PRACTITIONER

## 2024-11-08 PROCEDURE — 2500000004 HC RX 250 GENERAL PHARMACY W/ HCPCS (ALT 636 FOR OP/ED)

## 2024-11-08 PROCEDURE — 71046 X-RAY EXAM CHEST 2 VIEWS: CPT | Performed by: RADIOLOGY

## 2024-11-08 PROCEDURE — 99232 SBSQ HOSP IP/OBS MODERATE 35: CPT | Performed by: NURSE PRACTITIONER

## 2024-11-08 PROCEDURE — 83735 ASSAY OF MAGNESIUM: CPT | Performed by: NURSE PRACTITIONER

## 2024-11-08 RX ORDER — POTASSIUM CHLORIDE 20 MEQ/1
20 TABLET, EXTENDED RELEASE ORAL ONCE
Status: COMPLETED | OUTPATIENT
Start: 2024-11-08 | End: 2024-11-08

## 2024-11-08 RX ORDER — POLYETHYLENE GLYCOL 3350 17 G/17G
17 POWDER, FOR SOLUTION ORAL 3 TIMES DAILY
Status: DISCONTINUED | OUTPATIENT
Start: 2024-11-08 | End: 2024-11-09

## 2024-11-08 RX ORDER — IRON POLYSACCHARIDE COMPLEX 150 MG
150 CAPSULE ORAL DAILY
Status: DISCONTINUED | OUTPATIENT
Start: 2024-11-08 | End: 2024-11-10 | Stop reason: HOSPADM

## 2024-11-08 RX ORDER — MULTIVIT-MIN/IRON FUM/FOLIC AC 7.5 MG-4
1 TABLET ORAL DAILY
Status: DISCONTINUED | OUTPATIENT
Start: 2024-11-08 | End: 2024-11-10 | Stop reason: HOSPADM

## 2024-11-08 RX ORDER — BISACODYL 10 MG/1
10 SUPPOSITORY RECTAL DAILY PRN
Status: DISCONTINUED | OUTPATIENT
Start: 2024-11-08 | End: 2024-11-10 | Stop reason: HOSPADM

## 2024-11-08 RX ORDER — ACETAMINOPHEN 325 MG/1
975 TABLET ORAL EVERY 8 HOURS SCHEDULED
Status: DISCONTINUED | OUTPATIENT
Start: 2024-11-08 | End: 2024-11-10 | Stop reason: HOSPADM

## 2024-11-08 RX ADMIN — POTASSIUM CHLORIDE 20 MEQ: 1500 TABLET, EXTENDED RELEASE ORAL at 14:50

## 2024-11-08 RX ADMIN — METHOCARBAMOL 500 MG: 500 TABLET ORAL at 13:47

## 2024-11-08 RX ADMIN — SENNOSIDES AND DOCUSATE SODIUM 2 TABLET: 50; 8.6 TABLET ORAL at 08:32

## 2024-11-08 RX ADMIN — Medication 1 TABLET: at 14:50

## 2024-11-08 RX ADMIN — POLYSACCHARIDE-IRON COMPLEX 150 MG: 150 CAPSULE ORAL at 14:50

## 2024-11-08 RX ADMIN — PANTOPRAZOLE SODIUM 40 MG: 40 TABLET, DELAYED RELEASE ORAL at 05:59

## 2024-11-08 RX ADMIN — BENZOCAINE AND MENTHOL 1 LOZENGE: 15; 3.6 LOZENGE ORAL at 08:41

## 2024-11-08 RX ADMIN — ATORVASTATIN CALCIUM 10 MG: 10 TABLET, FILM COATED ORAL at 22:10

## 2024-11-08 RX ADMIN — FAMOTIDINE 40 MG: 20 TABLET ORAL at 08:33

## 2024-11-08 RX ADMIN — LIDOCAINE 1 PATCH: 4 PATCH TOPICAL at 13:47

## 2024-11-08 RX ADMIN — METOPROLOL TARTRATE 12.5 MG: 25 TABLET, FILM COATED ORAL at 22:11

## 2024-11-08 RX ADMIN — PERFLUTREN 10 ML OF DILUTION: 6.52 INJECTION, SUSPENSION INTRAVENOUS at 12:34

## 2024-11-08 RX ADMIN — METHOCARBAMOL 500 MG: 500 TABLET ORAL at 06:07

## 2024-11-08 RX ADMIN — ASPIRIN 81 MG 81 MG: 81 TABLET ORAL at 08:32

## 2024-11-08 RX ADMIN — POLYETHYLENE GLYCOL 3350 17 G: 17 POWDER, FOR SOLUTION ORAL at 08:32

## 2024-11-08 RX ADMIN — METOPROLOL TARTRATE 12.5 MG: 25 TABLET, FILM COATED ORAL at 08:32

## 2024-11-08 RX ADMIN — HEPARIN SODIUM 5000 UNITS: 5000 INJECTION, SOLUTION INTRAVENOUS; SUBCUTANEOUS at 01:18

## 2024-11-08 RX ADMIN — HEPARIN SODIUM 5000 UNITS: 5000 INJECTION, SOLUTION INTRAVENOUS; SUBCUTANEOUS at 22:10

## 2024-11-08 RX ADMIN — ACETAMINOPHEN 975 MG: 325 TABLET, FILM COATED ORAL at 22:10

## 2024-11-08 RX ADMIN — ACETAMINOPHEN 975 MG: 325 TABLET, FILM COATED ORAL at 13:46

## 2024-11-08 RX ADMIN — HEPARIN SODIUM 5000 UNITS: 5000 INJECTION, SOLUTION INTRAVENOUS; SUBCUTANEOUS at 08:33

## 2024-11-08 RX ADMIN — ACETAMINOPHEN 975 MG: 325 TABLET, FILM COATED ORAL at 08:33

## 2024-11-08 ASSESSMENT — PAIN DESCRIPTION - ORIENTATION
ORIENTATION: MID
ORIENTATION: MID

## 2024-11-08 ASSESSMENT — COGNITIVE AND FUNCTIONAL STATUS - GENERAL
DRESSING REGULAR UPPER BODY CLOTHING: A LITTLE
HELP NEEDED FOR BATHING: A LITTLE
CLIMB 3 TO 5 STEPS WITH RAILING: A LITTLE
MOVING TO AND FROM BED TO CHAIR: A LITTLE
DAILY ACTIVITIY SCORE: 18
MOBILITY SCORE: 18
STANDING UP FROM CHAIR USING ARMS: A LITTLE
DRESSING REGULAR LOWER BODY CLOTHING: A LITTLE
PERSONAL GROOMING: A LITTLE
TURNING FROM BACK TO SIDE WHILE IN FLAT BAD: A LITTLE
EATING MEALS: A LITTLE
WALKING IN HOSPITAL ROOM: A LITTLE
TOILETING: A LITTLE
MOVING FROM LYING ON BACK TO SITTING ON SIDE OF FLAT BED WITH BEDRAILS: A LITTLE

## 2024-11-08 ASSESSMENT — PAIN SCALES - GENERAL
PAINLEVEL_OUTOF10: 4
PAINLEVEL_OUTOF10: 2
PAINLEVEL_OUTOF10: 0 - NO PAIN
PAINLEVEL_OUTOF10: 0 - NO PAIN

## 2024-11-08 ASSESSMENT — PAIN - FUNCTIONAL ASSESSMENT
PAIN_FUNCTIONAL_ASSESSMENT: 0-10

## 2024-11-08 ASSESSMENT — PAIN SCALES - WONG BAKER: WONGBAKER_NUMERICALRESPONSE: HURTS LITTLE BIT

## 2024-11-08 ASSESSMENT — PAIN DESCRIPTION - LOCATION
LOCATION: INCISION
LOCATION: INCISION

## 2024-11-08 NOTE — PROGRESS NOTES
Kiley Ward is a 69 y.o. female on day 3 of admission presenting with S/P ascending aortic aneurysm repair.       Pt to be discharged with [facility]. ADOD 11/10. [facility] nurse updated of pt's ADOD.     This TCC will continue to follow up with discharge planning.

## 2024-11-08 NOTE — PROGRESS NOTES
"CARDIAC SURGERY DAILY PROGRESS NOTE      Kiley Ward is a 69 y.o. with PMH/PSH of macular degeneration, HTN, HLD, ascending aortic aneurysm, Crocker's esophagus, GERD, previous DVT in 2019 (treated with Eliquis x 6 months and now off), breast CA s/p bilateral lumpectomy and chemo who was getting an outpatient routine CT calcium score screening and was incidentally found on imaging to have an ascending aortic aneurysm.       Now presents to the CTICU from the OR s/p Bentall's procedure, posterior pericardiotomy with Dr. Ramirez on 11/5/2025.    Operative Procedures Dr Ramirez on 11/5/24  Median sternotomy, composite replacement of aortic root and ascending aorta with 2 23 mm Availnex aortic valve and 28 mm Gelweave graft, reimplantation of coronary arteries, and posterior pericardiotomy    CTICU course   Transferred to Lt3 on 11/6  ==================    Interval History:   Transferred to Lt3 on 11/6. Noted to have pneumoperitoneum, stable since OR.     SUBJECTIVE:  Feels more awake this morning, but still feels sluggish in relation to baseline. Abdominal exam continues to be benign.     Objective   BP 99/63 (BP Location: Left arm, Patient Position: Lying)   Pulse 75   Temp 36.2 °C (97.2 °F) (Temporal)   Resp 18   Ht 1.626 m (5' 4\")   Wt 70.8 kg (156 lb 1.6 oz)   SpO2 92%   BMI 26.79 kg/m²   0-10 (Numeric) Pain Score: 4  Quinn-Baker FACES Pain Rating: Hurts little bit   3 Day Weight Change: 0.536 kg (1 lb 2.9 oz) per day    Intake and Output    Intake/Output Summary (Last 24 hours) at 11/8/2024 1417  Last data filed at 11/8/2024 1313  Gross per 24 hour   Intake 660 ml   Output 1420 ml   Net -760 ml       Physical Exam  Physical Exam  Vitals and nursing note reviewed.   Constitutional:       Appearance: She is normal weight.      Comments: Sitting in chair   HENT:      Head: Atraumatic.      Mouth/Throat:      Mouth: Mucous membranes are dry.   Eyes:      Conjunctiva/sclera: Conjunctivae normal.   Cardiovascular: "      Rate and Rhythm: Normal rate and regular rhythm.      Pulses: Normal pulses.      Heart sounds: Normal heart sounds.      Comments: TELE - SR 60s-70s  Ventricular wires, VVI @ 45  Pulmonary:      Effort: Pulmonary effort is normal.      Comments: On RA  Diminished bases  Sternum stable  Abdominal:      General: Abdomen is flat. Bowel sounds are normal.      Palpations: Abdomen is soft.      Comments: BM   Genitourinary:     Comments: Voiding independently  Musculoskeletal:         General: No swelling.      Cervical back: Neck supple.      Right lower leg: No edema.      Left lower leg: No edema.   Skin:     General: Skin is warm and dry.      Capillary Refill: Capillary refill takes less than 2 seconds.      Comments: MSI open to air, well approximated and without s/s infection. No drainage.    Neurological:      General: No focal deficit present.      Mental Status: She is alert and oriented to person, place, and time.   Psychiatric:         Mood and Affect: Mood normal.         Behavior: Behavior normal.         Medications  Scheduled medications  acetaminophen, 975 mg, oral, q8h GARY  atorvastatin, 10 mg, oral, Nightly  famotidine, 40 mg, oral, Daily  heparin, 5,000 Units, subcutaneous, q8h  lidocaine, 1 patch, transdermal, q24h  methocarbamol, 500 mg, oral, q6h GARY  metoprolol tartrate, 12.5 mg, oral, BID  pantoprazole, 40 mg, oral, Daily before breakfast  polyethylene glycol, 17 g, oral, TID  potassium chloride CR, 20 mEq, oral, Once  sennosides-docusate sodium, 2 tablet, oral, BID    Continuous medications   PRN medications  PRN medications: benzocaine-menthol, bisacodyl, dextrose, dextrose, glucagon, naloxone, ondansetron, oxyCODONE, oxyCODONE, oxygen, phenoL    Labs  Results for orders placed or performed during the hospital encounter of 11/05/24 (from the past 24 hours)   POCT GLUCOSE   Result Value Ref Range    POCT Glucose 127 (H) 74 - 99 mg/dL   POCT GLUCOSE   Result Value Ref Range    POCT Glucose  134 (H) 74 - 99 mg/dL   POCT GLUCOSE   Result Value Ref Range    POCT Glucose 125 (H) 74 - 99 mg/dL   POCT GLUCOSE   Result Value Ref Range    POCT Glucose 128 (H) 74 - 99 mg/dL   POCT GLUCOSE   Result Value Ref Range    POCT Glucose 129 (H) 74 - 99 mg/dL   Transthoracic Echo (TTE) Complete   Result Value Ref Range    AV pk julio 2.69 m/s    AV mn grad 16 mmHg    LVOT diam 2.20 cm    MV E/A ratio 1.24     Tricuspid annular plane systolic excursion 1.5 cm    LV EF 58 %    RV free wall pk S' 8.92 cm/s    LVIDd 3.70 cm    RVSP 27.6 mmHg    Aortic Valve Area by Continuity of VTI 2.87 cm2    Aortic Valve Area by Continuity of Peak Velocity 3.02 cm2    AV pk grad 29 mmHg    LV A4C EF 50.7    CBC   Result Value Ref Range    WBC 9.5 4.4 - 11.3 x10*3/uL    nRBC 0.0 0.0 - 0.0 /100 WBCs    RBC 3.21 (L) 4.00 - 5.20 x10*6/uL    Hemoglobin 9.8 (L) 12.0 - 16.0 g/dL    Hematocrit 29.9 (L) 36.0 - 46.0 %    MCV 93 80 - 100 fL    MCH 30.5 26.0 - 34.0 pg    MCHC 32.8 32.0 - 36.0 g/dL    RDW 13.2 11.5 - 14.5 %    Platelets 150 150 - 450 x10*3/uL   Renal Function Panel   Result Value Ref Range    Glucose 111 (H) 74 - 99 mg/dL    Sodium 135 (L) 136 - 145 mmol/L    Potassium 3.8 3.5 - 5.3 mmol/L    Chloride 99 98 - 107 mmol/L    Bicarbonate 28 21 - 32 mmol/L    Anion Gap 12 10 - 20 mmol/L    Urea Nitrogen 27 (H) 6 - 23 mg/dL    Creatinine 0.91 0.50 - 1.05 mg/dL    eGFR 69 >60 mL/min/1.73m*2    Calcium 8.6 8.6 - 10.6 mg/dL    Phosphorus 2.9 2.5 - 4.9 mg/dL    Albumin 3.3 (L) 3.4 - 5.0 g/dL   Magnesium   Result Value Ref Range    Magnesium 2.47 (H) 1.60 - 2.40 mg/dL         IMAGING/ DIAGNOSTIC TESTING:  I have personally reviewed the following test result(s):      XR chest 2 views 11/08/2024  Impression  1.  Small bilateral pleural effusions which appear greater on the left than right.  2. Probable subsegmental atelectasis left lower lobe behind the heart.  3. Moderate cardiomegaly, underlying pericardial effusion not  excluded.    Transthoracic Echo (TTE) Complete With Contrast 11/08/2024  PHYSICIAN INTERPRETATION:  Left Ventricle: Left ventricular ejection fraction is normal, by visual estimate at 55-60%. There are no regional left ventricular wall motion abnormalities. The left ventricular cavity size is normal. There is mildly increased septal and mildly increased posterior left ventricular wall thickness. There is left ventricular concentric remodeling. Abnormal (paradoxical) septal motion consistent with post-operative status and the interventricular septum is flattened in diastole ('D' shaped left ventricle), consistent with right ventricular volume overload. Spectral Doppler shows a Grade II (pseudonormal pattern) of left ventricular diastolic filling with an elevated left atrial pressure.  Left Atrium: The left atrium is mildly dilated.  Right Ventricle: The right ventricle is mildly enlarged. There is mildly reduced right ventricular systolic function.  Right Atrium: The right atrium is mild to moderately dilated.  Aortic Valve: There is a prosthetic aortic valve present. The aortic valve dimensionless index is 0.75. There is no evidence of aortic valve regurgitation. The peak instantaneous gradient of the aortic valve is 29 mmHg. The mean gradient of the aortic valve is 16 mmHg. S/p Bental procedure.  Mitral Valve: The mitral valve is normal in structure. There is trace to mild mitral valve regurgitation.  Tricuspid Valve: The tricuspid valve is structurally normal. There is moderate tricuspid regurgitation. The Doppler estimated RVSP is within normal limits at 27.6 mmHg.  Pulmonic Valve: The pulmonic valve is structurally normal. There is physiologic pulmonic valve regurgitation.  Pericardium: Trivial pericardial effusion.  Pleural: There is a moderate left pleural effusion.  Aorta: The aortic root is abnormal. There is a prosthetic graft seen in the position of the ascending aorta. S/p Bentalls procedure.  In  comparison to the previous echocardiogram(s): Compared with study dated 11/5/2024, expected post-op changes except the TR.  CONCLUSIONS:  1. Left ventricular ejection fraction is normal, by visual estimate at 55-60%.  2. Spectral Doppler shows a Grade II (pseudonormal pattern) of left ventricular diastolic filling with an elevated left atrial pressure.  3. Abnormal septal motion consistent with post-operative status and right ventricular volume overload.  4. There is mildly reduced right ventricular systolic function.  5. Mildly enlarged right ventricle.  6. The left atrium is mildly dilated.  7. The right atrium is mild to moderately dilated.  8. There is a moderate pleural effusion.  9. Moderate tricuspid regurgitation visualized.  10. Right ventricular systolic pressure is within normal limits.  11. Prosthetic graft in the ascending aorta position.  12. Compared with study dated 11/5/2024, expected post-op changes except the TR.  ** Final **         ===============  IMPRESSION & PLAN:  ===============  POD # 3 s/p Bentall's, posterior pericardiotomy with Sabik on 11/5.   ascending aortic aneurysm.   - Increase activity/ ambulation; PT/OT  - Encourage IS, C/DB; respiratory therapy; wean O2 as lubna -> on RA  - Cardiac rehab referral   - Continue cardiac meds: ASA, BB, statin  - Pain and anticonstipation meds  - 2v CXR ordered to 11/8  - Postop echo 11/8  - CUT epicardial wires prior to discharge   - Tele until discharge  - Optimize nutrition and electrolytes    Rhythm  - Tele: NSR  - Continue metoprolol 12.5 mg BID   - epicardial wires  VVI at 45 bpm   - Adjust medications as tolerated    Acute Blood Loss Anemia   Recent Labs     11/08/24  1339 11/06/24  0015 11/05/24  1341 10/17/24  1103 09/25/24  0925 10/18/22  0903 03/18/21  0856   HGB 9.8* 11.8* 11.8* 12.4 12.9 13.2 12.9   HCT 29.9* 34.1* 33.6* 38.3 40.0 40.2 40.1   - MV, PO Iron x1mo  - Daily labs, transfuse as indicated    Platelets  Recent Labs      24  1339 24  0015 24  1341 10/17/24  1103 24  0925 10/18/22  0903 21  0856    165 163 296 314 301 279   - Etiology likely postop/CPB related  - Continue to trend with daily CBCs    Volume/Electrolyte Status: Preop wt Weight: 69.2 kg (152 lb 8.9 oz)   Vitals:    24 0127   Weight: 70.8 kg (156 lb 1.6 oz)     - Weight: 70.8, 70, 69.2 Kg   -  Lasix 20 IV x1 today  -  hold off on diuresis for relative hypotension  - Adjust diuresis as needed for postop cardiac surgery hypervolemia  - Replete electrolytes for hypokalemia/hypomagnesemia/hypophosphatemia as needed:  replaced K  - Daily weights and strict I&Os  - Daily RFP while admitted    Leukocytosis - resolving  Recent Labs     24  1339 24  0015 24  1341 10/17/24  1103 24  0925 10/18/22  0903 21  0856   WBC 9.5 13.9* 12.4* 4.2* 5.4 4.8 5.0   Temp (36hrs), Av.8 °C (98.2 °F), Min:36.2 °C (97.2 °F), Max:37.1 °C (98.8 °F)  - MRSA negative.  Afebrile with mild reactive leukocytosis.   - aggressive pulmonary hygiene  - monitor for s/s infection  - likely atelectasis/ postoperative in etiology  - daily CBC to follow    Hypertension  Systolic (24hrs), Av , Min:90 , Max:112   - continue BB - metoprolol 12.5 mg BID  - Hold home carvedilol (home dose 6.25 BID)  - adjust meds as tolerated    Hyperlipidemia: home med: atorvastatin 10 mg daily  Lab Results   Component Value Date    CHOL 177 2024    HDL 59.4 2024    VLDL 15 2024    TRIG 74 2024    NHDL 118 2024   -continue statin  -follow up lipid panel with PCP/ cardio as appropriate    Hx of Crocker's esophagus and GERD. PONV - home meds: dexilant 60 mg daily, famotidine 40 mg daily  - ICU  PONV improved with Emend IV  - Passed bedside swallow and tolerating PO   - Air under right diaphragm on serial CXRs since OR as above, abdominal exam benign with no pain to palpation and nondistended. -> Dr Ramirez was  notified   - Cont Dexilant Home meds. Pantoprazole does not work for her   - continue home famotidine  - Avoid taking pills on an empty stomach  - Eat small frequent meals  - eat sitting up in the chair   - avoid acidic foods    Hx of DVT (2019) Not on any AC  currently   - OOB   - PT /OT   - Continue DVT  prophylaxis     breast CA s/p chemo/bilateral lumpectomy.   - No blood pressures or blood draws at the site of the lumpectomy     Hx of macular degeneration.   - Falls precautions     OA   - PT/OT   - Hot and cold packs  - Acetaminophen   - No NSAIDs for 3 months after cardiac surgery; if NSAIDs needed after 3 months, clear use with cardiologist before starting    VTE Prophylaxis: SCDs/TEDs, ambulation, SQ heparin  Code Status: Full Code    Dispo  - PT/OT recs Home   - Would benefit from homecare RN for cardiac surgery carepath  - Anticipate discharge in a couple of days, pending BP, volume status  - Will continue to assess discharge needs    YOSVANY Petersen-CNP  Cardiac Surgery DANNY  [facility]  Team Phone [phone]

## 2024-11-08 NOTE — CARE PLAN
The patient's goals for the shift include GATITO    The clinical goals for the shift include free from falls during shift    Over the shift, the patient did not make progress toward the following goals. Barriers to progression include ***. Recommendations to address these barriers include ***.

## 2024-11-08 NOTE — CARE PLAN
The patient's goals for the shift include get good quality of sleep     The clinical goals for the shift include pt will remian safe and HDS throughout the shift

## 2024-11-09 PROBLEM — G89.18 ACUTE POSTOPERATIVE PAIN: Status: RESOLVED | Noted: 2024-11-06 | Resolved: 2024-11-09

## 2024-11-09 LAB
ALBUMIN SERPL BCP-MCNC: 3.2 G/DL (ref 3.4–5)
ANION GAP SERPL CALC-SCNC: 12 MMOL/L (ref 10–20)
BUN SERPL-MCNC: 21 MG/DL (ref 6–23)
CALCIUM SERPL-MCNC: 8.9 MG/DL (ref 8.6–10.6)
CHLORIDE SERPL-SCNC: 102 MMOL/L (ref 98–107)
CO2 SERPL-SCNC: 28 MMOL/L (ref 21–32)
CREAT SERPL-MCNC: 0.79 MG/DL (ref 0.5–1.05)
EGFRCR SERPLBLD CKD-EPI 2021: 82 ML/MIN/1.73M*2
ERYTHROCYTE [DISTWIDTH] IN BLOOD BY AUTOMATED COUNT: 13.7 % (ref 11.5–14.5)
GLUCOSE SERPL-MCNC: 101 MG/DL (ref 74–99)
HCT VFR BLD AUTO: 30.3 % (ref 36–46)
HGB BLD-MCNC: 9.9 G/DL (ref 12–16)
MAGNESIUM SERPL-MCNC: 2.3 MG/DL (ref 1.6–2.4)
MCH RBC QN AUTO: 30.6 PG (ref 26–34)
MCHC RBC AUTO-ENTMCNC: 32.7 G/DL (ref 32–36)
MCV RBC AUTO: 94 FL (ref 80–100)
NRBC BLD-RTO: 0 /100 WBCS (ref 0–0)
PHOSPHATE SERPL-MCNC: 2.7 MG/DL (ref 2.5–4.9)
PLATELET # BLD AUTO: 199 X10*3/UL (ref 150–450)
POTASSIUM SERPL-SCNC: 3.9 MMOL/L (ref 3.5–5.3)
RBC # BLD AUTO: 3.24 X10*6/UL (ref 4–5.2)
SODIUM SERPL-SCNC: 138 MMOL/L (ref 136–145)
WBC # BLD AUTO: 8.3 X10*3/UL (ref 4.4–11.3)

## 2024-11-09 PROCEDURE — 1200000002 HC GENERAL ROOM WITH TELEMETRY DAILY

## 2024-11-09 PROCEDURE — 2500000001 HC RX 250 WO HCPCS SELF ADMINISTERED DRUGS (ALT 637 FOR MEDICARE OP): Performed by: NURSE PRACTITIONER

## 2024-11-09 PROCEDURE — 83735 ASSAY OF MAGNESIUM: CPT | Performed by: NURSE PRACTITIONER

## 2024-11-09 PROCEDURE — 85027 COMPLETE CBC AUTOMATED: CPT | Performed by: NURSE PRACTITIONER

## 2024-11-09 PROCEDURE — 84100 ASSAY OF PHOSPHORUS: CPT | Performed by: NURSE PRACTITIONER

## 2024-11-09 PROCEDURE — 2500000004 HC RX 250 GENERAL PHARMACY W/ HCPCS (ALT 636 FOR OP/ED): Performed by: NURSE PRACTITIONER

## 2024-11-09 PROCEDURE — 99232 SBSQ HOSP IP/OBS MODERATE 35: CPT | Performed by: NURSE PRACTITIONER

## 2024-11-09 PROCEDURE — 2500000002 HC RX 250 W HCPCS SELF ADMINISTERED DRUGS (ALT 637 FOR MEDICARE OP, ALT 636 FOR OP/ED): Performed by: NURSE PRACTITIONER

## 2024-11-09 PROCEDURE — 2500000001 HC RX 250 WO HCPCS SELF ADMINISTERED DRUGS (ALT 637 FOR MEDICARE OP)

## 2024-11-09 PROCEDURE — 36416 COLLJ CAPILLARY BLOOD SPEC: CPT | Performed by: NURSE PRACTITIONER

## 2024-11-09 RX ORDER — DOCUSATE SODIUM 100 MG/1
100 CAPSULE, LIQUID FILLED ORAL 2 TIMES DAILY
Status: DISCONTINUED | OUTPATIENT
Start: 2024-11-09 | End: 2024-11-10 | Stop reason: HOSPADM

## 2024-11-09 RX ORDER — FUROSEMIDE 20 MG/1
20 TABLET ORAL DAILY
Status: DISCONTINUED | OUTPATIENT
Start: 2024-11-09 | End: 2024-11-10 | Stop reason: HOSPADM

## 2024-11-09 RX ORDER — MULTIVIT-MIN/IRON FUM/FOLIC AC 7.5 MG-4
1 TABLET ORAL DAILY
COMMUNITY
Start: 2024-11-10 | End: 2024-12-16 | Stop reason: ALTCHOICE

## 2024-11-09 RX ORDER — CALCIUM CARBONATE 200(500)MG
1000 TABLET,CHEWABLE ORAL DAILY
Status: DISCONTINUED | OUTPATIENT
Start: 2024-11-09 | End: 2024-11-09

## 2024-11-09 RX ORDER — ASPIRIN 81 MG/1
81 TABLET ORAL DAILY
Status: DISCONTINUED | OUTPATIENT
Start: 2024-11-09 | End: 2024-11-10 | Stop reason: HOSPADM

## 2024-11-09 RX ORDER — POTASSIUM CHLORIDE 20 MEQ/1
20 TABLET, EXTENDED RELEASE ORAL ONCE
Status: COMPLETED | OUTPATIENT
Start: 2024-11-09 | End: 2024-11-09

## 2024-11-09 RX ORDER — CARVEDILOL 3.12 MG/1
6.25 TABLET ORAL 2 TIMES DAILY
Status: DISCONTINUED | OUTPATIENT
Start: 2024-11-09 | End: 2024-11-10 | Stop reason: HOSPADM

## 2024-11-09 RX ORDER — ACETAMINOPHEN 325 MG/1
650 TABLET ORAL EVERY 6 HOURS PRN
COMMUNITY
Start: 2024-11-09

## 2024-11-09 RX ORDER — POLYETHYLENE GLYCOL 3350 17 G/17G
17 POWDER, FOR SOLUTION ORAL DAILY PRN
Status: DISCONTINUED | OUTPATIENT
Start: 2024-11-09 | End: 2024-11-10 | Stop reason: HOSPADM

## 2024-11-09 RX ORDER — DOCUSATE SODIUM 100 MG/1
100 CAPSULE, LIQUID FILLED ORAL 2 TIMES DAILY PRN
COMMUNITY
Start: 2024-11-09 | End: 2024-12-16 | Stop reason: ALTCHOICE

## 2024-11-09 RX ORDER — ASPIRIN 81 MG/1
81 TABLET ORAL DAILY
COMMUNITY
Start: 2024-11-09 | End: 2025-11-09

## 2024-11-09 RX ORDER — CALCIUM CARBONATE 200(500)MG
500 TABLET,CHEWABLE ORAL 4 TIMES DAILY PRN
Status: DISCONTINUED | OUTPATIENT
Start: 2024-11-09 | End: 2024-11-10 | Stop reason: HOSPADM

## 2024-11-09 RX ORDER — POLYETHYLENE GLYCOL 3350 17 G/17G
17 POWDER, FOR SOLUTION ORAL DAILY PRN
COMMUNITY
Start: 2024-11-09 | End: 2025-01-21 | Stop reason: ALTCHOICE

## 2024-11-09 RX ORDER — CALCIUM CARBONATE 200(500)MG
1000 TABLET,CHEWABLE ORAL ONCE
Status: COMPLETED | OUTPATIENT
Start: 2024-11-09 | End: 2024-11-09

## 2024-11-09 RX ADMIN — HEPARIN SODIUM 5000 UNITS: 5000 INJECTION, SOLUTION INTRAVENOUS; SUBCUTANEOUS at 06:40

## 2024-11-09 RX ADMIN — CALCIUM CARBONATE 1000 MG: 500 TABLET, CHEWABLE ORAL at 01:27

## 2024-11-09 RX ADMIN — ACETAMINOPHEN 975 MG: 325 TABLET, FILM COATED ORAL at 06:40

## 2024-11-09 RX ADMIN — METOPROLOL TARTRATE 12.5 MG: 25 TABLET, FILM COATED ORAL at 08:22

## 2024-11-09 RX ADMIN — HEPARIN SODIUM 5000 UNITS: 5000 INJECTION, SOLUTION INTRAVENOUS; SUBCUTANEOUS at 16:50

## 2024-11-09 RX ADMIN — PANTOPRAZOLE SODIUM 40 MG: 40 TABLET, DELAYED RELEASE ORAL at 06:40

## 2024-11-09 RX ADMIN — CARVEDILOL 6.25 MG: 3.12 TABLET, FILM COATED ORAL at 21:39

## 2024-11-09 RX ADMIN — POLYSACCHARIDE-IRON COMPLEX 150 MG: 150 CAPSULE ORAL at 08:22

## 2024-11-09 RX ADMIN — CALCIUM CARBONATE (ANTACID) CHEW TAB 500 MG 500 MG: 500 CHEW TAB at 16:52

## 2024-11-09 RX ADMIN — Medication 1 TABLET: at 08:22

## 2024-11-09 RX ADMIN — FAMOTIDINE 40 MG: 20 TABLET ORAL at 08:22

## 2024-11-09 RX ADMIN — POTASSIUM CHLORIDE 20 MEQ: 1500 TABLET, EXTENDED RELEASE ORAL at 11:47

## 2024-11-09 RX ADMIN — ACETAMINOPHEN 975 MG: 325 TABLET, FILM COATED ORAL at 21:38

## 2024-11-09 RX ADMIN — ACETAMINOPHEN 975 MG: 325 TABLET, FILM COATED ORAL at 13:36

## 2024-11-09 RX ADMIN — ATORVASTATIN CALCIUM 10 MG: 10 TABLET, FILM COATED ORAL at 21:38

## 2024-11-09 RX ADMIN — FUROSEMIDE 20 MG: 20 TABLET ORAL at 11:47

## 2024-11-09 RX ADMIN — ASPIRIN 81 MG: 81 TABLET, COATED ORAL at 16:50

## 2024-11-09 ASSESSMENT — PAIN - FUNCTIONAL ASSESSMENT
PAIN_FUNCTIONAL_ASSESSMENT: 0-10

## 2024-11-09 ASSESSMENT — COGNITIVE AND FUNCTIONAL STATUS - GENERAL
DAILY ACTIVITIY SCORE: 24
CLIMB 3 TO 5 STEPS WITH RAILING: A LITTLE
MOBILITY SCORE: 23

## 2024-11-09 ASSESSMENT — PAIN SCALES - GENERAL
PAINLEVEL_OUTOF10: 0 - NO PAIN
PAINLEVEL_OUTOF10: 2
PAINLEVEL_OUTOF10: 0 - NO PAIN

## 2024-11-09 ASSESSMENT — PAIN DESCRIPTION - LOCATION: LOCATION: INCISION

## 2024-11-09 NOTE — PROGRESS NOTES
"CARDIAC SURGERY DAILY PROGRESS NOTE      Kiley Ward is a 69 y.o. with PMH/PSH of macular degeneration, HTN, HLD, ascending aortic aneurysm, Crocker's esophagus, GERD, previous DVT in 2019 (treated with Eliquis x 6 months and now off), breast CA s/p bilateral lumpectomy and chemo who was getting an outpatient routine CT calcium score screening and was incidentally found on imaging to have an ascending aortic aneurysm.       Now presents to the CTICU from the OR s/p Bentall's procedure, posterior pericardiotomy with Dr. Ramirez on 11/5/2025.    Operative Procedures Dr Ramirez on 11/5/24  Median sternotomy, composite replacement of aortic root and ascending aorta with 2 23 mm Availnex aortic valve and 28 mm Gelweave graft, reimplantation of coronary arteries, and posterior pericardiotomy    CTICU course   Transferred to Lt3 on 11/6  ==================    Interval History:   Transferred to Lt3 on 11/6. Noted to have pneumoperitoneum, stable since OR.   Uneventful night    SUBJECTIVE:  No complaints; states no pain    Objective   /74   Pulse 74   Temp 36.8 °C (98.2 °F)   Resp 20   Ht 1.626 m (5' 4\")   Wt 68.9 kg (152 lb)   SpO2 94%   BMI 26.09 kg/m²   0-10 (Numeric) Pain Score: 2   3 Day Weight Change: Unable to Calculate    Intake and Output    Intake/Output Summary (Last 24 hours) at 11/9/2024 1514  Last data filed at 11/9/2024 1416  Gross per 24 hour   Intake 1020 ml   Output 800 ml   Net 220 ml       Physical Exam  Physical Exam  Vitals and nursing note reviewed.   Constitutional:       General: She is not in acute distress.     Comments: Sitting in chair   HENT:      Head: Normocephalic and atraumatic.      Mouth/Throat:      Mouth: Mucous membranes are moist.   Eyes:      Conjunctiva/sclera: Conjunctivae normal.   Cardiovascular:      Rate and Rhythm: Normal rate and regular rhythm.      Pulses: Normal pulses.      Heart sounds: Murmur (LLSB) heard.      Systolic murmur is present with a grade of 3/6. "      Comments: TELE - SR 70s-70s  Wires capped  Pulmonary:      Effort: Pulmonary effort is normal.      Comments: On RA  Diminished bases  Sternum stable  Abdominal:      General: Abdomen is flat. Bowel sounds are normal.      Palpations: Abdomen is soft.      Tenderness: There is no abdominal tenderness.      Comments:  11/9   Genitourinary:     Comments: Voiding independently  Musculoskeletal:      Cervical back: Neck supple.      Right lower leg: No edema.      Left lower leg: No edema.   Skin:     General: Skin is warm and dry.      Capillary Refill: Capillary refill takes less than 2 seconds.      Comments: MSI open to air, well approximated and without s/s infection. No drainage.    Neurological:      General: No focal deficit present.      Mental Status: She is alert and oriented to person, place, and time.   Psychiatric:         Mood and Affect: Mood normal.         Behavior: Behavior normal.         Medications  Scheduled medications  acetaminophen, 975 mg, oral, q8h GARY  aspirin 81 mg oral daily  atorvastatin, 10 mg, oral, Nightly  docusate sodium, 100 mg, oral, BID  famotidine, 40 mg, oral, Daily  furosemide, 20 mg, oral, Daily  heparin, 5,000 Units, subcutaneous, q8h  iron polysaccharides, 150 mg, oral, Daily  lidocaine, 1 patch, transdermal, q24h  metoprolol tartrate, 12.5 mg, oral, BID  multivitamin with minerals, 1 tablet, oral, Daily  pantoprazole, 40 mg, oral, Daily before breakfast    Continuous medications   PRN medications  PRN medications: benzocaine-menthol, bisacodyl, calcium carbonate, ondansetron, oxygen, phenoL, polyethylene glycol    Labs  Results for orders placed or performed during the hospital encounter of 11/05/24 (from the past 24 hours)   POCT GLUCOSE   Result Value Ref Range    POCT Glucose 102 (H) 74 - 99 mg/dL   Magnesium   Result Value Ref Range    Magnesium 2.30 1.60 - 2.40 mg/dL   CBC   Result Value Ref Range    WBC 8.3 4.4 - 11.3 x10*3/uL    nRBC 0.0 0.0 - 0.0 /100 WBCs     RBC 3.24 (L) 4.00 - 5.20 x10*6/uL    Hemoglobin 9.9 (L) 12.0 - 16.0 g/dL    Hematocrit 30.3 (L) 36.0 - 46.0 %    MCV 94 80 - 100 fL    MCH 30.6 26.0 - 34.0 pg    MCHC 32.7 32.0 - 36.0 g/dL    RDW 13.7 11.5 - 14.5 %    Platelets 199 150 - 450 x10*3/uL   Renal Function Panel   Result Value Ref Range    Glucose 101 (H) 74 - 99 mg/dL    Sodium 138 136 - 145 mmol/L    Potassium 3.9 3.5 - 5.3 mmol/L    Chloride 102 98 - 107 mmol/L    Bicarbonate 28 21 - 32 mmol/L    Anion Gap 12 10 - 20 mmol/L    Urea Nitrogen 21 6 - 23 mg/dL    Creatinine 0.79 0.50 - 1.05 mg/dL    eGFR 82 >60 mL/min/1.73m*2    Calcium 8.9 8.6 - 10.6 mg/dL    Phosphorus 2.7 2.5 - 4.9 mg/dL    Albumin 3.2 (L) 3.4 - 5.0 g/dL         IMAGING/ DIAGNOSTIC TESTING:  I have personally reviewed the following test result(s):      XR chest 2 views 11/08/2024  Impression  1.  Small bilateral pleural effusions which appear greater on the left than right.  2. Probable subsegmental atelectasis left lower lobe behind the heart.  3. Moderate cardiomegaly, underlying pericardial effusion not excluded.    Transthoracic Echo (TTE) Complete With Contrast 11/08/2024  CONCLUSIONS:  1. Left ventricular ejection fraction is normal, by visual estimate at 55-60%.  2. Spectral Doppler shows a Grade II (pseudonormal pattern) of left ventricular diastolic filling with an elevated left atrial pressure.  3. Abnormal septal motion consistent with post-operative status and right ventricular volume overload.  4. There is mildly reduced right ventricular systolic function.  5. Mildly enlarged right ventricle.  6. The left atrium is mildly dilated.  7. The right atrium is mild to moderately dilated.  8. There is a moderate pleural effusion.  9. Moderate tricuspid regurgitation visualized.  10. Right ventricular systolic pressure is within normal limits.  11. Prosthetic graft in the ascending aorta position.  12. Compared with study dated 11/5/2024, expected post-op changes except the  TR.  ** Final **         ===============  IMPRESSION & PLAN:  ===============  POD # 4 s/p Bentall's, posterior pericardiotomy with [place] on 11/5  ascending aortic aneurysm.   - Increase activity/ ambulation; PT/OT  - Encourage IS, C/DB; respiratory therapy; wean O2 as lubna -> on RA  - Cardiac rehab referral   - Continue cardiac meds: ASA, BB, statin  - Pain and anticonstipation meds  - 2v CXR 11/8 - reviewed by Dr Ramirez  - Postop echo 11/8 - reviewed by Dr Ramirez  - CUT epicardial wires prior to discharge   - Tele until discharge  - Optimize nutrition and electrolytes    Rhythm  - Tele: NSR  - 11/9 changed metoprolol 12.5 mg BID to home coreg  - epicardial wires  capped 11/9  - Adjust medications as tolerated    Acute Blood Loss Anemia   Recent Labs     11/09/24  0516 11/08/24  1339 11/06/24  0015 11/05/24  1341 10/17/24  1103 09/25/24  0925 10/18/22  0903   HGB 9.9* 9.8* 11.8* 11.8* 12.4 12.9 13.2   HCT 30.3* 29.9* 34.1* 33.6* 38.3 40.0 40.2   - MV, PO Iron x1mo  - Daily labs, transfuse as indicated    Platelets  Recent Labs     11/09/24  0516 11/08/24  1339 11/06/24  0015 11/05/24  1341 10/17/24  1103 09/25/24  0925 10/18/22  0903    150 165 163 296 314 301   - Continue to trend with daily CBCs    Volume/Electrolyte Status: Preop wt Weight: 69.2 kg (152 lb 8.9 oz)   Vitals:    11/09/24 0256   Weight: 68.9 kg (152 lb)     - Weight: 68.9, 70.5, 70.8, 70, 69.2 Kg   - 11/7 Lasix 20 IV x1 today  - 11/8 hold off on diuresis for relative hypotension  - 11/9 added lasix 20 mg oral daily  - Adjust diuresis as needed for postop cardiac surgery hypervolemia  - Replete electrolytes for hypokalemia/hypomagnesemia/hypophosphatemia as needed: 11/8 replaced K; 11/9 replaced K  - Daily weights and strict I&Os  - Daily RFP while admitted    Leukocytosis - resolved  Recent Labs     11/09/24  0516 11/08/24  1339 11/06/24  0015 11/05/24  1341 10/17/24  1103 09/25/24  0925 10/18/22  0903   WBC 8.3 9.5 13.9* 12.4* 4.2* 5.4 4.8    Temp (36hrs), Av.5 °C (97.7 °F), Min:36.1 °C (97 °F), Max:36.8 °C (98.2 °F)  - MRSA negative.  Afebrile with mild reactive leukocytosis.   - aggressive pulmonary hygiene  - monitor for s/s infection  - likely atelectasis/ postoperative in etiology  - daily CBC to follow    Hypertension  Systolic (24hrs), Av , Min:100 , Max:124   - continue BB   -  changed metoprolol to home carvedilol 6.25 BID  - adjust meds as needed    Hyperlipidemia: home med: atorvastatin 10 mg daily  Lab Results   Component Value Date    CHOL 177 2024    HDL 59.4 2024    VLDL 15 2024    TRIG 74 2024    NHDL 118 2024   -continue home statin  -follow up lipid panel with PCP/ cardio as appropriate    Hx of Crocker's esophagus and GERD. PONV - home meds: dexilant 60 mg daily, famotidine 40 mg daily  - ICU  PONV improved with Emend IV  - Passed bedside swallow and tolerating PO   - Air under right diaphragm on serial CXRs since OR as above, abdominal exam benign with no pain to palpation and nondistended. -> Dr Ramirez was notified   - Cont PPI; resume home med at dc   - continue home famotidine  - PRN Tums  - Avoid taking pills on an empty stomach  - Eat small frequent meals  - eat sitting up in the chair   - avoid acidic foods    Hx of DVT () Not on any AC currently   - OOB   - PT /OT   - Continue DVT  prophylaxis     breast CA s/p chemo/bilateral lumpectomy.   - No blood pressures or blood draws at the site of the lumpectomy     Hx of macular degeneration.   - Falls precautions     OA   - PT/OT   - Hot and cold packs  - Acetaminophen   - No NSAIDs for 3 months after cardiac surgery; if NSAIDs needed after 3 months, clear use with cardiologist before starting    VTE Prophylaxis: SCDs/TEDs, ambulation, SQ heparin  Code Status: Full Code    Dispo  - PT/OT recs Home   - Would benefit from homecare RN for cardiac surgery carepath and home PT  - Anticipate discharge tomorrow if rhythm remains stable  -  Will continue to assess discharge needs    YOSVANY Petersen-CNP  Cardiac Surgery DANNY  [facility]  Team Phone [phone]

## 2024-11-09 NOTE — CARE PLAN
The patient's goals for the shift include rest    The clinical goals for the shift include patient will remain HDS throughout shift      Problem: Skin  Goal: Decreased wound size/increased tissue granulation at next dressing change  Outcome: Progressing  Goal: Participates in plan/prevention/treatment measures  Outcome: Progressing  Goal: Prevent/manage excess moisture  Outcome: Progressing  Goal: Prevent/minimize sheer/friction injuries  Outcome: Progressing  Goal: Promote/optimize nutrition  Outcome: Progressing  Goal: Promote skin healing  Outcome: Progressing     Problem: Safety - Adult  Goal: Free from fall injury  Outcome: Progressing     Problem: Discharge Planning  Goal: Discharge to home or other facility with appropriate resources  Outcome: Progressing     Problem: Chronic Conditions and Co-morbidities  Goal: Patient's chronic conditions and co-morbidity symptoms are monitored and maintained or improved  Outcome: Progressing     Problem: Respiratory  Goal: Clear secretions with interventions this shift  Outcome: Progressing  Goal: Minimize anxiety/maximize coping throughout shift  Outcome: Progressing  Goal: Minimal/no exertional discomfort or dyspnea this shift  Outcome: Progressing  Goal: No signs of respiratory distress (eg. Use of accessory muscles. Peds grunting)  Outcome: Progressing  Goal: Patent airway maintained this shift  Outcome: Progressing  Goal: Tolerate mechanical ventilation evidenced by VS/agitation level this shift  Outcome: Progressing  Goal: Tolerate pulmonary toileting this shift  Outcome: Progressing  Goal: Verbalize decreased shortness of breath this shift  Outcome: Progressing  Goal: Wean oxygen to maintain O2 saturation per order/standard this shift  Outcome: Progressing  Goal: Increase self care and/or family involvement in next 24 hours  Outcome: Progressing     Problem: Safety - Medical Restraint  Goal: Remains free of injury from restraints (Restraint for Interference with  Medical Device)  Outcome: Progressing  Goal: Free from restraint(s) (Restraint for Interference with Medical Device)  Outcome: Progressing     Problem: Fall/Injury  Goal: Not fall by end of shift  Outcome: Progressing  Goal: Be free from injury by end of the shift  Outcome: Progressing  Goal: Verbalize understanding of personal risk factors for fall in the hospital  Outcome: Progressing  Goal: Verbalize understanding of risk factor reduction measures to prevent injury from fall in the home  Outcome: Progressing  Goal: Use assistive devices by end of the shift  Outcome: Progressing  Goal: Pace activities to prevent fatigue by end of the shift  Outcome: Progressing     Problem: Pain  Goal: Takes deep breaths with improved pain control throughout the shift  Outcome: Progressing  Goal: Turns in bed with improved pain control throughout the shift  Outcome: Progressing  Goal: Walks with improved pain control throughout the shift  Outcome: Progressing  Goal: Performs ADL's with improved pain control throughout shift  Outcome: Progressing  Goal: Participates in PT with improved pain control throughout the shift  Outcome: Progressing  Goal: Free from opioid side effects throughout the shift  Outcome: Progressing  Goal: Free from acute confusion related to pain meds throughout the shift  Outcome: Progressing

## 2024-11-10 ENCOUNTER — DOCUMENTATION (OUTPATIENT)
Dept: HOME HEALTH SERVICES | Facility: HOME HEALTH | Age: 69
End: 2024-11-10
Payer: MEDICARE

## 2024-11-10 VITALS
SYSTOLIC BLOOD PRESSURE: 122 MMHG | DIASTOLIC BLOOD PRESSURE: 80 MMHG | BODY MASS INDEX: 25.78 KG/M2 | HEIGHT: 64 IN | TEMPERATURE: 98.1 F | HEART RATE: 75 BPM | WEIGHT: 151 LBS | OXYGEN SATURATION: 93 % | RESPIRATION RATE: 19 BRPM

## 2024-11-10 LAB
ALBUMIN SERPL BCP-MCNC: 3 G/DL (ref 3.4–5)
ANION GAP SERPL CALC-SCNC: 14 MMOL/L (ref 10–20)
BUN SERPL-MCNC: 15 MG/DL (ref 6–23)
CALCIUM SERPL-MCNC: 8.6 MG/DL (ref 8.6–10.6)
CHLORIDE SERPL-SCNC: 101 MMOL/L (ref 98–107)
CO2 SERPL-SCNC: 28 MMOL/L (ref 21–32)
CREAT SERPL-MCNC: 0.72 MG/DL (ref 0.5–1.05)
EGFRCR SERPLBLD CKD-EPI 2021: >90 ML/MIN/1.73M*2
ERYTHROCYTE [DISTWIDTH] IN BLOOD BY AUTOMATED COUNT: 13.6 % (ref 11.5–14.5)
GLUCOSE SERPL-MCNC: 102 MG/DL (ref 74–99)
HCT VFR BLD AUTO: 28.1 % (ref 36–46)
HGB BLD-MCNC: 9.4 G/DL (ref 12–16)
MAGNESIUM SERPL-MCNC: 1.85 MG/DL (ref 1.6–2.4)
MCH RBC QN AUTO: 30.8 PG (ref 26–34)
MCHC RBC AUTO-ENTMCNC: 33.5 G/DL (ref 32–36)
MCV RBC AUTO: 92 FL (ref 80–100)
NRBC BLD-RTO: 0 /100 WBCS (ref 0–0)
PHOSPHATE SERPL-MCNC: 2.9 MG/DL (ref 2.5–4.9)
PLATELET # BLD AUTO: 225 X10*3/UL (ref 150–450)
POTASSIUM SERPL-SCNC: 3.6 MMOL/L (ref 3.5–5.3)
RBC # BLD AUTO: 3.05 X10*6/UL (ref 4–5.2)
SODIUM SERPL-SCNC: 139 MMOL/L (ref 136–145)
WBC # BLD AUTO: 7.5 X10*3/UL (ref 4.4–11.3)

## 2024-11-10 PROCEDURE — 2500000004 HC RX 250 GENERAL PHARMACY W/ HCPCS (ALT 636 FOR OP/ED): Performed by: NURSE PRACTITIONER

## 2024-11-10 PROCEDURE — 2500000001 HC RX 250 WO HCPCS SELF ADMINISTERED DRUGS (ALT 637 FOR MEDICARE OP): Performed by: NURSE PRACTITIONER

## 2024-11-10 PROCEDURE — 2500000004 HC RX 250 GENERAL PHARMACY W/ HCPCS (ALT 636 FOR OP/ED)

## 2024-11-10 PROCEDURE — 2500000002 HC RX 250 W HCPCS SELF ADMINISTERED DRUGS (ALT 637 FOR MEDICARE OP, ALT 636 FOR OP/ED)

## 2024-11-10 PROCEDURE — 85027 COMPLETE CBC AUTOMATED: CPT | Performed by: NURSE PRACTITIONER

## 2024-11-10 PROCEDURE — 36416 COLLJ CAPILLARY BLOOD SPEC: CPT | Performed by: NURSE PRACTITIONER

## 2024-11-10 PROCEDURE — 80069 RENAL FUNCTION PANEL: CPT | Performed by: NURSE PRACTITIONER

## 2024-11-10 PROCEDURE — 99238 HOSP IP/OBS DSCHRG MGMT 30/<: CPT

## 2024-11-10 PROCEDURE — 83735 ASSAY OF MAGNESIUM: CPT | Performed by: NURSE PRACTITIONER

## 2024-11-10 RX ORDER — FUROSEMIDE 20 MG/1
20 TABLET ORAL DAILY
Qty: 5 TABLET | Refills: 0 | Status: SHIPPED | OUTPATIENT
Start: 2024-11-11 | End: 2024-12-09 | Stop reason: ALTCHOICE

## 2024-11-10 RX ORDER — LANOLIN ALCOHOL/MO/W.PET/CERES
400 CREAM (GRAM) TOPICAL DAILY
Status: DISCONTINUED | OUTPATIENT
Start: 2024-11-10 | End: 2024-11-10 | Stop reason: HOSPADM

## 2024-11-10 RX ORDER — POTASSIUM CHLORIDE 20 MEQ/1
40 TABLET, EXTENDED RELEASE ORAL ONCE
Status: COMPLETED | OUTPATIENT
Start: 2024-11-10 | End: 2024-11-10

## 2024-11-10 RX ADMIN — ASPIRIN 81 MG: 81 TABLET, COATED ORAL at 08:50

## 2024-11-10 RX ADMIN — FUROSEMIDE 20 MG: 20 TABLET ORAL at 08:50

## 2024-11-10 RX ADMIN — ACETAMINOPHEN 975 MG: 325 TABLET, FILM COATED ORAL at 05:42

## 2024-11-10 RX ADMIN — Medication 1 TABLET: at 08:50

## 2024-11-10 RX ADMIN — CARVEDILOL 6.25 MG: 3.12 TABLET, FILM COATED ORAL at 08:50

## 2024-11-10 RX ADMIN — POTASSIUM CHLORIDE 40 MEQ: 1500 TABLET, EXTENDED RELEASE ORAL at 08:51

## 2024-11-10 RX ADMIN — FAMOTIDINE 40 MG: 20 TABLET ORAL at 08:51

## 2024-11-10 RX ADMIN — PANTOPRAZOLE SODIUM 40 MG: 40 TABLET, DELAYED RELEASE ORAL at 05:42

## 2024-11-10 RX ADMIN — HEPARIN SODIUM 5000 UNITS: 5000 INJECTION, SOLUTION INTRAVENOUS; SUBCUTANEOUS at 08:51

## 2024-11-10 RX ADMIN — POLYSACCHARIDE-IRON COMPLEX 150 MG: 150 CAPSULE ORAL at 08:50

## 2024-11-10 RX ADMIN — HEPARIN SODIUM 5000 UNITS: 5000 INJECTION, SOLUTION INTRAVENOUS; SUBCUTANEOUS at 01:33

## 2024-11-10 RX ADMIN — MAGNESIUM OXIDE TAB 400 MG (241.3 MG ELEMENTAL MG) 400 MG: 400 (241.3 MG) TAB at 08:51

## 2024-11-10 ASSESSMENT — COGNITIVE AND FUNCTIONAL STATUS - GENERAL
MOBILITY SCORE: 23
DAILY ACTIVITIY SCORE: 24
CLIMB 3 TO 5 STEPS WITH RAILING: A LITTLE

## 2024-11-10 ASSESSMENT — PAIN - FUNCTIONAL ASSESSMENT
PAIN_FUNCTIONAL_ASSESSMENT: 0-10

## 2024-11-10 ASSESSMENT — PAIN SCALES - GENERAL
PAINLEVEL_OUTOF10: 0 - NO PAIN

## 2024-11-10 NOTE — HH CARE COORDINATION
Home Care received a Referral for Nursing and Physical Therapy. We have processed the referral for a Start of Care on 11/11/12-11/12/24.     If you have any questions or concerns, please feel free to contact us at [phone]. Follow the prompts, enter your five digit zip code, and you will be directed to your care team on [facility].

## 2024-11-10 NOTE — PROGRESS NOTES
"CARDIAC SURGERY DAILY PROGRESS NOTE      Kiley Ward is a 69 y.o. with PMH/PSH of macular degeneration, HTN, HLD, ascending aortic aneurysm, Crocker's esophagus, GERD, previous DVT in 2019 (treated with Eliquis x 6 months and now off), breast CA s/p bilateral lumpectomy and chemo who was getting an outpatient routine CT calcium score screening and was incidentally found on imaging to have an ascending aortic aneurysm.       Now presents to the CTICU from the OR s/p Bentall's procedure, posterior pericardiotomy with Dr. Ramirez on 11/5/2025.    Operative Procedures Dr Ramirez on 11/5/24  Median sternotomy, composite replacement of aortic root and ascending aorta with 2 23 mm Availnex aortic valve and 28 mm Gelweave graft, reimplantation of coronary arteries, and posterior pericardiotomy    CTICU course   Transferred to Lt3 on 11/6  ==================    Interval History:   Transferred to [facility]3 on 11/6. Noted to have pneumoperitoneum, stable since OR.   Uneventful night    SUBJECTIVE:  No complaints; states no pain    Objective   BP (!) 151/92 (BP Location: Left arm, Patient Position: Sitting)   Pulse 78   Temp 37 °C (98.6 °F) (Temporal)   Resp 18   Ht 1.626 m (5' 4\")   Wt 68.5 kg (151 lb)   SpO2 94%   BMI 25.92 kg/m²   0-10 (Numeric) Pain Score: 0 - No pain   3 Day Weight Change: -0.514 kg (-1 lb 2.1 oz) per day    Intake and Output    Intake/Output Summary (Last 24 hours) at 11/10/2024 0821  Last data filed at 11/10/2024 0538  Gross per 24 hour   Intake 600 ml   Output 800 ml   Net -200 ml       Physical Exam  Physical Exam  Vitals and nursing note reviewed.   Constitutional:       General: She is not in acute distress.     Comments: Sitting in bed   HENT:      Mouth/Throat:      Mouth: Mucous membranes are moist.   Eyes:      Conjunctiva/sclera: Conjunctivae normal.   Cardiovascular:      Rate and Rhythm: Normal rate and regular rhythm.      Pulses: Normal pulses.      Heart sounds: Murmur (LLSB) heard.      " Systolic murmur is present with a grade of 3/6.      Comments: TELE - SR 70s-70s  Wires capped  Pulmonary:      Effort: Pulmonary effort is normal.      Breath sounds: Normal breath sounds.      Comments: On RA  Sternum stable  Abdominal:      General: Abdomen is flat. Bowel sounds are normal.      Palpations: Abdomen is soft.      Tenderness: There is no abdominal tenderness.      Comments:  11/9   Genitourinary:     Comments: Voiding independently  Musculoskeletal:      Cervical back: Neck supple.      Right lower leg: No edema.      Left lower leg: No edema.   Skin:     General: Skin is warm and dry.      Capillary Refill: Capillary refill takes less than 2 seconds.      Comments: MSI open to air, well approximated and without s/s infection. No drainage.    Neurological:      General: No focal deficit present.      Mental Status: She is alert and oriented to person, place, and time.   Psychiatric:         Mood and Affect: Mood normal.         Behavior: Behavior normal.         Medications  Scheduled medications  acetaminophen, 975 mg, oral, q8h GARY  aspirin 81 mg oral daily  atorvastatin, 10 mg, oral, Nightly  docusate sodium, 100 mg, oral, BID  famotidine, 40 mg, oral, Daily  furosemide, 20 mg, oral, Daily  heparin, 5,000 Units, subcutaneous, q8h  iron polysaccharides, 150 mg, oral, Daily  lidocaine, 1 patch, transdermal, q24h  metoprolol tartrate, 12.5 mg, oral, BID  multivitamin with minerals, 1 tablet, oral, Daily  pantoprazole, 40 mg, oral, Daily before breakfast    Continuous medications   PRN medications  PRN medications: benzocaine-menthol, bisacodyl, calcium carbonate, ondansetron, oxygen, phenoL, polyethylene glycol    Labs  Results for orders placed or performed during the hospital encounter of 11/05/24 (from the past 24 hours)   Magnesium   Result Value Ref Range    Magnesium 1.85 1.60 - 2.40 mg/dL   CBC   Result Value Ref Range    WBC 7.5 4.4 - 11.3 x10*3/uL    nRBC 0.0 0.0 - 0.0 /100 WBCs    RBC  3.05 (L) 4.00 - 5.20 x10*6/uL    Hemoglobin 9.4 (L) 12.0 - 16.0 g/dL    Hematocrit 28.1 (L) 36.0 - 46.0 %    MCV 92 80 - 100 fL    MCH 30.8 26.0 - 34.0 pg    MCHC 33.5 32.0 - 36.0 g/dL    RDW 13.6 11.5 - 14.5 %    Platelets 225 150 - 450 x10*3/uL   Renal Function Panel   Result Value Ref Range    Glucose 102 (H) 74 - 99 mg/dL    Sodium 139 136 - 145 mmol/L    Potassium 3.6 3.5 - 5.3 mmol/L    Chloride 101 98 - 107 mmol/L    Bicarbonate 28 21 - 32 mmol/L    Anion Gap 14 10 - 20 mmol/L    Urea Nitrogen 15 6 - 23 mg/dL    Creatinine 0.72 0.50 - 1.05 mg/dL    eGFR >90 >60 mL/min/1.73m*2    Calcium 8.6 8.6 - 10.6 mg/dL    Phosphorus 2.9 2.5 - 4.9 mg/dL    Albumin 3.0 (L) 3.4 - 5.0 g/dL         IMAGING/ DIAGNOSTIC TESTING:  I have personally reviewed the following test result(s):      XR chest 2 views 11/08/2024  Impression  1.  Small bilateral pleural effusions which appear greater on the left than right.  2. Probable subsegmental atelectasis left lower lobe behind the heart.  3. Moderate cardiomegaly, underlying pericardial effusion not excluded.    Transthoracic Echo (TTE) Complete With Contrast 11/08/2024  CONCLUSIONS:  1. Left ventricular ejection fraction is normal, by visual estimate at 55-60%.  2. Spectral Doppler shows a Grade II (pseudonormal pattern) of left ventricular diastolic filling with an elevated left atrial pressure.  3. Abnormal septal motion consistent with post-operative status and right ventricular volume overload.  4. There is mildly reduced right ventricular systolic function.  5. Mildly enlarged right ventricle.  6. The left atrium is mildly dilated.  7. The right atrium is mild to moderately dilated.  8. There is a moderate pleural effusion.  9. Moderate tricuspid regurgitation visualized.  10. Right ventricular systolic pressure is within normal limits.  11. Prosthetic graft in the ascending aorta position.  12. Compared with study dated 11/5/2024, expected post-op changes except the TR.  **  Final **         ===============  IMPRESSION & PLAN:  ===============  POD # 5 s/p Bentall's, posterior pericardiotomy with [place] on 11/5  ascending aortic aneurysm.   - Increase activity/ ambulation; PT/OT  - Encourage IS, C/DB; respiratory therapy; wean O2 as lubna -> on RA  - Cardiac rehab referral   - Continue cardiac meds: ASA, BB, statin  - Pain and anticonstipation meds  - 2v CXR 11/8 - reviewed by Dr Ramirez  - Postop echo 11/8 - reviewed by Dr Ramirez  - CUT epicardial wires prior to discharge   - Tele until discharge  - Optimize nutrition and electrolytes    Rhythm  - Tele: NSR 70-80's  - 11/9 changed metoprolol 12.5 mg BID to home coreg  - epicardial wires  capped 11/9  - Adjust medications as tolerated    Acute Blood Loss Anemia   Recent Labs     11/10/24  0535 11/09/24  0516 11/08/24  1339 11/06/24  0015 11/05/24  1341 10/17/24  1103 09/25/24  0925   HGB 9.4* 9.9* 9.8* 11.8* 11.8* 12.4 12.9   HCT 28.1* 30.3* 29.9* 34.1* 33.6* 38.3 40.0   - MV, PO Iron x1mo  - Daily labs, transfuse as indicated    Platelets  Recent Labs     11/10/24  0535 11/09/24  0516 11/08/24  1339 11/06/24  0015 11/05/24  1341 10/17/24  1103 09/25/24  0925    199 150 165 163 296 314   - Continue to trend with daily CBCs    Volume/Electrolyte Status: Preop wt Weight: 69.2 kg (152 lb 8.9 oz)   Vitals:    11/10/24 0538   Weight: 68.5 kg (151 lb)     - Weight: 68.9, 70.5, 70.8, 70, 69.2 Kg   - 11/7 Lasix 20 IV x1   - 11/8 hold off on diuresis for relative hypotension  - 11/9 added lasix 20 mg oral daily  - Adjust diuresis as needed for postop cardiac surgery hypervolemia  - Replete electrolytes for hypokalemia/hypomagnesemia/hypophosphatemia as needed: 11/8 replaced K; 11/9 replaced K, 11/10 replaced K & mag  - Daily weights and strict I&Os  - Daily RFP while admitted    Leukocytosis - resolved  Recent Labs     11/10/24  0535 11/09/24  0516 11/08/24  1339 11/06/24  0015 11/05/24  1341 10/17/24  1103 09/25/24  0925   WBC 7.5 8.3 9.5  13.9* 12.4* 4.2* 5.4   Temp (36hrs), Av.6 °C (97.8 °F), Min:36.1 °C (97 °F), Max:37 °C (98.6 °F)  - MRSA negative.  Afebrile   - aggressive pulmonary hygiene  - monitor for s/s infection  - likely atelectasis/ postoperative in etiology  - daily CBC to follow    Hypertension  Systolic (24hrs), Av , Min:101 , Max:151   - continue BB   -  changed metoprolol to home carvedilol 6.25 BID  - adjust meds as needed    Hyperlipidemia: home med: atorvastatin 10 mg daily  Lab Results   Component Value Date    CHOL 177 2024    HDL 59.4 2024    VLDL 15 2024    TRIG 74 2024    NHDL 118 2024   -continue home statin  -follow up lipid panel with PCP/ cardio as appropriate    Hx of Crocker's esophagus and GERD. PONV - home meds: dexilant 60 mg daily, famotidine 40 mg daily  - ICU  PONV improved with Emend IV  - Passed bedside swallow and tolerating PO   - Air under right diaphragm on serial CXRs since OR as above, abdominal exam benign with no pain to palpation and nondistended. -> Dr Ramirez was notified   - Cont PPI; resume home med at dc   - continue home famotidine  - PRN Tums  - Avoid taking pills on an empty stomach  - Eat small frequent meals  - eat sitting up in the chair   - avoid acidic foods    Hx of DVT () Not on any AC currently   - OOB   - PT /OT   - Continue DVT  prophylaxis     breast CA s/p chemo/bilateral lumpectomy.   - No blood pressures or blood draws at the site of the lumpectomy     Hx of macular degeneration.   - Falls precautions     OA   - PT/OT   - Hot and cold packs  - Acetaminophen   - No NSAIDs for 3 months after cardiac surgery; if NSAIDs needed after 3 months, clear use with cardiologist before starting    VTE Prophylaxis: SCDs/TEDs, ambulation, SQ heparin  Code Status: Full Code    Dispo  - PT/OT recs Home   - Would benefit from homecare RN for cardiac surgery carepath and home PT  - Anticipate discharge today  - Will continue to assess discharge  needs    Lilly Gamino, APRN-CNP  Cardiac Surgery [facility]  [facility]  Team Phone [phone]

## 2024-11-10 NOTE — DISCHARGE SUMMARY
"Discharge Diagnosis  S/P ascending aortic aneurysm repair    Issues Requiring Follow-Up  Hypertension  Hyperlipidemia    Test Results Pending At Discharge  Pending Labs       Order Current Status    Surgical Pathology Exam In process            Hospital Course  Kiley Ward is a 69 y.o. with PMH/PSH of macular degeneration, HTN, HLD, ascending aortic aneurysm, Crocker's esophagus, GERD, previous DVT in 2019 (treated with Eliquis x 6 months and now off), breast CA s/p bilateral lumpectomy and chemo who was getting an outpatient routine CT calcium score screening and was incidentally found on imaging to have an ascending aortic aneurysm.       Now presents to the CTICU from the OR s/p Bentall's procedure, posterior pericardiotomy with Dr. Ramirez on 11/5/2025.    Operative Procedures Dr Ramirez on 11/5/24  Median sternotomy, composite replacement of aortic root and ascending aorta with 2 23 mm Availnex aortic valve and 28 mm Gelweave graft, reimplantation of coronary arteries, and posterior pericardiotomy    CTICU course   Transferred to Lt3 on 11/6  ==================  Floor Course:  - Patient was diuresed for fluid volume overload post cardiac surgery; Preop weight: Weight: 69.2 kg (152 lb 8.9 oz)kg    Vital signs and weight at discharge: /69 (BP Location: Left arm, Patient Position: Lying)   Pulse 68   Temp 36.5 °C (97.7 °F) (Temporal)   Resp 16   Ht 1.626 m (5' 4\")   Wt 69.2 kg (152 lb 8.9 oz)   SpO2 94%   BMI 26.19 kg/m²     - On ASA, statin, BB, by discharge  - Epicardial wires CUT on 11/10/24  - telemetry at discharge SR  Acute Blood Loss Anemia H&H stable at discharge HgB >10, Patient will not required Iron supplement at discharge  Hypertension -  home carvedilol 6.25 BID. Continue BB. Follow up  with PCP/ cardiologist for ongoing management  Hyperlipidemia: -continue statin, follow up lipid panel with PCP/ cardio as appropriate  Hx of Crocker's esophagus and GERD. PONV Cont Dexilant Home meds. " Pantoprazole does not work for her.  Air under right diaphragm on serial CXRs since OR as above, abdominal exam benign with no pain to palpation and nondistended. -> Dr Ramirez was notified   Hx of DVT (2019) Not on any AC  currently   Hx of macular degeneration.  Continue Falls precautions   OA - PT/OT . Hot and cold packs.  Acetaminophen , No NSAIDs for 3 months after cardiac surgery; if NSAIDs needed after 3 months, clear use with cardiologist before starting  OARRS reviewed on 11/8/24  Rx summary: 0 narcotics, 0 buprenorphine, 0 sedatives, 0 stimulants  Recent scripts:  No Rxs found     - 2v CXR done 11/8  - Postop echo done 11/8  - Cardiac rehab referral was placed  - PT recs home and low intensity therapy  - Anticipate discharge to home with homecare    Discharged on 11/10/24; POD # 5 s/p Bentall's, posterior pericardiotomy with James on 11/5.     On day of discharge, vital signs were stable and no acute distress was noted. All questions were answered. After VS and labs were reviewed it was determined the patient was stable for discharge.   ==============  Hospital day of discharge management- spent >30 minutes coordinating the discharge and counseling/educating patient and family regarding discharge instructions.  =============    Past Medical History:   Diagnosis Date    ARMD (age related macular degeneration)      Ascending aorta dilation (CMS-HCC)      Crocker's esophagus      Breast cancer (Multi) 2010     s/p lumpectomy and sentinel lymph node biopsy chemotherapy and radiation.    Clotting disorder (Multi)      DVT (deep venous thrombosis) (Multi) 2019     prior left upper extremity DVT treated with Eliquis for 6 months., unproked    GERD (gastroesophageal reflux disease)      Hx antineoplastic chemo      Hyperlipidemia      Hypertension      Hyperthyroidism      Murmur      Personal history of irradiation      PVD (posterior vitreous detachment), both eyes      Vitamin D deficiency              PSH:    Surgical History         Past Surgical History:   Procedure Laterality Date    BREAST LUMPECTOMY Bilateral      CARDIAC CATHETERIZATION N/A 09/27/2024     Procedure: Left Heart Cath with Coronary Angiography and LV;  Surgeon: Raul Muniz MD;  Location: [facility] Cardiac [facility] Lab;  Service: Cardiovascular;  Laterality: N/A;    COLONOSCOPY        ESOPHAGOGASTRODUODENOSCOPY   08/2023         Social history:   Social History               Socioeconomic History    Marital status:        Spouse name: Not on file    Number of children: Not on file    Years of education: Not on file    Highest education level: Not on file   Occupational History    Not on file   Tobacco Use    Smoking status: Never    Smokeless tobacco: Never   Vaping Use    Vaping status: Never Used   Substance and Sexual Activity    Alcohol use: Yes       Alcohol/week: 4.0 standard drinks of alcohol       Types: 4 Glasses of wine per week    Drug use: Never    Sexual activity: Defer   Other Topics Concern    Not on file   Social History Narrative    Not on file      Social Drivers of Health           Financial Resource Strain: Patient Unable To Answer (11/5/2024)     Overall Financial Resource Strain (CARDIA)      Difficulty of Paying Living Expenses: Patient unable to answer   Food Insecurity: Patient Unable To Answer (11/5/2024)     Hunger Vital Sign      Worried About Running Out of Food in the Last Year: Patient unable to answer      Ran Out of Food in the Last Year: Patient unable to answer   Transportation Needs: Patient Unable To Answer (11/5/2024)     PRAPARE - Transportation      Lack of Transportation (Medical): Patient unable to answer      Lack of Transportation (Non-Medical): Patient unable to answer   Physical Activity: Not on file   Stress: Patient Unable To Answer (11/5/2024)     [employer] [place] - Occupational Stress Questionnaire      Feeling of Stress : Patient unable to answer   Social Connections: Patient  Unable To Answer (11/5/2024)     Social Connection and Isolation Panel [NHANES]      Frequency of Communication with Friends and Family: Patient unable to answer      Frequency of Social Gatherings with Friends and Family: Patient unable to answer      Attends [employer] Services: Patient unable to answer      Active Member of Clubs or Organizations: Patient unable to answer      Attends Club or Organization Meetings: Patient unable to answer      Marital Status: Patient unable to answer   Intimate Partner Violence: Patient Unable To Answer (11/5/2024)     Humiliation, Afraid, Rape, and Kick questionnaire      Fear of Current or Ex-Partner: Patient unable to answer      Emotionally Abused: Patient unable to answer      Physically Abused: Patient unable to answer      Sexually Abused: Patient unable to answer   Housing Stability: Patient Unable To Answer (11/5/2024)     Housing Stability Vital Sign      Unable to Pay for Housing in the Last Year: Patient unable to answer      Number of Times Moved in the Last Year: 0      Homeless in the Last Year: Patient unable to answer         Family history:  Family History          Family History   Problem Relation Name Age of Onset    Breast cancer Mother        Prostate cancer Father        Breast cancer Sister        Glaucoma Neg Hx             Allergies:   RX Allergies   No Known Allergies        Home meds:   Prescriptions Prior to Admission           Medications Prior to Admission   Medication Sig Dispense Refill Last Dose/Taking    atorvastatin (Lipitor) 10 mg tablet Take 1 tablet (10 mg) by mouth once daily at bedtime. 90 tablet 1 11/4/2024    calcitriol (Rocaltrol) 0.25 mcg capsule Take 1 capsule (0.25 mcg) by mouth once daily.     Past Week    carvedilol (Coreg) 6.25 mg tablet Take 1 tablet (6.25 mg) by mouth 2 times daily (morning and late afternoon). 180 tablet 3 11/5/2024 Morning    dexlansoprazole (Dexilant) 60 mg DR capsule Take 1 capsule (60 mg) by mouth once  daily. 90 capsule 1 11/5/2024 Morning    ergocalciferol (Vitamin D-2) 1.25 MG (36349 UT) capsule 1 capsule (50,000 Units) every 14 (fourteen) days.     Past Week    famotidine (Pepcid) 40 mg tablet Take 1 tablet (40 mg) by mouth once daily.     11/4/2024    mv-min/FA/vit K/lutein/zeaxant (PRESERVISION AREDS 2 PLUS MV ORAL) Take 1 capsule by mouth once daily.     Past Week         Pertinent Physical Exam At Time of Discharge  Physical Exam  Vitals reviewed.   Constitutional:       Appearance: Normal appearance.   HENT:      Mouth/Throat:      Mouth: Mucous membranes are moist.   Eyes:      Extraocular Movements: Extraocular movements intact.      Conjunctiva/sclera: Conjunctivae normal.   Cardiovascular:      Rate and Rhythm: Normal rate and regular rhythm.      Heart sounds: Normal heart sounds.      Comments: SR 70's  Pulmonary:      Effort: Pulmonary effort is normal.      Breath sounds: Normal breath sounds.   Abdominal:      Palpations: Abdomen is soft.      Comments: BM 11/9   Musculoskeletal:      Right lower leg: Edema present.      Left lower leg: Edema present.      Comments: Trace edema   Skin:     General: Skin is warm and dry.      Comments: MSI intact, well approximated, CONSTANCE.    Neurological:      General: No focal deficit present.      Mental Status: She is alert and oriented to person, place, and time.   Psychiatric:         Behavior: Behavior normal.         Thought Content: Thought content normal.         Home Medications     Medication List      START taking these medications     acetaminophen 325 mg tablet; Commonly known as: Tylenol; Take 2 tablets   (650 mg) by mouth every 6 hours if needed for mild pain (1 - 3) or   moderate pain (4 - 6).   aspirin 81 mg EC tablet; Take 1 tablet (81 mg) by mouth once daily.   docusate sodium 100 mg capsule; Commonly known as: Colace; Take 1   capsule (100 mg) by mouth 2 times a day as needed for constipation (hard   stools).   furosemide 20 mg tablet; Commonly  known as: Lasix; Take 1 tablet (20 mg)   by mouth once daily for 5 days.; Start taking on: November 11, 2024   multivitamin with minerals tablet; Take 1 tablet by mouth once daily.   polyethylene glycol 17 gram packet; Commonly known as: Glycolax,   Miralax; Take 17 g by mouth once daily as needed (constipation).     CONTINUE taking these medications     atorvastatin 10 mg tablet; Commonly known as: Lipitor; Take 1 tablet (10   mg) by mouth once daily at bedtime.   calcitriol 0.25 mcg capsule; Commonly known as: Rocaltrol   carvedilol 6.25 mg tablet; Commonly known as: Coreg; Take 1 tablet (6.25   mg) by mouth 2 times daily (morning and late afternoon).   dexlansoprazole 60 mg DR capsule; Commonly known as: Dexilant; Take 1   capsule (60 mg) by mouth once daily.   ergocalciferol 1.25 MG (69798 UT) capsule; Commonly known as: Vitamin   D-2   famotidine 40 mg tablet; Commonly known as: Pepcid   PRESERVISION AREDS 2 PLUS MV ORAL       Outpatient Follow-Up  Future Appointments   Date Time Provider Department Center   12/9/2024  1:40 PM [facility] [id number] NURSE [id number] Academic   12/12/2024  8:15 AM Leon Ramirez MD [id number] Academic   12/16/2024  9:00 AM Brenda Rodrigues [facility] [id number] [facility]   1/10/2025  8:00 AM Efrain Ramirez MD [id number] Academic   2/24/2025  8:30 AM Indra Torres MD [id number] None   4/14/2025  1:00 PM Eloise Naylor DO [id number] [facility]   [facility]19/2025  [facility] [id number] Rad   8/19/2025 10:30 AM Nicky Mayers APRN-CNP [id number] [facility]       Lilly Gamino APRN-CNP

## 2024-11-10 NOTE — PROGRESS NOTES
11/10/24 1308   Discharge Planning   Home or Post Acute Services In home services   Type of Home Care Services Home OT;Home PT;Home nursing visits   Expected Discharge Disposition Home H  ([facility])     Transitional Care Coordination Discharge Planning Note:  TCC made aware patient medically ready for discharge 11/10  TCC sent notification to [facility] nurse discharge order placed   IIMM was signed on 11/9    TCC will continue to follow and update the plane as warranted.    SAMMIE LundbergN-RN  Transitional Care Coordinator (TCC)  [phone] ext [phone]

## 2024-11-11 ENCOUNTER — HOME CARE VISIT (OUTPATIENT)
Dept: HOME HEALTH SERVICES | Facility: HOME HEALTH | Age: 69
End: 2024-11-11
Payer: MEDICARE

## 2024-11-11 DIAGNOSIS — Z98.890 STATUS POST AORTA REPAIR: ICD-10-CM

## 2024-11-11 LAB
AORTIC VALVE MEAN GRADIENT: 16 MMHG
AORTIC VALVE PEAK VELOCITY: 2.69 M/S
AV PEAK GRADIENT: 29 MMHG
AVA (PEAK VEL): 3.02 CM2
AVA (VTI): 2.87 CM2
BODY SURFACE AREA: 1.76 M2
EJECTION FRACTION APICAL 4 CHAMBER: 50.7
EJECTION FRACTION: 58 %
LEFT VENTRICLE INTERNAL DIMENSION DIASTOLE: 3.7 CM (ref 3.5–6)
LEFT VENTRICULAR OUTFLOW TRACT DIAMETER: 2.2 CM
MITRAL VALVE E/A RATIO: 1.24
RIGHT VENTRICLE FREE WALL PEAK S': 8.92 CM/S
RIGHT VENTRICLE PEAK SYSTOLIC PRESSURE: 27.6 MMHG
TRICUSPID ANNULAR PLANE SYSTOLIC EXCURSION: 1.5 CM

## 2024-11-11 PROCEDURE — 1090000001 HH PPS REVENUE CREDIT

## 2024-11-11 PROCEDURE — G0299 HHS/HOSPICE OF RN EA 15 MIN: HCPCS | Mod: HHH

## 2024-11-11 PROCEDURE — 169592 NO-PAY CLAIM PROCEDURE

## 2024-11-11 PROCEDURE — 0023 HH SOC

## 2024-11-11 PROCEDURE — 1090000002 HH PPS REVENUE DEBIT

## 2024-11-12 PROCEDURE — 1090000001 HH PPS REVENUE CREDIT

## 2024-11-12 PROCEDURE — 1090000002 HH PPS REVENUE DEBIT

## 2024-11-12 NOTE — CASE COMMUNICATION
Start of care today for this patient who had recent surgery and was discharged from hospital the day prior.     Patient states (and her  agrees) that no one talked with them about what homecare entails.  After completing start of care, patient states she doesnt believe she is going to need homecare, but agreed to today's visit and quite possibly, one more for discharge.    She then states she agrees to today's visit and getting s tarted but will think about it and get back to me later this week as to whether she wants additional visits.    She is ambulating without any difficulty and although weak and fatigued, and although I have encouraged therapy to help with strength and mobility, she has adamantly refused physical and/or occupational therapies.    Her  is home with her, although he works full time, he is self-employed and able to stay home when neede d.  Their adult children are nearby and can assist.    She has no pain, discomfort.    Uploaded  pictures of wound to media for reference and documentation purposes.    Should patient change her mind and want homecare longer than the visit set that I have established, I will modify and then Azul HERNANDEZ can take over for the case as .    At this time (to the office), please keep her assigned to me so that I can follow up and di scharge her if needed.    Additionally, she has labwork ordered for after discharge. She is declining homecare to obtain labs and wants to go to the  outpatient lab in [place].

## 2024-11-13 VITALS
BODY MASS INDEX: 25.1 KG/M2 | HEIGHT: 64 IN | DIASTOLIC BLOOD PRESSURE: 62 MMHG | TEMPERATURE: 98.1 F | OXYGEN SATURATION: 97 % | HEART RATE: 66 BPM | SYSTOLIC BLOOD PRESSURE: 118 MMHG | WEIGHT: 147 LBS | RESPIRATION RATE: 20 BRPM

## 2024-11-13 DIAGNOSIS — I71.21 ANEURYSM OF ASCENDING AORTA WITHOUT RUPTURE (CMS-HCC): ICD-10-CM

## 2024-11-13 DIAGNOSIS — E78.5 HYPERLIPIDEMIA, UNSPECIFIED HYPERLIPIDEMIA TYPE: ICD-10-CM

## 2024-11-13 DIAGNOSIS — D64.9 ANEMIA, UNSPECIFIED TYPE: ICD-10-CM

## 2024-11-13 PROCEDURE — 1090000002 HH PPS REVENUE DEBIT

## 2024-11-13 PROCEDURE — 1090000001 HH PPS REVENUE CREDIT

## 2024-11-13 ASSESSMENT — ENCOUNTER SYMPTOMS
FATIGUES EASILY: 1
PAIN LOCATION - PAIN SEVERITY: 1/10
PERSON REPORTING PAIN: PATIENT
PAIN LOCATION - PAIN QUALITY: SORE AT TIMES
OCCASIONAL FEELINGS OF UNSTEADINESS: 0
PAIN LOCATION - PAIN FREQUENCY: INFREQUENT
LIMITED RANGE OF MOTION: 1
PAIN LOCATION: CHEST
PAIN LOCATION - PAIN DURATION: SINCE SURGERY
LOSS OF SENSATION IN FEET: 0
DYSPNEA ACTIVITY LEVEL: AFTER AMBULATING 10 - 20 FT
LOWEST PAIN SEVERITY IN PAST 24 HOURS: 0/10
MUSCLE WEAKNESS: 1
SHORTNESS OF BREATH: 1
APPETITE LEVEL: GOOD
DEPRESSION: 0
PAIN: 1
CHANGE IN APPETITE: UNCHANGED
PAIN SEVERITY GOAL: 0/10
HIGHEST PAIN SEVERITY IN PAST 24 HOURS: 1/10
PAIN LOCATION - RELIEVING FACTORS: REST, TYLENOL
PAIN LOCATION - EXACERBATING FACTORS: MOVEMENT, DOING TOO MUCH

## 2024-11-13 ASSESSMENT — ACTIVITIES OF DAILY LIVING (ADL)
CURRENT_FUNCTION: ONE PERSON
TOILETING: 1
AMBULATION ASSISTANCE: ONE PERSON
ADLS_COMMENTS: NO ASSISTIVE DEVICE
OASIS_M1830: 03
AMBULATION ASSISTANCE: 1
TOILETING: ONE PERSON
PHYSICAL TRANSFERS ASSESSED: 1
ENTERING_EXITING_HOME: ONE PERSON

## 2024-11-14 PROCEDURE — 1090000002 HH PPS REVENUE DEBIT

## 2024-11-14 PROCEDURE — 1090000001 HH PPS REVENUE CREDIT

## 2024-11-15 PROCEDURE — 1090000001 HH PPS REVENUE CREDIT

## 2024-11-15 PROCEDURE — 1090000002 HH PPS REVENUE DEBIT

## 2024-11-16 PROCEDURE — 1090000002 HH PPS REVENUE DEBIT

## 2024-11-16 PROCEDURE — G0180 MD CERTIFICATION HHA PATIENT: HCPCS | Performed by: THORACIC SURGERY (CARDIOTHORACIC VASCULAR SURGERY)

## 2024-11-16 PROCEDURE — 1090000001 HH PPS REVENUE CREDIT

## 2024-11-17 ENCOUNTER — HOME CARE VISIT (OUTPATIENT)
Dept: HOME HEALTH SERVICES | Facility: HOME HEALTH | Age: 69
End: 2024-11-17
Payer: MEDICARE

## 2024-11-18 LAB
LABORATORY COMMENT REPORT: NORMAL
PATH REPORT.FINAL DX SPEC: NORMAL
PATH REPORT.GROSS SPEC: NORMAL
PATH REPORT.RELEVANT HX SPEC: NORMAL
PATH REPORT.TOTAL CANCER: NORMAL

## 2024-11-18 ASSESSMENT — ACTIVITIES OF DAILY LIVING (ADL)
CURRENT_FUNCTION: INDEPENDENT
AMBULATION ASSISTANCE: 1
AMBULATION ASSISTANCE: INDEPENDENT
HOME_HEALTH_OASIS: 00
PHYSICAL TRANSFERS ASSESSED: 1
TOILETING: INDEPENDENT
BATHING_CURRENT_FUNCTION: INDEPENDENT
TOILETING: 1
OASIS_M1830: 00
BATHING ASSESSED: 1

## 2024-11-18 NOTE — CASE COMMUNICATION
Patient is declining any further homecare.  Requesting discharge without a visit.     This nurse will complete the agency discharge without a visit since I am the only clinician that has seen the patient for this episode.    Patient to follow up with physicians.

## 2024-11-19 ENCOUNTER — LAB (OUTPATIENT)
Dept: LAB | Facility: LAB | Age: 69
End: 2024-11-19
Payer: MEDICARE

## 2024-11-19 DIAGNOSIS — E78.5 HYPERLIPIDEMIA, UNSPECIFIED HYPERLIPIDEMIA TYPE: ICD-10-CM

## 2024-11-19 DIAGNOSIS — D64.9 ANEMIA, UNSPECIFIED TYPE: ICD-10-CM

## 2024-11-19 DIAGNOSIS — I71.21 ANEURYSM OF ASCENDING AORTA WITHOUT RUPTURE (CMS-HCC): ICD-10-CM

## 2024-11-19 LAB
ALBUMIN SERPL BCP-MCNC: 3.3 G/DL (ref 3.4–5)
ALP SERPL-CCNC: 733 U/L (ref 33–136)
ALT SERPL W P-5'-P-CCNC: 109 U/L (ref 7–45)
ANION GAP SERPL CALC-SCNC: 12 MMOL/L (ref 10–20)
AST SERPL W P-5'-P-CCNC: 46 U/L (ref 9–39)
BASOPHILS # BLD AUTO: 0.05 X10*3/UL (ref 0–0.1)
BASOPHILS NFR BLD AUTO: 0.6 %
BILIRUB SERPL-MCNC: 0.6 MG/DL (ref 0–1.2)
BUN SERPL-MCNC: 12 MG/DL (ref 6–23)
CALCIUM SERPL-MCNC: 8.4 MG/DL (ref 8.6–10.3)
CHLORIDE SERPL-SCNC: 100 MMOL/L (ref 98–107)
CO2 SERPL-SCNC: 27 MMOL/L (ref 21–32)
CREAT SERPL-MCNC: 0.8 MG/DL (ref 0.5–1.05)
EGFRCR SERPLBLD CKD-EPI 2021: 81 ML/MIN/1.73M*2
EOSINOPHIL # BLD AUTO: 0.18 X10*3/UL (ref 0–0.7)
EOSINOPHIL NFR BLD AUTO: 2 %
ERYTHROCYTE [DISTWIDTH] IN BLOOD BY AUTOMATED COUNT: 14 % (ref 11.5–14.5)
GLUCOSE SERPL-MCNC: 101 MG/DL (ref 74–99)
HCT VFR BLD AUTO: 29.1 % (ref 36–46)
HGB BLD-MCNC: 9.2 G/DL (ref 12–16)
IMM GRANULOCYTES # BLD AUTO: 0.07 X10*3/UL (ref 0–0.7)
IMM GRANULOCYTES NFR BLD AUTO: 0.8 % (ref 0–0.9)
LYMPHOCYTES # BLD AUTO: 0.96 X10*3/UL (ref 1.2–4.8)
LYMPHOCYTES NFR BLD AUTO: 10.7 %
MCH RBC QN AUTO: 30.7 PG (ref 26–34)
MCHC RBC AUTO-ENTMCNC: 31.6 G/DL (ref 32–36)
MCV RBC AUTO: 97 FL (ref 80–100)
MONOCYTES # BLD AUTO: 0.93 X10*3/UL (ref 0.1–1)
MONOCYTES NFR BLD AUTO: 10.4 %
NEUTROPHILS # BLD AUTO: 6.77 X10*3/UL (ref 1.2–7.7)
NEUTROPHILS NFR BLD AUTO: 75.5 %
NRBC BLD-RTO: 0 /100 WBCS (ref 0–0)
PLATELET # BLD AUTO: 777 X10*3/UL (ref 150–450)
POTASSIUM SERPL-SCNC: 4 MMOL/L (ref 3.5–5.3)
PROT SERPL-MCNC: 7 G/DL (ref 6.4–8.2)
RBC # BLD AUTO: 3 X10*6/UL (ref 4–5.2)
RBC MORPH BLD: NORMAL
SODIUM SERPL-SCNC: 135 MMOL/L (ref 136–145)
WBC # BLD AUTO: 9 X10*3/UL (ref 4.4–11.3)

## 2024-11-19 PROCEDURE — 80053 COMPREHEN METABOLIC PANEL: CPT

## 2024-11-19 PROCEDURE — 36415 COLL VENOUS BLD VENIPUNCTURE: CPT

## 2024-11-19 PROCEDURE — 85025 COMPLETE CBC W/AUTO DIFF WBC: CPT

## 2024-11-19 NOTE — PROGRESS NOTES
Subjective   Patient ID: Kiley Ward is a 69 y.o. female who presents for Hospital Follow-up.    HPI   Post hospital follow up  Patient presents today  for a post hospitalization follow up.   She underwent an elective Aortic valve replacement, and Aortic root and Ascending aorta graft replacement with DR. Ramirez, on 11/05/2024  Screening CT cardiac calcium score in Feb 2024 visualized a dilatation of the ascending aorta 5.2 cm, which was confirmed with CT angio.  9/27/2024 Coronary angiography demonstrated normal coronary arteries without any significant atherosclerosis.   In addition echocardiography demonstrated moderate aortic insufficiency.   After the procedure, the patient was transferred to the MICU for close monitoring  Patient had an uncomplicated hospital course where she received adequate post heart surgery care.  She was discharged home on 11/10/2024 with referral to cardiac rehab and home care which per chart review was declined.  She was started on ASA 81 mg and furosemide 20 mg daily, and recommended to continue Lipitor 10 mg daily and Coreg 6.25 mg BID   During the last 10 days at home patient admits of slowly recovering.  She complains of fatigue but is able to move around the house without much issues.  States that her previous diet gives her diarrhea so she is kind limited in her food options for now.  Denies any fever or chills, denies any chest pain or shortness of breath.  Admits of hearing her heart beating stronger at times, but with normal rate and rhythm  Has not been using her furosemide due to going back to her dry weight and blood pressure breathing controlled  Blood pressure today in office was 94/70 with a heart rate of 82    Review of Systems  All pertinent positive symptoms are included in the history of present illness.    All other systems have been reviewed and are negative and noncontributory to this patient's current ailments.    Objective   BP 94/70 (BP Location: Left arm,  Patient Position: Sitting, BP Cuff Size: Adult)   Pulse 82   Wt 64.1 kg (141 lb 6.4 oz)   SpO2 96%   BMI 24.27 kg/m²     Physical Exam  Constitutional:       General: She is not in acute distress.     Appearance: Normal appearance. She is normal weight. She is not ill-appearing.   HENT:      Head: Normocephalic and atraumatic.      Right Ear: External ear normal.      Left Ear: External ear normal.      Nose: Nose normal. No congestion or rhinorrhea.      Mouth/Throat:      Mouth: Mucous membranes are moist.      Pharynx: Oropharynx is clear. No oropharyngeal exudate or posterior oropharyngeal erythema.   Eyes:      General: No scleral icterus.     Extraocular Movements: Extraocular movements intact.      Conjunctiva/sclera: Conjunctivae normal.      Pupils: Pupils are equal, round, and reactive to light.   Cardiovascular:      Rate and Rhythm: Normal rate and regular rhythm.      Pulses: Normal pulses.      Heart sounds: Normal heart sounds. No murmur heard.  Pulmonary:      Effort: Pulmonary effort is normal. No respiratory distress.      Breath sounds: Normal breath sounds. No wheezing, rhonchi or rales.   Abdominal:      General: Abdomen is flat. Bowel sounds are normal.      Palpations: Abdomen is soft.      Tenderness: There is no abdominal tenderness. There is no guarding or rebound.   Musculoskeletal:         General: No deformity. Normal range of motion.      Right lower leg: No edema.      Left lower leg: No edema.      Comments: Mid thoracic surgical scar healing without any signs of inflammation or discharge   Skin:     General: Skin is warm and dry.      Capillary Refill: Capillary refill takes less than 2 seconds.      Findings: No lesion or rash.   Neurological:      General: No focal deficit present.      Mental Status: She is alert and oriented to person, place, and time. Mental status is at baseline.   Psychiatric:         Mood and Affect: Mood normal.         Behavior: Behavior normal.           Assessment/Plan   Diagnoses and all orders for this visit:  Aneurysm of ascending aorta without rupture (CMS-[facility])  S/P ascending aortic aneurysm repair  Primary hypertension  ABLA (acute blood loss anemia)  Elevated liver enzymes  Elevated alkaline phosphatase level  Thrombocytosis  -     CBC and Auto Differential; Future  -     Comprehensive metabolic panel; Future  Patient presented today in office as a posthospitalization follow-up.  She is recovering very well with minimal complaints of fatigue and occasional diarrhea  Otherwise no signs of cardiovascular symptoms.  Discussed about her strong heartbeats at times which can be multifactorial including secondary to heart surgery, patient being more aware of the situation, or due to Coreg slowing the rate and increasing diastolic filling time.  Blood pressure is well-controlled, discussed about importance of keeping blood pressure on the lower side to reduce complication from above surgery  Discussed fatigue can be secondary to blood loss anemia during surgery, patient would need time to recover.  At this moment I do not believe she would need iron supplements, RDW was normal.  Discussed about thrombocytosis that can be as a reactive response due to massive surgery.  Will monitor closely CBC early next week  Discussed about her elevated liver enzymes being as a result of hypoperfusion during surgery as well as being for a long time under the effect of anesthesia medications.  There are no concerns for acute toxic liver.  We will monitor her liver enzymes closely with a CMP early next week  Also discussed about her elevated alkaline phosphatase being secondary to cutting open her sternum.  Patient will have close follow-up with cardiology and thoracic surgery in early December.  Otherwise we recommend advancing diet as tolerated and increasing physical activity as tolerated as well.    Thank you for letting us be a part of your care team.  Please call the office  if you have further questions or concerns regarding your care    Barrera Yan MD  PGY2, FM Resident

## 2024-11-20 ENCOUNTER — APPOINTMENT (OUTPATIENT)
Dept: PRIMARY CARE | Facility: CLINIC | Age: 69
End: 2024-11-20
Payer: MEDICARE

## 2024-11-20 VITALS
SYSTOLIC BLOOD PRESSURE: 94 MMHG | OXYGEN SATURATION: 96 % | BODY MASS INDEX: 24.27 KG/M2 | HEART RATE: 82 BPM | DIASTOLIC BLOOD PRESSURE: 70 MMHG | WEIGHT: 141.4 LBS

## 2024-11-20 DIAGNOSIS — Z86.79 S/P ASCENDING AORTIC ANEURYSM REPAIR: ICD-10-CM

## 2024-11-20 DIAGNOSIS — I71.21 ANEURYSM OF ASCENDING AORTA WITHOUT RUPTURE (CMS-HCC): Primary | ICD-10-CM

## 2024-11-20 DIAGNOSIS — D75.839 THROMBOCYTOSIS: ICD-10-CM

## 2024-11-20 DIAGNOSIS — R74.8 ELEVATED ALKALINE PHOSPHATASE LEVEL: ICD-10-CM

## 2024-11-20 DIAGNOSIS — Z98.890 S/P ASCENDING AORTIC ANEURYSM REPAIR: ICD-10-CM

## 2024-11-20 DIAGNOSIS — D62 ABLA (ACUTE BLOOD LOSS ANEMIA): ICD-10-CM

## 2024-11-20 DIAGNOSIS — I10 PRIMARY HYPERTENSION: ICD-10-CM

## 2024-11-20 DIAGNOSIS — R74.8 ELEVATED LIVER ENZYMES: ICD-10-CM

## 2024-11-20 PROCEDURE — 1157F ADVNC CARE PLAN IN RCRD: CPT | Performed by: STUDENT IN AN ORGANIZED HEALTH CARE EDUCATION/TRAINING PROGRAM

## 2024-11-20 PROCEDURE — 3078F DIAST BP <80 MM HG: CPT | Performed by: STUDENT IN AN ORGANIZED HEALTH CARE EDUCATION/TRAINING PROGRAM

## 2024-11-20 PROCEDURE — 3074F SYST BP LT 130 MM HG: CPT | Performed by: STUDENT IN AN ORGANIZED HEALTH CARE EDUCATION/TRAINING PROGRAM

## 2024-11-20 PROCEDURE — 1159F MED LIST DOCD IN RCRD: CPT | Performed by: STUDENT IN AN ORGANIZED HEALTH CARE EDUCATION/TRAINING PROGRAM

## 2024-11-20 PROCEDURE — 1111F DSCHRG MED/CURRENT MED MERGE: CPT | Performed by: STUDENT IN AN ORGANIZED HEALTH CARE EDUCATION/TRAINING PROGRAM

## 2024-11-20 PROCEDURE — 1036F TOBACCO NON-USER: CPT | Performed by: STUDENT IN AN ORGANIZED HEALTH CARE EDUCATION/TRAINING PROGRAM

## 2024-11-20 PROCEDURE — 99495 TRANSJ CARE MGMT MOD F2F 14D: CPT | Performed by: STUDENT IN AN ORGANIZED HEALTH CARE EDUCATION/TRAINING PROGRAM

## 2024-11-20 ASSESSMENT — PATIENT HEALTH QUESTIONNAIRE - PHQ9
1. LITTLE INTEREST OR PLEASURE IN DOING THINGS: NOT AT ALL
2. FEELING DOWN, DEPRESSED OR HOPELESS: NOT AT ALL
SUM OF ALL RESPONSES TO PHQ9 QUESTIONS 1 AND 2: 0

## 2024-11-20 NOTE — RESULT ENCOUNTER NOTE
Complete blood cell count does show a continued anemia with a hemoglobin/hematocrit 9.2 and 29.1  Platelet cell count is very elevated at 777 and this could be still due to the healing process but we will discuss this in greater detail at the patient's visit today and to see how she is doing    Sugar 1 point above normal but otherwise sodium 1 point below normal but I am not fully concerned    Liver function is elevated as well as alkaline phosphatase but again this is most likely due to her recent surgery    Will discuss this in greater detail at her visit today

## 2024-11-25 ENCOUNTER — LAB (OUTPATIENT)
Dept: LAB | Facility: LAB | Age: 69
End: 2024-11-25
Payer: MEDICARE

## 2024-11-25 DIAGNOSIS — Z98.890 S/P ASCENDING AORTIC ANEURYSM REPAIR: ICD-10-CM

## 2024-11-25 DIAGNOSIS — R74.8 ELEVATED ALKALINE PHOSPHATASE LEVEL: ICD-10-CM

## 2024-11-25 DIAGNOSIS — Z98.890 S/P AORTIC ANEURYSM REPAIR: ICD-10-CM

## 2024-11-25 DIAGNOSIS — I10 PRIMARY HYPERTENSION: ICD-10-CM

## 2024-11-25 DIAGNOSIS — D62 ABLA (ACUTE BLOOD LOSS ANEMIA): ICD-10-CM

## 2024-11-25 DIAGNOSIS — Z86.79 S/P ASCENDING AORTIC ANEURYSM REPAIR: ICD-10-CM

## 2024-11-25 DIAGNOSIS — D75.839 THROMBOCYTOSIS: ICD-10-CM

## 2024-11-25 DIAGNOSIS — Z86.79 S/P AORTIC ANEURYSM REPAIR: ICD-10-CM

## 2024-11-25 DIAGNOSIS — I71.21 ASCENDING AORTIC ANEURYSM, UNSPECIFIED WHETHER RUPTURED (CMS-HCC): ICD-10-CM

## 2024-11-25 DIAGNOSIS — R74.8 ELEVATED LIVER ENZYMES: ICD-10-CM

## 2024-11-25 DIAGNOSIS — I71.21 ANEURYSM OF ASCENDING AORTA WITHOUT RUPTURE (CMS-HCC): ICD-10-CM

## 2024-11-25 DIAGNOSIS — Z95.2 S/P AVR (AORTIC VALVE REPLACEMENT) AND AORTOPLASTY: ICD-10-CM

## 2024-11-25 LAB
ALBUMIN SERPL BCP-MCNC: 3.6 G/DL (ref 3.4–5)
ALP SERPL-CCNC: 491 U/L (ref 33–136)
ALT SERPL W P-5'-P-CCNC: 42 U/L (ref 7–45)
ANION GAP SERPL CALC-SCNC: 13 MMOL/L (ref 10–20)
AST SERPL W P-5'-P-CCNC: 22 U/L (ref 9–39)
BASOPHILS # BLD AUTO: 0.09 X10*3/UL (ref 0–0.1)
BASOPHILS NFR BLD AUTO: 1.4 %
BILIRUB SERPL-MCNC: 0.4 MG/DL (ref 0–1.2)
BUN SERPL-MCNC: 16 MG/DL (ref 6–23)
CALCIUM SERPL-MCNC: 9.1 MG/DL (ref 8.6–10.3)
CHLORIDE SERPL-SCNC: 101 MMOL/L (ref 98–107)
CO2 SERPL-SCNC: 26 MMOL/L (ref 21–32)
CREAT SERPL-MCNC: 0.91 MG/DL (ref 0.5–1.05)
EGFRCR SERPLBLD CKD-EPI 2021: 69 ML/MIN/1.73M*2
EOSINOPHIL # BLD AUTO: 0.31 X10*3/UL (ref 0–0.7)
EOSINOPHIL NFR BLD AUTO: 5 %
ERYTHROCYTE [DISTWIDTH] IN BLOOD BY AUTOMATED COUNT: 13.5 % (ref 11.5–14.5)
GLUCOSE SERPL-MCNC: 96 MG/DL (ref 74–99)
HCT VFR BLD AUTO: 29.8 % (ref 36–46)
HGB BLD-MCNC: 9.2 G/DL (ref 12–16)
IMM GRANULOCYTES # BLD AUTO: 0.04 X10*3/UL (ref 0–0.7)
IMM GRANULOCYTES NFR BLD AUTO: 0.6 % (ref 0–0.9)
LYMPHOCYTES # BLD AUTO: 1.01 X10*3/UL (ref 1.2–4.8)
LYMPHOCYTES NFR BLD AUTO: 16.2 %
MAGNESIUM SERPL-MCNC: 2.07 MG/DL (ref 1.6–2.4)
MCH RBC QN AUTO: 29.4 PG (ref 26–34)
MCHC RBC AUTO-ENTMCNC: 30.9 G/DL (ref 32–36)
MCV RBC AUTO: 95 FL (ref 80–100)
MONOCYTES # BLD AUTO: 0.63 X10*3/UL (ref 0.1–1)
MONOCYTES NFR BLD AUTO: 10.1 %
NEUTROPHILS # BLD AUTO: 4.17 X10*3/UL (ref 1.2–7.7)
NEUTROPHILS NFR BLD AUTO: 66.7 %
NRBC BLD-RTO: 0 /100 WBCS (ref 0–0)
PHOSPHATE SERPL-MCNC: 4.1 MG/DL (ref 2.5–4.9)
PLATELET # BLD AUTO: 608 X10*3/UL (ref 150–450)
POTASSIUM SERPL-SCNC: 4.3 MMOL/L (ref 3.5–5.3)
PROT SERPL-MCNC: 7.7 G/DL (ref 6.4–8.2)
RBC # BLD AUTO: 3.13 X10*6/UL (ref 4–5.2)
SODIUM SERPL-SCNC: 136 MMOL/L (ref 136–145)
WBC # BLD AUTO: 6.3 X10*3/UL (ref 4.4–11.3)

## 2024-11-25 PROCEDURE — 85025 COMPLETE CBC W/AUTO DIFF WBC: CPT

## 2024-11-25 PROCEDURE — 80053 COMPREHEN METABOLIC PANEL: CPT

## 2024-11-25 PROCEDURE — 83735 ASSAY OF MAGNESIUM: CPT

## 2024-11-25 PROCEDURE — 36415 COLL VENOUS BLD VENIPUNCTURE: CPT

## 2024-11-25 PROCEDURE — 84100 ASSAY OF PHOSPHORUS: CPT

## 2024-11-26 DIAGNOSIS — D62 ABLA (ACUTE BLOOD LOSS ANEMIA): Primary | ICD-10-CM

## 2024-11-26 NOTE — RESULT ENCOUNTER NOTE
Sugar, kidneys, liver, electrolytes within normal limits at this point  Alkaline phosphatase still elevated but is trending down    Complete blood cell count continues to show anemia but it also notes that her platelet cell count is slowly going down and improving    I would recommend that she repeat the blood cell count within the next week and if this is not getting better, she needs to make sure she discusses this closely with her surgeon or even we can get her to a hematologist

## 2024-12-06 ENCOUNTER — LAB (OUTPATIENT)
Dept: LAB | Facility: LAB | Age: 69
End: 2024-12-06
Payer: MEDICARE

## 2024-12-06 DIAGNOSIS — D62 ABLA (ACUTE BLOOD LOSS ANEMIA): ICD-10-CM

## 2024-12-06 LAB
ERYTHROCYTE [DISTWIDTH] IN BLOOD BY AUTOMATED COUNT: 13.7 % (ref 11.5–14.5)
HCT VFR BLD AUTO: 30.5 % (ref 36–46)
HGB BLD-MCNC: 9.7 G/DL (ref 12–16)
MCH RBC QN AUTO: 29.8 PG (ref 26–34)
MCHC RBC AUTO-ENTMCNC: 31.8 G/DL (ref 32–36)
MCV RBC AUTO: 94 FL (ref 80–100)
NRBC BLD-RTO: 0 /100 WBCS (ref 0–0)
PLATELET # BLD AUTO: 388 X10*3/UL (ref 150–450)
RBC # BLD AUTO: 3.25 X10*6/UL (ref 4–5.2)
WBC # BLD AUTO: 4.6 X10*3/UL (ref 4.4–11.3)

## 2024-12-06 PROCEDURE — 85027 COMPLETE CBC AUTOMATED: CPT

## 2024-12-06 PROCEDURE — 36415 COLL VENOUS BLD VENIPUNCTURE: CPT

## 2024-12-08 DIAGNOSIS — Z95.2 STATUS POST AORTIC VALVE REPLACEMENT: ICD-10-CM

## 2024-12-08 DIAGNOSIS — I31.39 PERICARDIAL EFFUSION (HHS-HCC): ICD-10-CM

## 2024-12-08 PROBLEM — M25.519 SHOULDER PAIN: Status: RESOLVED | Noted: 2024-12-08 | Resolved: 2024-12-08

## 2024-12-08 PROBLEM — Z86.718 HISTORY OF DEEP VENOUS THROMBOSIS: Status: RESOLVED | Noted: 2024-12-08 | Resolved: 2024-12-08

## 2024-12-08 PROBLEM — N63.0 MASS OF BREAST: Status: RESOLVED | Noted: 2024-12-08 | Resolved: 2024-12-08

## 2024-12-08 PROBLEM — N64.52 NIPPLE DISCHARGE IN FEMALE: Status: RESOLVED | Noted: 2024-12-08 | Resolved: 2024-12-08

## 2024-12-08 PROBLEM — D72.819 LEUKOPENIA: Status: RESOLVED | Noted: 2024-12-08 | Resolved: 2024-12-08

## 2024-12-08 PROBLEM — N64.4 PAIN OF RIGHT BREAST: Status: RESOLVED | Noted: 2024-12-08 | Resolved: 2024-12-08

## 2024-12-08 PROBLEM — C50.911: Status: RESOLVED | Noted: 2024-12-08 | Resolved: 2024-12-08

## 2024-12-08 PROBLEM — M54.2 NECK PAIN: Status: RESOLVED | Noted: 2024-12-08 | Resolved: 2024-12-08

## 2024-12-08 PROBLEM — C77.3 MALIGNANT NEOPLASM METASTATIC TO LYMPH NODE OF AXILLA (MULTI): Status: RESOLVED | Noted: 2024-12-08 | Resolved: 2024-12-08

## 2024-12-08 NOTE — RESULT ENCOUNTER NOTE
Complete blood cell count does show improvement with a platelet cell count now normal at 388    Her anemia is also improving with hemoglobin at 9.7 and hematocrit at 30.5    I would recommend she follows closely with her cardiac surgeon at this time but it appears that she is slightly getting better on this regard

## 2024-12-09 ENCOUNTER — TELEMEDICINE CLINICAL SUPPORT (OUTPATIENT)
Dept: CARDIAC SURGERY | Facility: HOSPITAL | Age: 69
End: 2024-12-09
Payer: MEDICARE

## 2024-12-09 DIAGNOSIS — Z95.828 STATUS POST ASCENDING AORTIC REPLACEMENT: ICD-10-CM

## 2024-12-09 DIAGNOSIS — Z09 POSTOP CHECK: ICD-10-CM

## 2024-12-09 NOTE — PROGRESS NOTES
Contacting Kiley status post 11/5/24 root avr asc surgery. Doing very well at home. Weight today 137 lbs eating well now lost appetite initially. Stating incisions intact, no peripheral edema.  Systolic bp funning 110, heart rate 80's. Feeling tired probably due to anemia which is slowly resolving, Schedule fu ct 12/20/24, echo 1/8/25. Confirmed [facility] follow up with chest xray prior 1/20/25. Tootie Roca RN

## 2024-12-12 ENCOUNTER — APPOINTMENT (OUTPATIENT)
Dept: OPHTHALMOLOGY | Facility: CLINIC | Age: 69
End: 2024-12-12
Payer: MEDICARE

## 2024-12-16 ENCOUNTER — OFFICE VISIT (OUTPATIENT)
Dept: CARDIOLOGY | Facility: HOSPITAL | Age: 69
End: 2024-12-16
Payer: MEDICARE

## 2024-12-16 VITALS
OXYGEN SATURATION: 97 % | DIASTOLIC BLOOD PRESSURE: 80 MMHG | SYSTOLIC BLOOD PRESSURE: 130 MMHG | WEIGHT: 140 LBS | BODY MASS INDEX: 24.03 KG/M2 | HEART RATE: 85 BPM

## 2024-12-16 DIAGNOSIS — E78.5 HYPERLIPIDEMIA, UNSPECIFIED HYPERLIPIDEMIA TYPE: ICD-10-CM

## 2024-12-16 DIAGNOSIS — D64.9 ANEMIA, UNSPECIFIED TYPE: ICD-10-CM

## 2024-12-16 DIAGNOSIS — I10 PRIMARY HYPERTENSION: ICD-10-CM

## 2024-12-16 DIAGNOSIS — I71.21 ANEURYSM OF ASCENDING AORTA WITHOUT RUPTURE (CMS-HCC): ICD-10-CM

## 2024-12-16 DIAGNOSIS — Z86.79 S/P AORTIC ANEURYSM REPAIR: ICD-10-CM

## 2024-12-16 DIAGNOSIS — I71.21 ASCENDING AORTIC ANEURYSM, UNSPECIFIED WHETHER RUPTURED (CMS-HCC): ICD-10-CM

## 2024-12-16 DIAGNOSIS — Z98.890 S/P AORTIC ANEURYSM REPAIR: ICD-10-CM

## 2024-12-16 DIAGNOSIS — I35.1 AORTIC VALVE INSUFFICIENCY, ETIOLOGY OF CARDIAC VALVE DISEASE UNSPECIFIED: ICD-10-CM

## 2024-12-16 DIAGNOSIS — Z95.2 S/P AVR (AORTIC VALVE REPLACEMENT) AND AORTOPLASTY: ICD-10-CM

## 2024-12-16 DIAGNOSIS — Z95.2 S/P AVR: Primary | ICD-10-CM

## 2024-12-16 PROCEDURE — 99214 OFFICE O/P EST MOD 30 MIN: CPT | Performed by: NURSE PRACTITIONER

## 2024-12-16 PROCEDURE — 1157F ADVNC CARE PLAN IN RCRD: CPT | Performed by: NURSE PRACTITIONER

## 2024-12-16 PROCEDURE — 3075F SYST BP GE 130 - 139MM HG: CPT | Performed by: NURSE PRACTITIONER

## 2024-12-16 PROCEDURE — 93005 ELECTROCARDIOGRAM TRACING: CPT | Performed by: NURSE PRACTITIONER

## 2024-12-16 PROCEDURE — 1159F MED LIST DOCD IN RCRD: CPT | Performed by: NURSE PRACTITIONER

## 2024-12-16 PROCEDURE — 1036F TOBACCO NON-USER: CPT | Performed by: NURSE PRACTITIONER

## 2024-12-16 PROCEDURE — G2211 COMPLEX E/M VISIT ADD ON: HCPCS | Performed by: NURSE PRACTITIONER

## 2024-12-16 PROCEDURE — 1160F RVW MEDS BY RX/DR IN RCRD: CPT | Performed by: NURSE PRACTITIONER

## 2024-12-16 PROCEDURE — 3079F DIAST BP 80-89 MM HG: CPT | Performed by: NURSE PRACTITIONER

## 2024-12-16 NOTE — PROGRESS NOTES
Primary Care Physician: Eloise Naylor DO  Date of Visit: 12/16/2024  9:00 AM EST  Location of visit: [facility]     Chief Complaint:   Chief Complaint   Patient presents with    Follow-up        HPI / Summary:   Kiley Ward is a 69 y.o. female presents for followup. Seen in collaboration with Dr. Muniz. She was hospitalized in November at which time she underwent composite replacement of aortic root and ascending aorta with 2 23 mm Availnex aortic valve and 28 mm Gelweave graft, reimplantation of coronary arteries, and posterior pericardiotomy. Hospitalization was uncomplicated. She is overall feeling well. She has been able to ambulate up a flight of stairs at home without chest pain or dyspnea. Denies chest pain, orthopnea, pnd, lightheadedness, dizziness, syncope, palpitations, lower extremity edema, or bleeding issues.     She has been monitoring her blood pressure at home. Reports blood pressure has been 118 to 120 over 80s.           Past Medical History:  Past Medical History:   Diagnosis Date    ARMD (age related macular degeneration)     Ascending aorta dilation (CMS-HCC)     Crocker's esophagus     Breast cancer (Multi) 2010    s/p lumpectomy and sentinel lymph node biopsy chemotherapy and radiation.    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    Mass of breast 12/08/2024    Murmur     Neck pain 12/08/2024    Nipple discharge in female 12/08/2024    Pain of right breast 12/08/2024    Personal history of irradiation     Primary malignant neoplasm of right female breast 12/08/2024    PVD (posterior vitreous  detachment), both eyes     Shoulder pain 12/08/2024    Vitamin D deficiency         Past Surgical History:  Past Surgical History:   Procedure Laterality Date    BREAST LUMPECTOMY Bilateral     CARDIAC CATHETERIZATION N/A 09/27/2024    Procedure: Left Heart Cath with Coronary Angiography and LV;  Surgeon: Raul Muniz MD;  Location: [facility] Cardiac Cath Lab;  Service: Cardiovascular;  Laterality: N/A;    COLONOSCOPY      ESOPHAGOGASTRODUODENOSCOPY  08/2023          Social History:   reports that she has never smoked. She has never used smokeless tobacco. She reports current alcohol use of about 4.0 standard drinks of alcohol per week. She reports that she does not use drugs.     Family History:  family history includes Breast cancer in her mother and sister; Prostate cancer in her father.      Allergies:  No Known Allergies    Outpatient Medications:  Current Outpatient Medications   Medication Instructions    acetaminophen (TYLENOL) 650 mg, oral, Every 6 hours PRN    aspirin 81 mg, oral, Daily    atorvastatin (LIPITOR) 10 mg, oral, Nightly    calcitriol (Rocaltrol) 0.25 mcg capsule 1 capsule, Daily    carvedilol (COREG) 6.25 mg, oral, 2 times daily (morning and late afternoon)    dexlansoprazole (DEXILANT) 60 mg, oral, Daily    docusate sodium (COLACE) 100 mg, oral, 2 times daily PRN    ergocalciferol (Vitamin D-2) 1.25 MG (25367 UT) capsule 1 capsule (50,000 Units) every 14 (fourteen) days.    famotidine (PEPCID) 40 mg, Daily    multivitamin with minerals tablet 1 tablet, oral, Daily    mv-min/FA/vit K/lutein/zeaxant (PRESERVISION AREDS 2 PLUS MV ORAL) 1 capsule, Daily    polyethylene glycol (GLYCOLAX, MIRALAX) 17 g, oral, Daily PRN       Physical Exam:  Vitals:    12/16/24 0852   BP: (!) 150/94   BP Location: Left arm   Patient Position: Sitting   Pulse: 85   SpO2: 97%   Weight: 63.5 kg (140 lb)     Wt Readings from Last 5 Encounters:   12/16/24 63.5 kg (140 lb)   11/20/24 64.1 kg (141 lb 6.4 oz)   11/11/24 66.7  kg (147 lb)   11/10/24 68.5 kg (151 lb)   10/17/24 69.9 kg (154 lb 1.6 oz)     Body mass index is 24.03 kg/m².   GENERAL: alert, cooperative, pleasant, in no acute distress  SKIN: warm and dry  NECK: Normal JVD, negative HJR  CARDIAC: Regular rate and rhythm with 2/6 early peaking systolic murmur, no rubs or gallops  CHEST: Normal respiratory efforts, lungs clear to auscultation bilaterally.  ABDOMEN: soft, nontender, nondistended  EXTREMITIES: no edema, +2 palpable RP and PT pulses bilaterally       Last Labs:  Recent Labs     12/06/24  0912 11/25/24  1012 11/19/24  0857   WBC 4.6 6.3 9.0   HGB 9.7* 9.2* 9.2*   HCT 30.5* 29.8* 29.1*    608* 777*   MCV 94 95 97     Recent Labs     11/25/24  1012 11/19/24  0857 11/10/24  0535    135* 139   K 4.3 4.0 3.6    100 101   BUN 16 12 15   CREATININE 0.91 0.80 0.72     CMP -  Lab Results   Component Value Date    CALCIUM 9.1 11/25/2024    PHOS 4.1 11/25/2024    PROT 7.7 11/25/2024    ALBUMIN 3.6 11/25/2024    AST 22 11/25/2024    ALT 42 11/25/2024    ALKPHOS 491 (H) 11/25/2024    BILITOT 0.4 11/25/2024       LIPID PANEL -   Lab Results   Component Value Date    CHOL 177 05/23/2024    HDL 59.4 05/23/2024    LDLF 112 (H) 10/18/2022    TRIG 74 05/23/2024    5/23/24    Lab Results   Component Value Date    HGBA1C 5.6 10/18/2022       Last Cardiology Tests:  ECG:  Obtained and reviewed EKG- normal sinus rhythm HR 75, Left atrial enlargement, LAD, LVH, non specific T wave abnormality HR 75    Echo:  Echo Results:  Transthoracic Echo (TTE) Complete With Contrast And Ultromics 11/08/2024    [facility], [address] [place] [address]  Tel [phone] and Fax [phone]    TRANSTHORACIC ECHOCARDIOGRAM REPORT      Patient Name:       DUTCH Meyers Physician:    [id number] Andrés Armstrong MD  Study Date:         11/8/2024           Ordering Provider:    [id number] JULIANO ROWLAND  MRN/PID:            [id number]             Fellow:  Accession#:         [id number]        Nurse:  Date of Birth/Age:  1955 / 69      Sonographer:          Tahira walton RDCS  Gender assigned at  F                   Additional Staff:  Birth:  Height:             162.56 cm           Admit Date:           11/5/2024  Weight:             70.76 kg            Admission Status:     Inpatient -  Routine  BSA / BMI:          1.76 m2 / 26.78     Encounter#:           [id number]  kg/m2  Blood Pressure:     105/69 mmHg         Department Location:  [facility]  Non Invasive    Study Type:    TRANSTHORACIC ECHO (TTE) COMPLETE  Diagnosis/ICD: Presence of prosthetic heart valve-Z95.2  Indication:    S/p AVR  CPT Code:      Echo Complete w Full Doppler-[id number]    Patient History:  Pertinent History: HTN, Hyperlipidemia and Previous DVT. s/p Bentall's  procedure, composite replacement of aortic root and ascending  aorta with 2 23 mm Availnex aortic valve and 28 mm Gelweave  graft 11/5/24; Hx of breast canser, s/p bilateral lumpectomy,  chemotherapy.    Study Detail: The following Echo studies were performed: 2D, M-Mode, Doppler and  color flow. Technically challenging study due to body habitus and  postoperative dressings. Definity used as a contrast agent for  endocardial border definition. Total contrast used for this  procedure was 1 mL via IV push.      PHYSICIAN INTERPRETATION:  Left Ventricle: Left ventricular ejection fraction is normal, by visual estimate at 55-60%. There are no regional left ventricular wall motion abnormalities. The left ventricular cavity size is normal. There is mildly increased septal and mildly increased posterior left ventricular wall thickness. There is left ventricular concentric remodeling. Abnormal (paradoxical) septal motion consistent with post-operative status and the interventricular septum is flattened in diastole ('D' shaped left ventricle), consistent with right ventricular  volume overload. Spectral Doppler shows a Grade II (pseudonormal pattern) of left ventricular diastolic filling with an elevated left atrial pressure.  Left Atrium: The left atrium is mildly dilated.  Right Ventricle: The right ventricle is mildly enlarged. There is mildly reduced right ventricular systolic function.  Right Atrium: The right atrium is mild to moderately dilated.  Aortic Valve: There is a prosthetic aortic valve present. The aortic valve dimensionless index is 0.75. There is no evidence of aortic valve regurgitation. The peak instantaneous gradient of the aortic valve is 29 mmHg. The mean gradient of the aortic valve is 16 mmHg. S/p Bental procedure.  Mitral Valve: The mitral valve is normal in structure. There is trace to mild mitral valve regurgitation.  Tricuspid Valve: The tricuspid valve is structurally normal. There is moderate tricuspid regurgitation. The Doppler estimated RVSP is within normal limits at 27.6 mmHg.  Pulmonic Valve: The pulmonic valve is structurally normal. There is physiologic pulmonic valve regurgitation.  Pericardium: Trivial pericardial effusion.  Pleural: There is a moderate left pleural effusion.  Aorta: The aortic root is abnormal. There is a prosthetic graft seen in the position of the ascending aorta. S/p Bentalls procedure.  In comparison to the previous echocardiogram(s): Compared with study dated 11/5/2024, expected post-op changes except the TR.      CONCLUSIONS:  1. Left ventricular ejection fraction is normal, by visual estimate at 55-60%.  2. Spectral Doppler shows a Grade II (pseudonormal pattern) of left ventricular diastolic filling with an elevated left atrial pressure.  3. Abnormal septal motion consistent with post-operative status and right ventricular volume overload.  4. There is mildly reduced right ventricular systolic function.  5. Mildly enlarged right ventricle.  6. The left atrium is mildly dilated.  7. The right atrium is mild to moderately  dilated.  8. There is a moderate pleural effusion.  9. Moderate tricuspid regurgitation visualized.  10. Right ventricular systolic pressure is within normal limits.  11. Prosthetic graft in the ascending aorta position.  12. Compared with study dated 11/5/2024, expected post-op changes except the TR.    RECOMMENDATIONS:  Utilizing an FDA cleared automated machine learning algorithm (EchoGo Heart Failure by [employer]), the analysis of the apical 4-chamber echocardiogram suggests the presence of heart failure with preserved ejection fraction (HFpEF)*. Clinical correlation looking for additional heart failure signs and symptoms is recommended, as a definite diagnosis of heart failure cannot be made by imaging alone.  *Per ACC/AHA/HFSA universal diagnosis of heart failure, HFpEF is defined as 1) signs and symptoms leading to clinical diagnosis of heart failure, 2) an ejection fraction of at least 50%, and 3) evidence of elevated intra-cardiac filling pressures by echocardiography, BNP elevation, or catheterization.    QUANTITATIVE DATA SUMMARY:    2D MEASUREMENTS:          Normal Ranges:  IVSd:            1.20 cm  (0.6-1.1cm)  LVPWd:           1.20 cm  (0.6-1.1cm)  LVIDd:           3.70 cm  (3.9-5.9cm)  LVIDs:           2.60 cm  LV Mass Index:   84 g/m2  LVEDV Index:     60 ml/m2  LV % FS          29.7 %      LA VOLUME:                   Normal Ranges:  LA Vol A4C:        64.1 ml   (22+/-6mL/m2)  LA Vol Index A4C:  36.4ml/m2  LA Area A4C:       20.5 cm2  LA Major Axis A4C: 5.6 cm      RA VOLUME BY A/L METHOD:          Normal Ranges:  RA Area A4C:             22.4 cm2      AORTA MEASUREMENTS:         Normal Ranges:  Asc Ao, d:          3.31 cm (2.1-3.4cm)      LV SYSTOLIC FUNCTION BY 2D PLANIMETRY (MOD):  Normal Ranges:  EF-A4C View:    51 % (>=55%)  EF-A2C View:    49 %  EF-Biplane:     50 %  EF-Visual:      58 %  LV EF Reported: 58 %      LV DIASTOLIC FUNCTION:             Normal Ranges:  MV Peak E:             1.07  m/s    (0.7-1.2 m/s)  MV Peak A:             0.86 m/s    (0.42-0.7 m/s)  E/A Ratio:             1.24        (1.0-2.2)  MV e'                  0.056 m/s   (>8.0)  MV lateral e'          0.05 m/s  MV medial e'           0.06 m/s  MV A Dur:              140.00 msec  E/e' Ratio:            19.09       (<8.0)      MITRAL VALVE:          Normal Ranges:  MV DT:        235 msec (150-240msec)      AORTIC VALVE:                      Normal Ranges:  AoV Vmax:                2.69 m/s  (<=1.7m/s)  AoV Peak P.9 mmHg (<20mmHg)  AoV Mean P.0 mmHg (1.7-11.5mmHg)  LVOT Max Sathish:            2.14 m/s  (<=1.1m/s)  AoV VTI:                 59.10 cm  (18-25cm)  LVOT VTI:                44.60 cm  LVOT Diameter:           2.20 cm   (1.8-2.4cm)  AoV Area, VTI:           2.87 cm2  (2.5-5.5cm2)  AoV Area,Vmax:           3.02 cm2  (2.5-4.5cm2)  AoV Dimensionless Index: 0.75      RIGHT VENTRICLE:  RV Basal 4.40 cm  RV Mid   3.30 cm  RV Major 6.7 cm  TAPSE:   15.1 mm  RV s'    0.09 m/s      TRICUSPID VALVE/RVSP:          Normal Ranges:  Peak TR Velocity:     2.48 m/s  RV Syst Pressure:     28 mmHg  (< 30mmHg)      PULMONIC VALVE:          Normal Ranges:  PV Accel Time:  127 msec (>120ms)  PV Max Sathish:     1.3 m/s  (0.6-0.9m/s)  PV Max P.0 mmHg      [id number] Andrés Armstrong MD  Electronically signed on 2024 at 1:56:20 PM        ** Final **      Transthoracic Echo (TTE) Complete 2024    [facility], [address], [place] [address]  Tel [phone] and Fax [phone]    TRANSTHORACIC ECHOCARDIOGRAM REPORT      Patient Name:      DUTCH FITZGERALD       Mira Physician:    [id number] Fortino Marcelino MD  Study Date:        2024            Ordering Provider:    [id number] RAHEL BENJAMIN  MRN/PID:           [id number]             Fellow:  Accession#:        [id number]         Nurse:  Date of Birth/Age: 1955 / 69 years Sonographer:          [place] AYALA  Gender:             F                    Additional Staff:  Height:            162.56 cm            Admit Date:  Weight:            68.04 kg             Admission Status:     Outpatient  BSA / BMI:         1.73 m2 / 25.75      Encounter#:           [id number]  kg/m2  Department Location:  [facility]  Blood Pressure: 167 /95 mmHg    Study Type:    TRANSTHORACIC ECHO (TTE) COMPLETE  Diagnosis/ICD: Ascending aorta dilatation-I77.810; Nonrheumatic aortic (valve)  insufficiency-I35.1  Indication:    Ascending Ao dilation, AI  CPT Code:      Echo Complete w Full Doppler-[id number]    Patient History:  Pertinent History: Ascending Ao dilation, AI, GERD, Hyperthyroidism,  Diverticular disease, short- segment Crocker's esophagus, H/o  Breast CA, h/o CHEMO and Radiation (2011, 2015), murmur.    Study Detail: The following Echo studies were performed: 2D, M-Mode, Doppler and  color flow.      PHYSICIAN INTERPRETATION:  Left Ventricle: The left ventricular systolic function is low normal, with an estimated ejection fraction of 50-55%. There are no regional wall motion abnormalities. The left ventricular cavity size is normal. Spectral Doppler shows an impaired relaxation pattern of left ventricular diastolic filling.  Left Atrium: The left atrium is mild to moderately dilated.  Right Ventricle: The right ventricle is normal in size. There is normal right ventricular global systolic function.  Right Atrium: The right atrium is normal in size.  Aortic Valve: The aortic valve appears structurally normal. There is evidence of mild aortic valve stenosis.  There is mild aortic valve regurgitation. The peak instantaneous gradient of the aortic valve is 22.0 mmHg. The mean gradient of the aortic valve is 10.1 mmHg.  Mitral Valve: The mitral valve is normal in structure. There is trace to mild mitral valve regurgitation.  Tricuspid Valve: The tricuspid valve is structurally normal. There is mild tricuspid regurgitation. The Doppler estimated  RVSP is mildly elevated at 33.6 mmHg.  Pulmonic Valve: The pulmonic valve is structurally normal. There is trace pulmonic valve regurgitation.  Pericardium: There is a trivial pericardial effusion.  Aorta: The aortic root is abnormal. There is moderate dilatation of the ascending aorta. There is mild dilatation of the aortic root.  Systemic Veins: The inferior vena cava appears to be of normal size. There is IVC inspiratory collapse greater than 50%.  In comparison to the previous echocardiogram(s): There are no prior studies on this patient for comparison purposes.      CONCLUSIONS:  1. Left ventricular systolic function is low normal with a 50-55% estimated ejection fraction.  2. Spectral Doppler shows an impaired relaxation pattern of left ventricular diastolic filling.  3. The left atrium is mild to moderately dilated.  4. Mildly elevated RVSP.  5. Mild aortic valve stenosis.  6. Mild aortic valve regurgitation.  7. There is moderate dilatation of the ascending aorta.    QUANTITATIVE DATA SUMMARY:  2D MEASUREMENTS:  Normal Ranges:  Ao Root d:     3.80 cm   (2.0-3.7cm)  LAs:           3.79 cm   (2.7-4.0cm)  IVSd:          0.90 cm   (0.6-1.1cm)  LVPWd:         0.77 cm   (0.6-1.1cm)  LVIDd:         4.24 cm   (3.9-5.9cm)  LVIDs:         3.51 cm  LV Mass Index: 63.1 g/m2  LV % FS        17.3 %    LA VOLUME:  Normal Ranges:  LA Vol A4C:        81.6 ml    (22+/-6mL/m2)  LA Vol A2C:        133.9 ml  LA Vol BP:         108.8 ml  LA Vol Index A4C:  47.1 ml/m2  LA Vol Index A2C:  77.4 ml/m2  LA Vol Index BP:   62.9 ml/m2  LA Area A4C:       24.0 cm2  LA Area A2C:       32.0 cm2  LA Major Axis A4C: 6.0 cm  LA Major Axis A2C: 6.5 cm  LA Volume Index:   62.0 ml/m2  LA Vol A4C:        78.7 ml  LA Vol A2C:        123.3 ml    RA VOLUME BY A/L METHOD:  Normal Ranges:  RA Vol A4C:        60.7 ml    (8.3-19.5ml)  RA Vol Index A4C:  35.1 ml/m2  RA Area A4C:       20.0 cm2  RA Major Axis A4C: 5.6 cm    M-MODE MEASUREMENTS:  Normal  Ranges:  Ao Root: 4.20 cm (2.0-3.7cm)  LAs:     3.32 cm (2.7-4.0cm)    AORTA MEASUREMENTS:  Normal Ranges:  Asc Ao, d: 4.50 cm (2.1-3.4cm)  Ao Arch:   3.40 cm (2.0-3.6cm)    LV SYSTOLIC FUNCTION BY 2D PLANIMETRY (MOD):  Normal Ranges:  EF-A4C View: 59.1 % (>=55%)  EF-A2C View: 54.3 %  EF-Biplane:  56.2 %    LV DIASTOLIC FUNCTION:  Normal Ranges:  MV Peak E:        0.52 m/s    (0.7-1.2 m/s)  MV Peak A:        1.09 m/s    (0.42-0.7 m/s)  E/A Ratio:        0.47        (1.0-2.2)  MV e'             0.06 m/s    (>8.0)  MV lateral e'     0.11 m/s  MV medial e'      0.09 m/s  MV A Dur:         125.59 msec  E/e' Ratio:       7.98        (<8.0)  a'                0.14 m/s  PulmV Sys Sathish:    55.13 cm/s  PulmV Carmona Sathish:   35.20 cm/s  PulmV S/D Sathish:    1.57  PulmV A Revs Sathish: 26.43 cm/s  PulmV A Revs Dur: 104.66 msec    MITRAL VALVE:  Normal Ranges:  MV DT: 148 msec (150-240msec)    MITRAL INSUFFICIENCY:  Normal Ranges:  MR VTI:  252.95 cm  MR Vmax: 641.27 cm/s    AORTIC VALVE:  Normal Ranges:  AoV Vmax:                2.34 m/s  (<=1.7m/s)  AoV Peak P.0 mmHg (<20mmHg)  AoV Mean PG:             10.1 mmHg (1.7-11.5mmHg)  LVOT Max Sathish:            1.53 m/s  (<=1.1m/s)  AoV VTI:                 50.03 cm  (18-25cm)  LVOT VTI:                31.38 cm  LVOT Diameter:           2.05 cm   (1.8-2.4cm)  AoV Area, VTI:           2.08 cm2  (2.5-5.5cm2)  AoV Area,Vmax:           2.16 cm2  (2.5-4.5cm2)  AoV Dimensionless Index: 0.63    AORTIC INSUFFICIENCY:  AI Vmax:       5.21 m/s  AI Half-time:  383 msec  AI Decel Time: 1322 msec  AI Decel Rate: 396.39 cm/s2      RIGHT VENTRICLE:  RV Basal 3.90 cm  RV Mid   2.50 cm  RV Major 8.5 cm  TAPSE:   23.7 mm  RV s'    0.18 m/s    TRICUSPID VALVE/RVSP:  Normal Ranges:  Peak TR Velocity: 2.77 m/s  RV Syst Pressure: 33.6 mmHg (< 30mmHg)  IVC Diam:         1.30 cm    PULMONIC VALVE:  Normal Ranges:  PV Accel Time: 140 msec (>120ms)  PV Max Sathish:    1.0 m/s  (0.6-0.9m/s)  PV Max P.0  mmHg    Pulmonary Veins:  PulmV A Revs Dur: 104.66 msec  PulmV A Revs Sathish: 26.43 cm/s  PulmV Carmona Sathish:   35.20 cm/s  PulmV S/D Sathish:    1.57  PulmV Sys Sathish:    55.13 cm/s      [id number] Fortino Marcelino MD  Electronically signed on 2024 at 10:25:09 PM        ** Final **         Cath:  24  CONCLUSIONS:   1. Minimal luminal irregularities in a right dominant system.    Stress Test:  Stress Results:  No results found for this or any previous visit from the past 365 days.         Cardiac Imagin24  IMPRESSION:  1.  Small bilateral pleural effusions which appear greater on the  left than right.  2. Probable subsegmental atelectasis left lower lobe behind the heart.  3. Moderate cardiomegaly, underlying pericardial effusion not  excluded.    Assessment/Plan   Problem List Items Addressed This Visit          Cardiac and Vasculature    Aneurysm of ascending aorta without rupture (CMS-HCC)    HTN (hypertension)    Hyperlipidemia    Relevant Orders    Lipid panel     Other Visit Diagnoses       S/P AVR    -  Primary    Relevant Orders    ECG 12 lead (Clinic Performed)    Stress Test    CBC    Comprehensive metabolic panel    Aortic valve insufficiency, etiology of cardiac valve disease unspecified              In summary Ms. Ward is a pleasant 69-year-old white female with a past medical history significant for ascending aortic aneurysm with a trileaflet aortic valve s/p aorta and aortic replacement and aortic valve replacement, hyperlipidemia with a CT calcium score of 0 in , prior left upper extremity DVT, and bilateral breast cancer status post prior Herceptin. She was hospitalized in November at which time she underwent composite replacement of aortic root and ascending aorta with 2 23 mm Availnex aortic valve and 28 mm Gelweave graft, reimplantation of coronary arteries, and posterior pericardiotomy. Hospitalization was uncomplicated. Her blood pressure is marginal. Home blood pressures are better. I did  not adjust medications. She is euvolemic by clinical exam. I have ordered blood work as above to be done in January for surveillance of anemia, alk phos (as this was elevated), and lipid panel. She will follow up with cardiac surgery as scheduled. I did educate her that she benefits for antibiotics prior to any dental cleanings or procedures. She will follow up with her primary care physician about iron supplement as she has had difficulties tolerating. She will continue current cardiovascular medications. We will see her back in follow-up in 3 months.       Orders:  No orders of the defined types were placed in this encounter.     Followup Appts:  Future Appointments   Date Time Provider Department Center   12/20/2024  9:00 AM [facility] [id number] CT [id number] [facility]   1/7/2025  8:00 AM TWINS ECHO/STRESS [id number] [facility]   1/10/2025  8:00 AM Efrain Ramirez MD [id number] [facility]   2/20/2025 10:30 AM Eloise WEI Pletikojasbir, DO [id number] [facility]   2/24/2025  8:30 AM Indra Torres MD [id number] None   4/3/2025  8:45 AM Leon Ramirez MD [id number] [facility]   8/19/2025  [facility]   8/19/2025 10:30 AM Nicky Mayers APRN-CNP [id number] [place]           ____________________________________________________________  Brenda Rodrigues APRN-CNP  [facility] [place]  [facility]

## 2024-12-16 NOTE — PATIENT INSTRUCTIONS
Recommend against use of things with pseudoephedrine or ephedrine for example Afrin or Mucinex-d   Continue current meds  Check blood work in January CBC, CMP, and lipid panel  Follow up in 3 months  Cardiac rehab  You must have antibiotic amoxicillin prior to dental cleanings and procedures  Follow up with primary care physician regarding iron supplement

## 2024-12-17 LAB
ATRIAL RATE: 75 BPM
P AXIS: 39 DEGREES
P OFFSET: 194 MS
P ONSET: 134 MS
PR INTERVAL: 160 MS
Q ONSET: 214 MS
QRS COUNT: 13 BEATS
QRS DURATION: 78 MS
QT INTERVAL: 416 MS
QTC CALCULATION(BAZETT): 464 MS
QTC FREDERICIA: 447 MS
R AXIS: -35 DEGREES
T AXIS: 91 DEGREES
T OFFSET: 422 MS
VENTRICULAR RATE: 75 BPM

## 2024-12-20 ENCOUNTER — HOSPITAL ENCOUNTER (OUTPATIENT)
Dept: RADIOLOGY | Facility: CLINIC | Age: 69
End: 2024-12-20
Payer: MEDICARE

## 2025-01-03 ENCOUNTER — HOSPITAL ENCOUNTER (OUTPATIENT)
Dept: CARDIOLOGY | Facility: HOSPITAL | Age: 70
Discharge: HOME | End: 2025-01-03
Payer: MEDICARE

## 2025-01-03 DIAGNOSIS — Z95.2 S/P AVR: ICD-10-CM

## 2025-01-03 PROCEDURE — 93017 CV STRESS TEST TRACING ONLY: CPT

## 2025-01-03 PROCEDURE — 93018 CV STRESS TEST I&R ONLY: CPT | Performed by: INTERNAL MEDICINE

## 2025-01-03 PROCEDURE — 93016 CV STRESS TEST SUPVJ ONLY: CPT | Performed by: INTERNAL MEDICINE

## 2025-01-06 NOTE — PROGRESS NOTES
Kiley returns status post 11/5/24 avr ascending aorta surgery. Tootie Roca RN    Patient comes to clinic 9 weeks status post median sternotomy, and composite replacement of her aortic valve, aortic root, and ascending aorta with bovine pericardial valve and dacryon graft.  From a cardiac point of view patient is feeling well.  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  On physical exam her incision is well-healed and her sternum is stable.  Her chest x-ray demonstrates bilateral clear lung fields and intact sternal wires and her CT scan demonstrates intact aortic graft.  Echocardiography demonstrates well-functioning bioprosthetic aortic valve.  Patient has recovered nicely from her open heart surgery.  At this time she may resume her normal activities including driving, exercising, and and may begin with cardiac rehab.  Patient instructed to return to clinic as needed.

## 2025-01-07 ENCOUNTER — HOSPITAL ENCOUNTER (OUTPATIENT)
Dept: CARDIOLOGY | Facility: CLINIC | Age: 70
Discharge: HOME | End: 2025-01-07
Payer: MEDICARE

## 2025-01-07 ENCOUNTER — TELEPHONE (OUTPATIENT)
Facility: CLINIC | Age: 70
End: 2025-01-07
Payer: MEDICARE

## 2025-01-07 DIAGNOSIS — Z95.2 STATUS POST AORTIC VALVE REPLACEMENT: ICD-10-CM

## 2025-01-07 DIAGNOSIS — Z98.890 S/P ASCENDING AORTIC ANEURYSM REPAIR: ICD-10-CM

## 2025-01-07 DIAGNOSIS — I71.21 ANEURYSM OF ASCENDING AORTA WITHOUT RUPTURE (CMS-HCC): ICD-10-CM

## 2025-01-07 DIAGNOSIS — G51.9 FACIAL NERVE DISEASE: ICD-10-CM

## 2025-01-07 DIAGNOSIS — C43.31 MELANOMA OF NOSE (MULTI): ICD-10-CM

## 2025-01-07 DIAGNOSIS — I31.39 PERICARDIAL EFFUSION (HHS-HCC): ICD-10-CM

## 2025-01-07 DIAGNOSIS — Z95.2 S/P AVR: Primary | ICD-10-CM

## 2025-01-07 DIAGNOSIS — Z86.79 S/P ASCENDING AORTIC ANEURYSM REPAIR: ICD-10-CM

## 2025-01-07 PROCEDURE — 93306 TTE W/DOPPLER COMPLETE: CPT

## 2025-01-07 PROCEDURE — 93306 TTE W/DOPPLER COMPLETE: CPT | Performed by: INTERNAL MEDICINE

## 2025-01-07 NOTE — TELEPHONE ENCOUNTER
LVMTCB- Calling patient to go over tentative surgery plan once patient has MOHS with dermatology. Dr. Shipman is planning a forehead flap, nasal reconstruction, cartilage/skin graft surgery for possible 1/28/25. Patient has an extensive cardiac history and would require cardiac clearance and direction from cardiology on medications, and antibiotic orders. PAT orders have been placed. Awaiting call back from patient to go over plan and set her up with either a virtual visit or an in person clinic visit with Dr. Shipman.

## 2025-01-08 ENCOUNTER — HOSPITAL ENCOUNTER (OUTPATIENT)
Dept: RADIOLOGY | Facility: HOSPITAL | Age: 70
Discharge: HOME | End: 2025-01-08
Payer: MEDICARE

## 2025-01-08 ENCOUNTER — LAB (OUTPATIENT)
Dept: LAB | Facility: LAB | Age: 70
End: 2025-01-08
Payer: MEDICARE

## 2025-01-08 DIAGNOSIS — Z95.2 S/P AVR: ICD-10-CM

## 2025-01-08 DIAGNOSIS — E78.5 HYPERLIPIDEMIA, UNSPECIFIED HYPERLIPIDEMIA TYPE: ICD-10-CM

## 2025-01-08 DIAGNOSIS — Z98.890 STATUS POST AORTA REPAIR: ICD-10-CM

## 2025-01-08 DIAGNOSIS — D64.9 ANEMIA, UNSPECIFIED TYPE: ICD-10-CM

## 2025-01-08 LAB
ALBUMIN SERPL BCP-MCNC: 4 G/DL (ref 3.4–5)
ALP SERPL-CCNC: 103 U/L (ref 33–136)
ALT SERPL W P-5'-P-CCNC: 24 U/L (ref 7–45)
ANION GAP SERPL CALC-SCNC: 9 MMOL/L (ref 10–20)
AORTIC VALVE MEAN GRADIENT: 12 MMHG
AORTIC VALVE PEAK VELOCITY: 2.32 M/S
AST SERPL W P-5'-P-CCNC: 24 U/L (ref 9–39)
AV PEAK GRADIENT: 22 MMHG
AVA (PEAK VEL): 1.37 CM2
AVA (VTI): 1.12 CM2
BASOPHILS # BLD AUTO: 0.04 X10*3/UL (ref 0–0.1)
BASOPHILS NFR BLD AUTO: 1 %
BILIRUB SERPL-MCNC: 0.5 MG/DL (ref 0–1.2)
BUN SERPL-MCNC: 15 MG/DL (ref 6–23)
CALCIUM SERPL-MCNC: 9.1 MG/DL (ref 8.6–10.3)
CHLORIDE SERPL-SCNC: 105 MMOL/L (ref 98–107)
CHOLEST SERPL-MCNC: 154 MG/DL (ref 0–199)
CHOLESTEROL/HDL RATIO: 2.6
CO2 SERPL-SCNC: 29 MMOL/L (ref 21–32)
CREAT SERPL-MCNC: 0.75 MG/DL (ref 0.6–1.3)
CREAT SERPL-MCNC: 0.8 MG/DL (ref 0.5–1.05)
EGFRCR SERPLBLD CKD-EPI 2021: 81 ML/MIN/1.73M*2
EJECTION FRACTION APICAL 4 CHAMBER: 51.8
EJECTION FRACTION: 53 %
EOSINOPHIL # BLD AUTO: 0.11 X10*3/UL (ref 0–0.7)
EOSINOPHIL NFR BLD AUTO: 2.7 %
ERYTHROCYTE [DISTWIDTH] IN BLOOD BY AUTOMATED COUNT: 14.3 % (ref 11.5–14.5)
GFR SERPL CREATININE-BSD FRML MDRD: 87 ML/MIN/1.73M*2
GLUCOSE SERPL-MCNC: 99 MG/DL (ref 74–99)
HCT VFR BLD AUTO: 34.7 % (ref 36–46)
HDLC SERPL-MCNC: 59.5 MG/DL
HGB BLD-MCNC: 11.1 G/DL (ref 12–16)
IMM GRANULOCYTES # BLD AUTO: 0.01 X10*3/UL (ref 0–0.7)
IMM GRANULOCYTES NFR BLD AUTO: 0.2 % (ref 0–0.9)
LDLC SERPL CALC-MCNC: 81 MG/DL
LEFT ATRIUM VOLUME AREA LENGTH INDEX BSA: 41.9 ML/M2
LEFT VENTRICLE INTERNAL DIMENSION DIASTOLE: 4.29 CM (ref 3.5–6)
LEFT VENTRICULAR OUTFLOW TRACT DIAMETER: 1.89 CM
LYMPHOCYTES # BLD AUTO: 0.96 X10*3/UL (ref 1.2–4.8)
LYMPHOCYTES NFR BLD AUTO: 23.5 %
MCH RBC QN AUTO: 28.7 PG (ref 26–34)
MCHC RBC AUTO-ENTMCNC: 32 G/DL (ref 32–36)
MCV RBC AUTO: 90 FL (ref 80–100)
MITRAL VALVE E/A RATIO: 0.66
MONOCYTES # BLD AUTO: 0.48 X10*3/UL (ref 0.1–1)
MONOCYTES NFR BLD AUTO: 11.8 %
NEUTROPHILS # BLD AUTO: 2.48 X10*3/UL (ref 1.2–7.7)
NEUTROPHILS NFR BLD AUTO: 60.8 %
NON HDL CHOLESTEROL: 95 MG/DL (ref 0–149)
NRBC BLD-RTO: 0 /100 WBCS (ref 0–0)
PLATELET # BLD AUTO: 327 X10*3/UL (ref 150–450)
POTASSIUM SERPL-SCNC: 4.3 MMOL/L (ref 3.5–5.3)
PROT SERPL-MCNC: 7.3 G/DL (ref 6.4–8.2)
RBC # BLD AUTO: 3.87 X10*6/UL (ref 4–5.2)
RIGHT VENTRICLE FREE WALL PEAK S': 8.18 CM/S
RIGHT VENTRICLE PEAK SYSTOLIC PRESSURE: 26.6 MMHG
SODIUM SERPL-SCNC: 139 MMOL/L (ref 136–145)
TRICUSPID ANNULAR PLANE SYSTOLIC EXCURSION: 1.6 CM
TRIGL SERPL-MCNC: 70 MG/DL (ref 0–149)
VLDL: 14 MG/DL (ref 0–40)
WBC # BLD AUTO: 4.1 X10*3/UL (ref 4.4–11.3)

## 2025-01-08 PROCEDURE — 71275 CT ANGIOGRAPHY CHEST: CPT | Performed by: STUDENT IN AN ORGANIZED HEALTH CARE EDUCATION/TRAINING PROGRAM

## 2025-01-08 PROCEDURE — 71046 X-RAY EXAM CHEST 2 VIEWS: CPT

## 2025-01-08 PROCEDURE — 2550000001 HC RX 255 CONTRASTS: Performed by: THORACIC SURGERY (CARDIOTHORACIC VASCULAR SURGERY)

## 2025-01-08 PROCEDURE — 71046 X-RAY EXAM CHEST 2 VIEWS: CPT | Performed by: RADIOLOGY

## 2025-01-08 PROCEDURE — 71275 CT ANGIOGRAPHY CHEST: CPT

## 2025-01-08 PROCEDURE — 82565 ASSAY OF CREATININE: CPT

## 2025-01-08 RX ADMIN — IOHEXOL 75 ML: 350 INJECTION, SOLUTION INTRAVENOUS at 08:56

## 2025-01-08 NOTE — RESULT ENCOUNTER NOTE
CBC shows improvement with hemoglobin/hematocrit 11.1 and 34.7 respectively with previous at 9.7 and 30.5    Sugar, kidneys, liver, electrolytes are now normalized    Cholesterol one of the best on file at 154, HDL 59, LDL 81, triglyceride 70

## 2025-01-09 ENCOUNTER — TELEMEDICINE (OUTPATIENT)
Facility: CLINIC | Age: 70
End: 2025-01-09
Payer: MEDICARE

## 2025-01-09 DIAGNOSIS — C43.31 MELANOMA OF NOSE (MULTI): Primary | ICD-10-CM

## 2025-01-09 PROCEDURE — 99205 OFFICE O/P NEW HI 60 MIN: CPT | Performed by: OTOLARYNGOLOGY

## 2025-01-09 PROCEDURE — 1157F ADVNC CARE PLAN IN RCRD: CPT | Performed by: OTOLARYNGOLOGY

## 2025-01-09 NOTE — PROGRESS NOTES
Facial Plastic & Reconstructive Surgery      Chief Complaint: Nasal skin cancer    Referring Provider: Dr. Laguna    69 y.o. female with PMHx of aortic aneurysm s/p OHS for AVR 11/5/24, DVTs, Short segment Crocker's esophagus, GRD, breast CA s/p chemorad in 2011, now with newly diagnosed Melanoma of nose, referred by Dr. 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    Location: nasal tip  Quality: melanoma in situ  Severity: moderate  Duration: months  Timing: all times  Context: as above  Modifying factors: none  Associated signs and symptoms: none    Past Medical History  She has a past medical history of ARMD (age related macular degeneration), Ascending aorta dilation (CMS-HCC), Crocker's esophagus, Breast cancer (Multi) (2010), Clotting disorder (Multi), DVT (deep venous thrombosis) (Multi) (2019), GERD (gastroesophageal reflux disease), History of deep venous thrombosis (12/08/2024), antineoplastic chemo, Hyperlipidemia, Hypertension, Hyperthyroidism, Leukopenia (12/08/2024), 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    Surgical History  She has a past surgical history that includes Breast lumpectomy (Bilateral); Cardiac catheterization (N/A, 09/27/2024); Esophagogastroduodenoscopy (08/2023); and Colonoscopy.     Social History  She reports that she has never smoked. She has never used smokeless tobacco. She reports current alcohol use of about 4.0 standard drinks of alcohol per week. She reports that she does not use drugs.    Family History  Family History   Problem Relation Name Age of Onset    Breast cancer Mother       Prostate cancer Father      Breast cancer Sister      Glaucoma Neg Hx          Allergies  Patient has no known allergies.    Past, family, and social history obtained but not pertinent to current problem unless documented above.    All other systems have been reviewed and are negative for complaint unless documented above.    Physical Exam:  General: Well-developed and well-nourished in appearance.    Nose: Dorsum is midline. Left nasal tip lesion    Assessment: MIS nasal tip/ala    Plan:     We discussed many options for reconstruction of the nose and nasal vestibule including possible skin graft, locoregional flaps, ansal valve reconstruction, septal flaps, auricular composite/cartilage grafts, regional flaps including paramedian forehead and cheek flap    A majority of the work and / or time of this visit was conducted by means of a two-way synchronous audio-video connection, and technical limitations of providing care through this modality were explained to the patient. Patient has explicitly consented to this telehealth visit. I was with direct face-to-face time with the patient during this visit, and more than 50% of the time was spent in counseling and coordination of care. In addition.    Total time of direct and indirect care for this visit: 60 min      Duke Shipman MD      , Facial Plastic & Reconstructive Surgery        P: [phone]  F: [phone]

## 2025-01-10 ENCOUNTER — OFFICE VISIT (OUTPATIENT)
Dept: CARDIAC SURGERY | Facility: HOSPITAL | Age: 70
End: 2025-01-10
Payer: MEDICARE

## 2025-01-10 ENCOUNTER — DOCUMENTATION (OUTPATIENT)
Dept: CARDIOLOGY | Facility: HOSPITAL | Age: 70
End: 2025-01-10

## 2025-01-10 VITALS
HEART RATE: 94 BPM | DIASTOLIC BLOOD PRESSURE: 108 MMHG | OXYGEN SATURATION: 98 % | SYSTOLIC BLOOD PRESSURE: 155 MMHG | BODY MASS INDEX: 25.16 KG/M2 | WEIGHT: 146.6 LBS

## 2025-01-10 DIAGNOSIS — I71.21 ANEURYSM OF ASCENDING AORTA WITHOUT RUPTURE (CMS-HCC): Primary | ICD-10-CM

## 2025-01-10 DIAGNOSIS — Z95.2 STATUS POST AORTIC VALVE REPLACEMENT: ICD-10-CM

## 2025-01-10 DIAGNOSIS — Z95.828 STATUS POST ASCENDING AORTIC REPLACEMENT: ICD-10-CM

## 2025-01-10 PROCEDURE — 3077F SYST BP >= 140 MM HG: CPT | Performed by: THORACIC SURGERY (CARDIOTHORACIC VASCULAR SURGERY)

## 2025-01-10 PROCEDURE — 3080F DIAST BP >= 90 MM HG: CPT | Performed by: THORACIC SURGERY (CARDIOTHORACIC VASCULAR SURGERY)

## 2025-01-10 PROCEDURE — 99211 OFF/OP EST MAY X REQ PHY/QHP: CPT | Performed by: THORACIC SURGERY (CARDIOTHORACIC VASCULAR SURGERY)

## 2025-01-10 PROCEDURE — 1157F ADVNC CARE PLAN IN RCRD: CPT | Performed by: THORACIC SURGERY (CARDIOTHORACIC VASCULAR SURGERY)

## 2025-01-10 PROCEDURE — 1159F MED LIST DOCD IN RCRD: CPT | Performed by: THORACIC SURGERY (CARDIOTHORACIC VASCULAR SURGERY)

## 2025-01-10 NOTE — PROGRESS NOTES
cardiac rehab referral  Received: 4 days ago  YOSVANY Calvo-WARREN sent to Sofi Jimenez, RN  Can we place a cardiac rehab referral to Dr. Muniz for this patient. I don't think this was done. Her stress test was completed.    1/10/25 Cardiac Rehab referral pended to Dr. Muniz

## 2025-01-15 ENCOUNTER — CLINICAL SUPPORT (OUTPATIENT)
Dept: CARDIAC REHAB | Facility: CLINIC | Age: 70
End: 2025-01-15
Payer: MEDICARE

## 2025-01-15 VITALS
WEIGHT: 140.5 LBS | HEIGHT: 64 IN | OXYGEN SATURATION: 95 % | DIASTOLIC BLOOD PRESSURE: 94 MMHG | SYSTOLIC BLOOD PRESSURE: 132 MMHG | BODY MASS INDEX: 23.99 KG/M2

## 2025-01-15 DIAGNOSIS — Z95.2 S/P AVR: ICD-10-CM

## 2025-01-15 ASSESSMENT — DUKE ACTIVITY SCORE INDEX (DASI)
CAN YOU DO YARD WORK LIKE RAKING LEAVES, WEEDING OR PUSHING A MOWER: YES
CAN YOU PARTICIPATE IN MODERATE RECREATIONAL ACTIVITIES LIKE GOLF, BOWLING, DANCING, DOUBLES TENNIS OR THROWING A BASEBALL OR FOOTBALL: YES
TOTAL_SCORE: 58.2
CAN YOU WALK A BLOCK OR TWO ON LEVEL GROUND: YES
CAN YOU DO MODERATE WORK AROUND THE HOUSE LIKE VACUUMING, SWEEPING FLOORS OR CARRYING GROCERIES: YES
CAN YOU CLIMB A FLIGHT OF STAIRS OR WALK UP A HILL: YES
CAN YOU DO LIGHT WORK AROUND THE HOUSE LIKE DUSTING OR WASHING DISHES: YES
CAN YOU HAVE SEXUAL RELATIONS: YES
CAN YOU TAKE CARE OF YOURSELF (EAT, DRESS, BATHE, OR USE TOILET): YES
CAN YOU PARTICIPATE IN STRENOUS SPORTS LIKE SWIMMING, SINGLES TENNIS, FOOTBALL, BASKETBALL, OR SKIING: YES
CAN YOU WALK INDOORS, SUCH AS AROUND YOUR HOUSE: YES
CAN YOU DO HEAVY WORK AROUND THE HOUSE LIKE SCRUBBING FLOORS OR LIFTING AND MOVING HEAVY FURNITURE: YES
DASI METS SCORE: 9.9
CAN YOU RUN A SHORT DISTANCE: YES

## 2025-01-15 ASSESSMENT — PATIENT HEALTH QUESTIONNAIRE - PHQ9
8. MOVING OR SPEAKING SO SLOWLY THAT OTHER PEOPLE COULD HAVE NOTICED. OR THE OPPOSITE, BEING SO FIGETY OR RESTLESS THAT YOU HAVE BEEN MOVING AROUND A LOT MORE THAN USUAL: SEVERAL DAYS
5. POOR APPETITE OR OVEREATING: SEVERAL DAYS
4. FEELING TIRED OR HAVING LITTLE ENERGY: SEVERAL DAYS
1. LITTLE INTEREST OR PLEASURE IN DOING THINGS: NOT AT ALL
9. THOUGHTS THAT YOU WOULD BE BETTER OFF DEAD, OR OF HURTING YOURSELF: SEVERAL DAYS
SUM OF ALL RESPONSES TO PHQ QUESTIONS 1-9: 8
SUM OF ALL RESPONSES TO PHQ QUESTIONS 1-9: 8
2. FEELING DOWN, DEPRESSED OR HOPELESS: SEVERAL DAYS
6. FEELING BAD ABOUT YOURSELF - OR THAT YOU ARE A FAILURE OR HAVE LET YOURSELF OR YOUR FAMILY DOWN: SEVERAL DAYS
SUM OF ALL RESPONSES TO PHQ9 QUESTIONS 1 & 2: 1
3. TROUBLE FALLING OR STAYING ASLEEP OR SLEEPING TOO MUCH: SEVERAL DAYS
7. TROUBLE CONCENTRATING ON THINGS, SUCH AS READING THE NEWSPAPER OR WATCHING TELEVISION: SEVERAL DAYS

## 2025-01-15 NOTE — PROGRESS NOTES
Cardiac Rehabilitation Initial Treatment Plan    Name: Kiley Ward   Medical Record Number: [id number]  YOB: 1955   Age: 69 y.o.  Today’s Date: 1/15/2025   Primary Care Physician: Eloise Naylor DO   Referring Physician: Raul Muniz MD   Program Location: [address]      General  Primary Diagnosis: S/P AVR   Onset/Date of Diagnosis: 11/05/24 11/05/2024  Session #: 0 / 36  AACVPR Risk Stratification: Moderate   Falls Risk: Low    Psychosocial Initial Assessment:   Stress Assessment  Pre PHQ-9: 8     Pre PHQ-9: 8     Sent PH-Q 9 to MD if score > 20: No; score < 20  1/15/25 spoke with patient about PHQ9 and she stated she misunderstood the questions and is happy with her recovery and it will be re administered  when she starts cardiac rehab.    Quality of Life Survey: SF-36   SF-36 Pre Post   Physical Component Score TBD TBD   Mental Component Score TBD TBD       Pt reported/currently experiencing stress: Yes; Stress; Severity: marked  Patient uses stress management skills: No   History of: no history of anxiety or depression  Currently seeing a mental health provider: No  Social Support: Yes, Whom:Spouse and friends    Learning Assessment  Learning assessment/barriers: None  Preferred learning method: Auditory, Visual, Reading handout, and Writing handout  Barriers: None  Comments:    Stages of Change: Preparation    Psychosocial Plan  Goal Status: Initial Assessment; goals not yet started  Psychosocial Goals: Demonstrating proper techniques for stress management, Maintain or lower PH-Q 9 score by discharge, and Identify strategies for managing depression  Psychosocial Interventions/Education:   To be done in Cardiac Rehab.    Nutrition Initial Assessment:  Diet Habit Survey: Picture Your Plate  Pre:    Post: To be done at discharge.    Survey results reviewed with Dietician: Not at this time    Lipids Assessment  Current Dietary Guidelines:  None  Barriers to dietary change:  "no  Hyperlipidemia: No   Lab Results   Component Value Date    CHOL 154 01/08/2025    HDL 59.5 01/08/2025    LDLCALC 81 01/08/2025    TRIG 70 01/08/2025     Diabetes Assessment  History of Diabetes: No  Lab Results   Component Value Date    HGBA1C 5.6 10/18/2022      Weight Management  Weight: 63.7 kg (140 lb 8 oz)  Height: 162.6 cm (5' 4.02\")  BMI (Calculated): 24.1   Nutrition Plan  Goal Status: Initial Assessment; goals not yet started  Nutrition Goals: Lipid Goal: HDL>45, LDL <70, Total <180, Trigs <150, Improve Diet Habit Survey score by 5-10 points by discharge, Adapt a low-sodium, DASH diet prior to discharge, Adapt a Mediterranean focused diet prior to discharge, Learn how to read and interpret nutrition labels prior to discharge, and Maintain body weight  Nutrition Interventions/Education:   To be done in Cardiac Rehab.    Exercise Initial Assessment:  Home Exercise  Yes  Mode: Treadmill, Elliptical and Bike  Frequency: 3 times/week  Duration: 45 minutes  Home Exercise Prescription given: To be given prior to discharge from program.    Exercise Prescription  Exercise Prescription based on: Pre-rehab Stress Test  Resistance Training: No   Frequency:  3 days/week  Mode: Treadmill, NuStep, and Recumbent Cycle  Duration: 30 total aerobic minutes  Intensity: RPE 12-16  Target HR:   102-112 bpm; THR calculated via exercise Stress Test by program EP  MET Level: 4.7  Patient wears supplemental O2: No  Modality Workload METs Duration (minutes)   Pre-Exercise      Treadmill 3.3 mph @ 2.5 % 4.7 10 :00   NuStep 4000 8 load @ 100 gay 3.9 10 :00   Recumbent Bike 5 load @ 65 rpms 4.5 10 :00   Post-Exercise           Exercise Plan  Goal Status: Initial Assessment; goals not yet started  Exercise Goals: Increase exercise MET level by 5-10% each week, Increase total exercise duration to 30-45 minutes, and Obtain 150 minutes/week of moderate intensity aerobic exercise  Exercise Interventions/Education:   To be done in " "Cardiac Rehab.    Other Core Components/Risk Factor Initial Assessment:  Medication Adherence  Medication compliance: Yes  Uses pill box/organizer: Yes   Carries medication list: Yes   Is patient prescribed Nitroglycerin? No  Current Medications:   Medication Documentation Review Audit       Reviewed by Tootie Roca RN (Registered Nurse) on 01/10/25 at 0804      Medication Order Taking? Sig Documenting Provider Last Dose Status   acetaminophen (Tylenol) 325 mg tablet [id number]  Take 2 tablets (650 mg) by mouth every 6 hours if needed for mild pain (1 - 3) or moderate pain (4 - 6). LEONIDES Petersen  Active   aspirin 81 mg EC tablet [id number]  Take 1 tablet (81 mg) by mouth once daily. LEONIDES Petersen  Active   atorvastatin (Lipitor) 10 mg tablet [id number]  Take 1 tablet (10 mg) by mouth once daily at bedtime. Eloise Nalyor DO  Active   calcitriol (Rocaltrol) 0.25 mcg capsule [id number]  Take 1 capsule (0.25 mcg) by mouth once daily. Historical Provider, MD  Active   carvedilol (Coreg) 6.25 mg tablet [id number]  Take 1 tablet (6.25 mg) by mouth 2 times daily (morning and late afternoon). Raul Muniz MD  Active   dexlansoprazole (Dexilant) 60 mg DR capsule [id number]  Take 1 capsule (60 mg) by mouth once daily. Eloise Naylor DO  Active   ergocalciferol (Vitamin D-2) 1.25 MG (30315 UT) capsule [id number]  1 capsule (50,000 Units) every 14 (fourteen) days. Historical Provider, MD  Active   famotidine (Pepcid) 40 mg tablet [id number]  Take 1 tablet (40 mg) by mouth once daily. Historical Provider, MD  Active   polyethylene glycol (Glycolax, Miralax) 17 gram packet [id number]  Take 17 g by mouth once daily as needed (constipation). LEONIDES Petersen  Active                     Blood Pressure Management  History of Hypertension: Yes   Medication Changes: No   Resting BP: BP (!) 132/94   Ht 1.626 m (5' 4.02\")   Wt 63.7 kg (140 lb 8 oz)   SpO2 95%   BMI 24.10 kg/m²    Heart Failure " Management  Hx of Heart Failure: No  Smoking/Tobacco Assessment  Social History     Tobacco Use    Smoking status: Never    Smokeless tobacco: Never   Vaping Use    Vaping status: Never Used   Substance Use Topics    Alcohol use: Yes     Alcohol/week: 4.0 standard drinks of alcohol     Types: 4 Glasses of wine per week    Drug use: Never      Other Core Component Plan  Goal Status: Initial Assessment; goals not yet started  Other Core Component Goals: Medication compliance, Verbalize medication usage and drug actions by discharge, and Achieve resting BP of < 130/80 by discharge  Other Core Component Interventions/Education:   To be done in Cardiac Rehab.    Individual Patient Goals:  To be able to do piliates upon discharge  To be able to resume weight training by the midpoint    Goal Status: Initial Assessment; goals not yet started    Staff Comments:  Initial assessment done In Person at [facility].  Patient will be contacted after the recovery period from the scheduled surgery on 1/28/2025.      Rehab Staff Signature: CHARLES Barger

## 2025-01-21 ENCOUNTER — PRE-ADMISSION TESTING (OUTPATIENT)
Dept: PREADMISSION TESTING | Facility: HOSPITAL | Age: 70
End: 2025-01-21
Payer: MEDICARE

## 2025-01-21 VITALS
TEMPERATURE: 98.3 F | HEIGHT: 64 IN | BODY MASS INDEX: 25.27 KG/M2 | SYSTOLIC BLOOD PRESSURE: 158 MMHG | WEIGHT: 148 LBS | OXYGEN SATURATION: 97 % | DIASTOLIC BLOOD PRESSURE: 97 MMHG | HEART RATE: 89 BPM | RESPIRATION RATE: 18 BRPM

## 2025-01-21 DIAGNOSIS — Z95.2 S/P AVR: ICD-10-CM

## 2025-01-21 DIAGNOSIS — C43.31 MELANOMA OF NOSE (MULTI): Primary | ICD-10-CM

## 2025-01-21 LAB
ABO GROUP (TYPE) IN BLOOD: NORMAL
ANTIBODY SCREEN: NORMAL
ERYTHROCYTE [DISTWIDTH] IN BLOOD BY AUTOMATED COUNT: 14.3 % (ref 11.5–14.5)
HCT VFR BLD AUTO: 36.6 % (ref 36–46)
HGB BLD-MCNC: 11.6 G/DL (ref 12–16)
MCH RBC QN AUTO: 28.6 PG (ref 26–34)
MCHC RBC AUTO-ENTMCNC: 31.7 G/DL (ref 32–36)
MCV RBC AUTO: 90 FL (ref 80–100)
NRBC BLD-RTO: 0 /100 WBCS (ref 0–0)
PLATELET # BLD AUTO: 344 X10*3/UL (ref 150–450)
RBC # BLD AUTO: 4.05 X10*6/UL (ref 4–5.2)
RH FACTOR (ANTIGEN D): NORMAL
WBC # BLD AUTO: 4.9 X10*3/UL (ref 4.4–11.3)

## 2025-01-21 PROCEDURE — 36415 COLL VENOUS BLD VENIPUNCTURE: CPT

## 2025-01-21 PROCEDURE — 85027 COMPLETE CBC AUTOMATED: CPT

## 2025-01-21 PROCEDURE — 86901 BLOOD TYPING SEROLOGIC RH(D): CPT

## 2025-01-21 ASSESSMENT — ENCOUNTER SYMPTOMS
NECK NEGATIVE: 1
NEUROLOGICAL NEGATIVE: 1
EYES NEGATIVE: 1
CARDIOVASCULAR NEGATIVE: 1
MUSCULOSKELETAL NEGATIVE: 1
GASTROINTESTINAL NEGATIVE: 1
ENDOCRINE NEGATIVE: 1
CONSTITUTIONAL NEGATIVE: 1
SINUS CONGESTION: 1
RESPIRATORY NEGATIVE: 1

## 2025-01-21 ASSESSMENT — DUKE ACTIVITY SCORE INDEX (DASI)
CAN YOU DO MODERATE WORK AROUND THE HOUSE LIKE VACUUMING, SWEEPING FLOORS OR CARRYING GROCERIES: YES
CAN YOU DO HEAVY WORK AROUND THE HOUSE LIKE SCRUBBING FLOORS OR LIFTING AND MOVING HEAVY FURNITURE: YES
CAN YOU CLIMB A FLIGHT OF STAIRS OR WALK UP A HILL: YES
CAN YOU DO YARD WORK LIKE RAKING LEAVES, WEEDING OR PUSHING A MOWER: YES
TOTAL_SCORE: 58.2
CAN YOU PARTICIPATE IN STRENOUS SPORTS LIKE SWIMMING, SINGLES TENNIS, FOOTBALL, BASKETBALL, OR SKIING: YES
CAN YOU HAVE SEXUAL RELATIONS: YES
CAN YOU WALK A BLOCK OR TWO ON LEVEL GROUND: YES
CAN YOU TAKE CARE OF YOURSELF (EAT, DRESS, BATHE, OR USE TOILET): YES
CAN YOU RUN A SHORT DISTANCE: YES
CAN YOU DO LIGHT WORK AROUND THE HOUSE LIKE DUSTING OR WASHING DISHES: YES
DASI METS SCORE: 9.9
CAN YOU WALK INDOORS, SUCH AS AROUND YOUR HOUSE: YES
CAN YOU PARTICIPATE IN MODERATE RECREATIONAL ACTIVITIES LIKE GOLF, BOWLING, DANCING, DOUBLES TENNIS OR THROWING A BASEBALL OR FOOTBALL: YES

## 2025-01-21 ASSESSMENT — LIFESTYLE VARIABLES: SMOKING_STATUS: NONSMOKER

## 2025-01-21 NOTE — CPM/PAT H&P
CPM/PAT Evaluation       Name: Kiley Ward (Kiley Ward)  /Age: 1955/69 y.o.     Visit Type:   In-Person       Chief Complaint: Melanoma of nose (Multi)    HPI  Patient is a 69-year-old female with a past medical history significant for aortic aneurysm status post AVR and ascending aorta surgery, GERD, Crocker's esophagus, breast cancer status post chemo and radiation and newly diagnosed melanoma of nose.  Patient is being evaluated in CPM in anticipation of nasal reconstruction, cartilage skin graft and forehead flap with Dr. Shipman on 2025.  Past Medical History:   Diagnosis Date    ARMD (age related macular degeneration)     Ascending aorta dilation (CMS-HCC)     Crocker's esophagus     Breast cancer (Multi)     s/p lumpectomy and sentinel lymph node biopsy chemotherapy and radiation.    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    Mass of breast 2024    Melanoma of nose (Multi)     Murmur     Neck pain 2024    Nipple discharge in female 2024    Pain of right breast 2024    Personal history of irradiation     PONV (postoperative nausea and vomiting)     Primary malignant neoplasm of right female breast 2024    PVD (posterior vitreous detachment), both eyes     Shoulder pain 2024    Vitamin D deficiency        Past Surgical History:   Procedure Laterality Date    ASCENDING AORTIC ANEURYSM REPAIR W/ TISSUE AORTIC VALVE REPLACEMENT      BREAST LUMPECTOMY Bilateral     CARDIAC CATHETERIZATION N/A 2024    Procedure: Left Heart Cath with Coronary Angiography and LV;  Surgeon: Raul HERNANDEZ  MD Flavio;  Location: [facility] Cardiac Cath Lab;  Service: Cardiovascular;  Laterality: N/A;    COLONOSCOPY      ESOPHAGOGASTRODUODENOSCOPY  08/2023       Patient Sexual activity questions deferred to the physician.    Family History   Problem Relation Name Age of Onset    Breast cancer Mother      Prostate cancer Father      Breast cancer Sister      Glaucoma Neg Hx         No Known Allergies    Prior to Admission medications    Medication Sig Start Date End Date Taking? Authorizing Provider   acetaminophen (Tylenol) 325 mg tablet Take 2 tablets (650 mg) by mouth every 6 hours if needed for mild pain (1 - 3) or moderate pain (4 - 6). 11/9/24  Yes LEONIDES Petersen   aspirin 81 mg EC tablet Take 1 tablet (81 mg) by mouth once daily. 11/9/24 11/9/25 Yes LEONIDES Petersen   atorvastatin (Lipitor) 10 mg tablet Take 1 tablet (10 mg) by mouth once daily at bedtime. 10/10/24  Yes Ante T Pletikosic, DO   calcitriol (Rocaltrol) 0.25 mcg capsule Take 1 capsule (0.25 mcg) by mouth once daily. 4/4/17  Yes Historical Provider, MD   carvedilol (Coreg) 6.25 mg tablet Take 1 tablet (6.25 mg) by mouth 2 times daily (morning and late afternoon). 9/25/24 9/25/25 Yes Raul Muniz MD   dexlansoprazole (Dexilant) 60 mg DR capsule Take 1 capsule (60 mg) by mouth once daily. 10/10/24  Yes Ante T Pletikosic, DO   ergocalciferol (Vitamin D-2) 1.25 MG (92751 UT) capsule 1 capsule (50,000 Units) every 14 (fourteen) days. 9/19/23  Yes Historical Provider, MD   famotidine (Pepcid) 40 mg tablet Take 1 tablet (40 mg) by mouth once daily.   Yes Historical Provider, MD   polyethylene glycol (Glycolax, Miralax) 17 gram packet Take 17 g by mouth once daily as needed (constipation). 11/9/24 1/21/25  LEONIDES Petersen        PAT ROS:   Constitutional:   neg    Neuro/Psych:   neg    Eyes:   neg    Ears:   neg    Nose:    sinus congestion  Mouth:   Throat:   neg    Neck:   neg    Cardio:   neg    Respiratory:   neg    Endocrine:   neg   "  GI:   neg    :   neg    Musculoskeletal:   neg    Hematologic:   neg    Skin:  neg        Physical Exam  Vitals reviewed.   Constitutional:       Appearance: Normal appearance.   HENT:      Head: Normocephalic and atraumatic.      Nose: Congestion present.      Comments: Nasal bandage intact     Mouth/Throat:      Mouth: Mucous membranes are moist.   Cardiovascular:      Rate and Rhythm: Normal rate and regular rhythm.      Pulses: Normal pulses.      Heart sounds: Normal heart sounds.   Pulmonary:      Effort: Pulmonary effort is normal.      Breath sounds: Normal breath sounds.   Abdominal:      Palpations: Abdomen is soft.   Musculoskeletal:         General: Normal range of motion.      Cervical back: Normal range of motion.   Skin:     General: Skin is warm.   Neurological:      General: No focal deficit present.      Mental Status: She is alert and oriented to person, place, and time.   Psychiatric:         Mood and Affect: Mood normal.         Behavior: Behavior normal.          PAT AIRWAY:   Airway:     Mallampati::  II    TM distance::  >3 FB    Neck ROM::  Full  normal        Testing/Diagnostic:     Patient Specialist/PCP:     Visit Vitals  BP (!) 160/112   Pulse 89   Temp 36.8 °C (98.3 °F)   Resp 18   Ht 1.626 m (5' 4\")   Wt 67.1 kg (148 lb)   SpO2 97%   BMI 25.40 kg/m²   OB Status Postmenopausal   Smoking Status Never   BSA 1.74 m²       DASI Risk Score      Flowsheet Row Pre-Admission Testing from 1/21/2025 in [facility] Clinical Support from 1/15/2025 in [facility]   Can you take care of yourself (eat, dress, bathe, or use toilet)?  2.75 filed at 01/21/2025 1519 2.75 filed at 01/15/2025 1400   Can you walk indoors, such as around your house? 1.75 filed at 01/21/2025 1519 1.75 filed at 01/15/2025 1400   Can you walk a block or two on level ground?  2.75 filed at 01/21/2025 1519 2.75 filed at 01/15/2025 1400   Can you climb a flight of stairs or walk up a hill? " 5.5 filed at 01/21/2025 1519 5.5 filed at 01/15/2025 1400   Can you run a short distance? 8 filed at 01/21/2025 1519 8 filed at 01/15/2025 1400   Can you do light work around the house like dusting or washing dishes? 2.7 filed at 01/21/2025 1519 2.7 filed at 01/15/2025 1400   Can you do moderate work around the house like vacuuming, sweeping floors or carrying groceries? 3.5 filed at 01/21/2025 1519 3.5 filed at 01/15/2025 1400   Can you do heavy work around the house like scrubbing floors or lifting and moving heavy furniture?  8 filed at 01/21/2025 1519 8 filed at 01/15/2025 1400   Can you do yard work like raking leaves, weeding or pushing a mower? 4.5 filed at 01/21/2025 1519 4.5 filed at 01/15/2025 1400   Can you have sexual relations? 5.25 filed at 01/21/2025 1519 5.25 filed at 01/15/2025 1400   Can you participate in moderate recreational activities like golf, bowling, dancing, doubles tennis or throwing a baseball or football? 6 filed at 01/21/2025 1519 6 filed at 01/15/2025 1400   Can you participate in strenous sports like swimming, singles tennis, football, basketball, or skiing? 7.5 filed at 01/21/2025 1519 7.5 filed at 01/15/2025 1400   DASI SCORE 58.2 filed at 01/21/2025 1519 58.2 filed at 01/15/2025 1400   METS Score (Will be calculated only when all the questions are answered) 9.9 filed at 01/21/2025 1519 9.9 filed at 01/15/2025 1400          Caprini DVT Assessment      Flowsheet Row Pre-Admission Testing from 1/21/2025 in [facility] Admission (Discharged) from 11/5/2024 in [facility] [facility] with Efrain Ramirez MD   DVT Score (IF A SCORE IS NOT CALCULATING, MUST SELECT A BMI TO COMPLETE) 10 filed at 01/21/2025 1539 8 filed at 11/05/2024 1233   Medical Factors Present cancer, chemotherapy, or previous malignancy filed at 01/21/2025 1539 Central venous access filed at 11/05/2024 1233   Surgical Factors Major surgery planned, lasting over 3 hours filed at  01/21/2025 1539 Major surgery planned, lasting over 3 hours filed at 11/05/2024 1233   BMI (BMI MUST BE CHOSEN) 30 or less filed at 01/21/2025 1539 30 or less filed at 11/05/2024 1233          Modified Frailty Index      Flowsheet Row Pre-Admission Testing from 1/21/2025 in [facility] Pre-Admission Testing from 10/17/2024 in [facility]   Non-independent functional status (problems with dressing, bathing, personal grooming, or cooking) 0 filed at 01/21/2025 1540 0 filed at 10/17/2024 1036   History of diabetes mellitus  0 filed at 01/21/2025 1540 0 filed at 10/17/2024 1036   History of COPD 0 filed at 01/21/2025 1540 0 filed at 10/17/2024 1036   History of CHF No filed at 01/21/2025 1540 No filed at 10/17/2024 1036   History of MI 0 filed at 01/21/2025 1540 0 filed at 10/17/2024 1036   History of Percutaneous Coronary Intervention, Cardiac Surgery, or Angina No filed at 01/21/2025 1540 No filed at 10/17/2024 1036   Hypertension requiring the use of medication  0 filed at 01/21/2025 1540 0.0909 filed at 10/17/2024 1036   Peripheral vascular disease 0 filed at 01/21/2025 1540 0 filed at 10/17/2024 1036   Impaired sensorium (cognitive impairement or loss, clouding, or delirium) 0 filed at 01/21/2025 1540 0 filed at 10/17/2024 1036   TIA or CVA withouy residual deficit 0 filed at 01/21/2025 1540 0 filed at 10/17/2024 1036   Cerebrovascular accident with deficit 0 filed at 01/21/2025 1540 0 filed at 10/17/2024 1036   Modified Frailty Index Calculator 0 filed at 01/21/2025 1540 .[id number] filed at 10/17/2024 1036          CHADS2 Stroke Risk  Current as of yesterday        N/A 3 to 100%: High Risk   2 to < 3%: Medium Risk   0 to < 2%: Low Risk     Last Change: N/A          This score determines the patient's risk of having a stroke if the patient has atrial fibrillation.        This score is not applicable to this patient. Components are not calculated.          Revised Cardiac Risk Index       Flowsheet Row Pre-Admission Testing from 1/21/2025 in [facility] Pre-Admission Testing from 10/17/2024 in [facility]   High-Risk Surgery (Intraperitoneal, Intrathoracic,Suprainguinal vascular) 0 filed at 01/21/2025 1539 1 filed at 10/17/2024 1036   History of ischemic heart disease (History of MI, History of positive exercuse test, Current chest paint considered due to myocardial ischemia, Use of nitrate therapy, ECG with pathological Q Waves) 0 filed at 01/21/2025 1539 0 filed at 10/17/2024 1036   History of congestive heart failure (pulmonary edemia, bilateral rales or S3 gallop, Paroxysmal nocturnal dyspnea, CXR showing pulmonary vascular redistribution) 0 filed at 01/21/2025 1539 0 filed at 10/17/2024 1036   History of cerebrovascular disease (Prior TIA or stroke) 0 filed at 01/21/2025 1539 0 filed at 10/17/2024 1036   Pre-operative insulin treatment 0 filed at 01/21/2025 1539 0 filed at 10/17/2024 1036   Pre-operative creatinine>2 mg/dl 0 filed at 01/21/2025 1539 0 filed at 10/17/2024 1036   Revised Cardiac Risk Calculator 0 filed at 01/21/2025 1539 1 filed at 10/17/2024 1036          Apfel Simplified Score      Flowsheet Row Pre-Admission Testing from 1/21/2025 in [facility] Pre-Admission Testing from 10/17/2024 in [facility]   Smoking status 1 filed at 01/21/2025 1540 0 filed at 10/17/2024 1036   History of motion sickness or PONV  1 filed at 01/21/2025 1540 0 filed at 10/17/2024 1036   Use of postoperative opioids 1 filed at 01/21/2025 1540 1 filed at 10/17/2024 1036   Gender - Female 1=Yes filed at 01/21/2025 1540 1=Yes filed at 10/17/2024 1036   Apfel Simplified Score Calculator 4 filed at 01/21/2025 1540 2 filed at 10/17/2024 1036          Risk Analysis Index Results This Encounter    No data found in the last 10 encounters.       Stop Bang Score      Flowsheet Row Pre-Admission Testing from 1/21/2025 in [facility]  Admission (Discharged) from 11/5/2024 in [facility] 3 with Efrain Ramirez MD   Do you snore loudly? 0 filed at 01/21/2025 1520 0 filed at 11/05/2024 0715   Do you often feel tired or fatigued after your sleep? 1 filed at 01/21/2025 1520 0 filed at 11/05/2024 0715   Has anyone ever observed you stop breathing in your sleep? 0 filed at 01/21/2025 1520 0 filed at 11/05/2024 0715   Do you have or are you being treated for high blood pressure? 1 filed at 01/21/2025 1520 0 filed at 11/05/2024 0715   Recent BMI (Calculated) 25.4 filed at 01/21/2025 1520 26.2 filed at 11/05/2024 0715   Is BMI greater than 35 kg/m2? 0=No filed at 01/21/2025 1520 0=No filed at 11/05/2024 0715   Age older than 50 years old? 1=Yes filed at 01/21/2025 1520 1=Yes filed at 11/05/2024 0715   Is your neck circumference greater than 17 inches (Male) or 16 inches (Female)? 0 filed at 01/21/2025 1520 --   Gender - Male 0=No filed at 01/21/2025 1520 0=No filed at 11/05/2024 0715   STOP-BANG Total Score 3 filed at 01/21/2025 1520 --          Prodigy: High Risk  Total Score: 8              Prodigy Age Score           ARISCAT Score for Postoperative Pulmonary Complications    No data to display       Mi Perioperative Risk for Myocardial Infarction or Cardiac Arrest (LUIS ANTONIO)    No data to display         Assessment and Plan:     Anesthesia  The patient notes anesthesia complications in the past related to PONV    Neurology  The patient has no neurological diagnoses or significant findings on chart review, clinical presentation, and evaluation. No grossly apparent neurological perioperative risk. The patient is at increased risk for postoperative delirium secondary to age 65 or older. The patient is at increased risk for perioperative stroke secondary to female gender, general anesthesia, operative time >2.5 hours. Handouts for preoperative brain exercises given to patient.    HEENT/Airway  The patient has Melanoma of nose. No  documented or reported history of airway difficulty.     Cardiovascular  The patient is scheduled for non-cardiac surgery associated with elevated risk. The patient has no major cardiac contraindications to non- cardiac surgery.  RCRI  The patient meets 0 RCRI criteria and therefor has a 3.9% risk of major adverse cardiac complications.  METS  The patient's functional capacity is greater than 4 METS.  EKG  The patient has no EKG or echocardiographic changes concerning for myocardial ischemia.   Heart Failure  The patient has no known history of heart failure.  Additionally, the patient reports no symptoms of heart failure and demonstrates no signs of heart failure.  Hypertension Evaluation  The patient has no known history of hypertension and has a normal blood pressure today.  Heart Rhythm Evaluation  The patient has no history of arrhythmias.  Heart Valve Evaluation  The patient has a known history of valvular heart disease.  Per patient's prior studies, the patient is s/p AVR   Cardiology Evaluation  The patient follows with cardiology, Dr. Ramirez. Patient was last seen 1-10-25. Per note,   Patient has recovered nicely from her open heart surgery. At this time she may resume her normal activities including driving, exercising, and and may begin with cardiac rehab. Patient instructed to return to clinic as needed.   The patient has a 30-day risk for MACE of 0 predictors, 3.9% risk for cardiac death, nonfatal myocardial infarction, and nonfatal cardiac arrest.  LUIS ANTONIO score which indicates a 0.1% risk of intraoperative or 30-day postoperative MACE (major adverse cardiac event).    Pulmonary  No significant findings on chart review or clinical presentation and evaluation. The patient is at increased risk of perioperative pulmonary complications secondary to advanced age greater than 60.    The patient has a stop bang score of 3, which places patient at intermediate risk for having SAGAR.    ARISCAT 26, Intermediate,  13.3% risk of in-hospital postoperative pulmonary complications  PRODIGY 8, intermediate risk of respiratory depression episode. Patient given PI sheet for preoperative deep breathing exercises.    Hematology  No diagnoses or significant findings on chart review or clinical presentation and evaluation.  Antiplatelet management   The patient is currently receiving antiplatelet therapy for history of DVT/PE, s/p AVR.  Anticoagulation management  The patient is not currently receiving anticoagulation therapy.    Caprini score 10, high risk of perioperative VTE.     Patient instructed to ambulate as soon as possible postoperatively to decrease thromboembolic risk. Initiate mechanical DVT prophylaxis as soon as possible and initiate chemical prophylaxis when deemed safe from a bleeding standpoint post surgery.     Transfusion Evaluation  A type and screen was obtained given the likelihood for perioperative transfusion of blood or blood products.    Gastrointestinal  The patient has GERD    Eat 10- 0,  self-perceived oropharyngeal dysphagia scale (0-40)     Genitourinary  No diagnoses or significant findings on chart review or clinical presentation and evaluation.    Renal  No renal diagnoses or significant findings on chart review or clinical presentation and evaluation. The patient has specific risk factors associated with increased risk of perioperative renal complications related to age greater than 55.    Musculoskeletal  No diagnoses or significant findings on chart review or clinical presentation and evaluation.    Endocrine  Diabetes Evaluation  The patient has no history of diabetes mellitus.  Thyroid Disease Evaluation  The patient has no history of thyroid disease.    ID  No diagnoses or significant findings on chart review or clinical presentation and evaluation.    -Preoperative medication instructions were provided and reviewed with the patient.  Any additional testing or evaluation was explained to the  patient.  NPO Instructions were discussed, and the patient's questions were answered prior to conclusion of this encounter. Patient verbalized understanding of preoperative instructions. After Visit Summary given.      Recent Results (from the past 48 hours)   CBC    Collection Time: 01/21/25  3:49 PM   Result Value Ref Range    WBC 4.9 4.4 - 11.3 x10*3/uL    nRBC 0.0 0.0 - 0.0 /100 WBCs    RBC 4.05 4.00 - 5.20 x10*6/uL    Hemoglobin 11.6 (L) 12.0 - 16.0 g/dL    Hematocrit 36.6 36.0 - 46.0 %    MCV 90 80 - 100 fL    MCH 28.6 26.0 - 34.0 pg    MCHC 31.7 (L) 32.0 - 36.0 g/dL    RDW 14.3 11.5 - 14.5 %    Platelets 344 150 - 450 x10*3/uL   Type And Screen    Collection Time: 01/21/25  3:49 PM   Result Value Ref Range    ABO TYPE O     Rh TYPE POS     ANTIBODY SCREEN NEG       Nares Swab not obtained-pt had bandage on nose

## 2025-01-21 NOTE — PREPROCEDURE INSTRUCTIONS
Fasting Guidelines    NPO Instructions:    Do not eat any food after midnight the night before your surgery/procedure.  You may have up to TEN ounces of clear liquids until TWO hours before your instructed arrival time to the hospital. This includes water, black tea/coffee, (no milk or cream), apple juice, and/or electrolyte drinks (Gatorade).  You may chew gum up to TWO hours before your surgery/procedure.    Additional Instructions:    Avoid herbal supplements, multivitamins and NSAIDS (non-steroidal anti-inflammatory drugs) such as Advil, Aleve, Ibuprofen, Naproxen, Excedrin, Meloxicam or Celebrex for at least 7 days prior to surgery. May take Tylenol as needed.    Avoid tobacco and alcohol products for 24 hours prior to surgery.    CONTACT SURGEON'S OFFICE IF YOU DEVELOP:  * Fever = 100.4 F   * New respiratory symptoms (e.g. cough, shortness of breath, respiratory distress, sore throat)  * Recent loss of taste or smell  *Flu like symptoms such as headache, fatigue or gastrointestinal symptoms  * You develop any open sores, shingles, burning or painful urination   AND/OR:  * You no longer wish to have the surgery.  * Any other personal circumstances change that may lead to the need to cancel or defer this surgery.  *You were admitted to any hospital within one week of your planned procedure.    Seven/Six Days before Surgery:  Review your medication instructions, stop indicated medications    Day of Surgery:  Review your medication instructions, take indicated medications  Wear comfortable loose fitting clothing  Do not use moisturizers, creams, lotions or perfume  All jewelry and valuables should be left at home        [facility]  [phone]       Preoperative Brain Exercises    What are brain exercises?  A brain exercise is any activity that engages your thinking (cognitive) skills.    What types of activities are considered brain exercises?  Jigsaw puzzles, crossword puzzles,  word jumble, memory games, word search, and many more.  Many can be found free online or on your phone via a mobile christal.    Why should I do brain exercises before my surgery?  More recent research has shown brain exercise before surgery can lower the risk of postoperative delirium (confusion) which can be especially important for older adults.  Patients who did brain exercises for 5 to 10 hours the days before surgery, cut their risk of postoperative delirium in half up to 1 week after surgery.         [facility]    Preoperative Deep Breathing Exercises    Why it is important to do deep breathing exercises before my surgery?  Deep breathing exercises strengthen your breathing muscles.  This helps you to recover after your surgery and decreases the chance of breathing complications.      How are the deep breathing exercises done?  Sit straight with your back supported.  Breathe in deeply and slowly through your nose. Your lower rib cage should expand and your abdomen may move forward.  Hold that breath for 3 to 5 seconds.  Breathe out through pursed lips, slowly and completely.  Rest and repeat 10 times every hour while awake.  Rest longer if you become dizzy or lightheaded.         Patient and Family Education             Ways You Can Help Prevent Blood Clots             This handout explains some simple things you can do to help prevent blood clots.      Blood clots are blockages that can form in the body's veins. When a blood clot forms in your deep veins, it may be called a deep vein thrombosis, or DVT for short. Blood clots can happen in any part of the body where blood flows, but they are most common in the arms and legs. If a piece of a blood clot breaks free and travels to the lungs, it is called a pulmonary embolus (PE). A PE can be a very serious problem.         Being in the hospital or having surgery can raise your chances of getting a blood clot because you may not be well  enough to move around as much as you normally do.         Ways you can help prevent blood clots in the hospital         Wearing SCDs. SCDs stands for Sequential Compression Devices.   SCDs are special sleeves that wrap around your legs  They attach to a pump that fills them with air to gently squeeze your legs every few minutes.   This helps return the blood in your legs to your heart.   SCDs should only be taken off when walking or bathing.   SCDs may not be comfortable, but they can help save your life.               Wearing compression stockings - if your doctor orders them. These special snug fitting stockings gently squeeze your legs to help blood flow.       Walking. Walking helps move the blood in your legs.   If your doctor says it is ok, try walking the halls at least   5 times a day. Ask us to help you get up, so you don't fall.      Taking any blood thinning medicines your doctor orders.        Page 1 of 2     [facility]; 3/23   Ways you can help prevent blood clots at home       Wearing compression stockings - if your doctor orders them. ? Walking - to help move the blood in your legs.       Taking any blood thinning medicines your doctor orders.      Signs of a blood clot or PE      Tell your doctor or nurse know right away if you have of the problems listed below.    If you are at home, seek medical care right away. Call 911 for chest pain or problems breathing.               Signs of a blood clot (DVT) - such as pain,  swelling, redness or warmth in your arm or leg      Signs of a pulmonary embolism (PE) - such as chest     pain or feeling short of breath

## 2025-01-22 ENCOUNTER — TELEPHONE (OUTPATIENT)
Facility: CLINIC | Age: 70
End: 2025-01-22
Payer: MEDICARE

## 2025-01-22 NOTE — TELEPHONE ENCOUNTER
Spoke to patient about scheduling her for a virtual visit with Dr. Shipman to discuss surgical plan. Patient would like to hold off on scheduling virtual visit until she finds out from pathology if her margins are clear. She will call the office when she obtains those results.

## 2025-01-24 ENCOUNTER — TELEMEDICINE (OUTPATIENT)
Facility: CLINIC | Age: 70
End: 2025-01-24
Payer: MEDICARE

## 2025-01-24 DIAGNOSIS — C43.31 MELANOMA OF NOSE (MULTI): Primary | ICD-10-CM

## 2025-01-24 NOTE — H&P (VIEW-ONLY)
Call today to discuss plan for reconstruction            Discussed autologous auricular cartilage graft with paramedian forehead flap for nasal reconstruction, nasal valve repair, skin graft for lining of the underside of the PMFF, closure of the forehead donor side.     This visit was conducted by means of a two-way synchronous audio-only connection, and technical limitations of providing care through this modality, including that audio-only telephone technology may not always be HIPAA-compliant, were explained to the patient. Patient has explicitly consented to this telephone visit. I communicated with the the patient on the phone during this visit, and more than 50% of the time was spent in counseling and coordination of care.    Total time spent on this visit:  15 min        Duke Shipman MD      , Facial Plastic & Reconstructive Surgery        P: [phone]  F: [phone]

## 2025-01-24 NOTE — PROGRESS NOTES
Call today to discuss plan for reconstruction            Discussed autologous auricular cartilage graft with paramedian forehead flap for nasal reconstruction, nasal valve repair, skin graft for lining of the underside of the PMFF, closure of the forehead donor side.     This visit was conducted by means of a two-way synchronous audio-only connection, and technical limitations of providing care through this modality, including that audio-only telephone technology may not always be HIPAA-compliant, were explained to the patient. Patient has explicitly consented to this telephone visit. I communicated with the the patient on the phone during this visit, and more than 50% of the time was spent in counseling and coordination of care.    Total time spent on this visit:  15 min        Duke Shipman MD      , Facial Plastic & Reconstructive Surgery        P: [phone]  F: [phone]

## 2025-01-27 ENCOUNTER — ANESTHESIA EVENT (OUTPATIENT)
Dept: OPERATING ROOM | Facility: HOSPITAL | Age: 70
End: 2025-01-27
Payer: MEDICARE

## 2025-01-27 PROBLEM — M54.2 CHRONIC NECK PAIN: Status: ACTIVE | Noted: 2024-12-08

## 2025-01-27 PROBLEM — C50.919 BREAST CANCER (MULTI): Status: ACTIVE | Noted: 2025-01-27

## 2025-01-27 PROBLEM — B99.9 INFECTION REQUIRING CONTACT ISOLATION PRECAUTIONS: Status: ACTIVE | Noted: 2025-01-27

## 2025-01-27 PROBLEM — G89.29 CHRONIC NECK PAIN: Status: ACTIVE | Noted: 2024-12-08

## 2025-01-27 PROBLEM — C44.90 SKIN CANCER: Status: ACTIVE | Noted: 2025-01-27

## 2025-01-27 PROBLEM — H54.7 VISION LOSS: Status: ACTIVE | Noted: 2025-01-27

## 2025-01-27 PROBLEM — D68.9 COAGULOPATHY (MULTI): Status: ACTIVE | Noted: 2025-01-27

## 2025-01-27 PROBLEM — R11.2 PONV (POSTOPERATIVE NAUSEA AND VOMITING): Status: ACTIVE | Noted: 2025-01-27

## 2025-01-27 PROBLEM — Z98.890 PONV (POSTOPERATIVE NAUSEA AND VOMITING): Status: ACTIVE | Noted: 2025-01-27

## 2025-01-27 NOTE — ANESTHESIA PREPROCEDURE EVALUATION
"Patient: Kiley Ward    Procedure Information       Date/Time: 01/28/25 0645    Procedure: FOREHEAD FLAP, NASAL RECONSTRUCTION, CARTILAGE/SKIN GRAFT (Bilateral)    Location: [facility] OR 05 / Virtual [facility] OR    Surgeons: Duke Shipman MD              Patient is a 69-year-old female with a past medical history significant for aortic aneurysm status post AVR and ascending aorta surgery (11/2024), GERD, Crocker's esophagus, breast cancer status post chemo and radiation and newly diagnosed melanoma of nose.  Patient here for nasal reconstruction, cartilage skin graft and forehead flap with Dr. Shipman on 1-.         Relevant Problems   Anesthesia   (+) PONV (postoperative nausea and vomiting) (Severe PONV after heart surgery 12/2024 x 24 hours -- resolved with Emend)      Cardiac   (+) Aneurysm of ascending aorta without rupture (CMS-HCC)   (+) Ascending aortic aneurysm, unspecified whether ruptured (CMS-HCC)   (+) HTN (hypertension)   (+) Hyperlipidemia      Pulmonary (within normal limits)      Neuro (within normal limits)      GI  Hx diverticulosis   (+) Acid reflux (Hx Crocker's esophagus -- feels GERD in AM of surgery despite being NPO and taking Dexilant in AM of surgery)      /Renal (within normal limits)      Liver (within normal limits)      Endocrine  Vit. D deficiency   (+) Hyperthyroidism (Pt states she had ~20 years ago -- no problems since, on no meds)      Hematology   (+) ABLA (acute blood loss anemia)   (+) Acute deep vein thrombosis (DVT) of upper extremity (Multi) (Pt states she was told she had thrombus \"in carotid artery and in Left arm\" ~2039-9989 (at the time of COVID), was on blood thinners at that time, now off meds, no further problems)   (+) Chronic thromboembolic disease (Multi) (Unspecified clotting disorder)   (+) History of blood transfusion (With heart surgery)      Musculoskeletal   (+) Chronic neck pain   (+) Osteoarthritis (Hx shoulder pain)      HEENT   (+) " Vision loss (Macular degeneration of Left eye;  Posterior vitreous detachment of bilateral eyes)      ID   (+) Infection requiring contact isolation precautions (Hx cellulitis)      Skin   (+) Skin cancer (Melanoma of nose)      GYN   (+) Breast cancer (Multi) (Right-sided breast CA, Metastatic to axillary lymph nodes, S/p chemo/XRT to Bilateral breasts)              Past Medical History:   Diagnosis Date    ARMD (age related macular degeneration)      Ascending aorta dilation (CMS-HCC)      Crocker's esophagus      Breast cancer (Multi) 2010     s/p lumpectomy and sentinel lymph node biopsy chemotherapy and radiation.    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) 12/08/2024    Mass of breast 12/08/2024    Melanoma of nose (Multi)      Murmur      Neck pain 12/08/2024    Nipple discharge in female 12/08/2024    Pain of right breast 12/08/2024    Personal history of irradiation      PONV (postoperative nausea and vomiting)      Primary malignant neoplasm of right female breast 12/08/2024    PVD (posterior vitreous detachment), both eyes      Shoulder pain 12/08/2024    Vitamin D deficiency              Surgical History         Past Surgical History:   Procedure Laterality Date    ASCENDING AORTIC ANEURYSM REPAIR W/ TISSUE AORTIC VALVE REPLACEMENT        BREAST LUMPECTOMY Bilateral      CARDIAC CATHETERIZATION N/A 09/27/2024     Procedure: Left Heart Cath with Coronary Angiography and LV;  Surgeon: Raul Muniz MD;  Location: [facility] Cardiac Cath Lab;  Service: Cardiovascular;  Laterality: N/A;    COLONOSCOPY        ESOPHAGOGASTRODUODENOSCOPY   08/2023                      Clinical information reviewed:     Meds               NPO Detail:  No data recorded     Physical Exam    Airway  Mallampati: I  TM distance: >3 FB  Neck ROM: full     Cardiovascular - normal exam  Rhythm: regular  Rate: normal     Dental   (+) implants     Pulmonary - normal exam  Breath sounds clear to auscultation     Abdominal - normal exam  Abdomen: soft  Bowel sounds: normal     Other findings: Many implants scattered throughout mouth, all dentition solid/none loose per patient          Anesthesia Plan    History of general anesthesia?: yes  History of complications of general anesthesia?: yes    ASA 3     general   (Plan GETA/PIVx2 -- with current GERD symptoms, plan RSI)  The patient is not a current smoker.  Patient was previously instructed to abstain from smoking on day of procedure.  Patient did not smoke on day of procedure.  Education provided regarding risk of obstructive sleep apnea.  intravenous induction   Postoperative administration of opioids is intended.  Trial extubation is planned.  Anesthetic plan and risks discussed with patient and spouse.  Use of blood products discussed with patient and spouse who consented to blood products.    Plan discussed with CAA and attending.

## 2025-01-27 NOTE — PROGRESS NOTES
Pharmacy Medication History Review    Kiley Ward is a 69 y.o. female who is planned to be admitted for Melanoma of nose (Multi). Pharmacy reviewed the patient's [id number] medications for accuracy.    Medications ADDED:  none  Medications CHANGED:  none  Medications REMOVED:   none    Please review updated prior to admission medication list and comments regarding how patient may be taking medications differently by going to Admission tab --> Admission Orders --> Admit Orders / Review prior to admission medications.     Preferred pharmacy, last doses of medications, and allergies to be confirmed with patient by nursing the day of procedure.     Sources used to complete the med history include:  [facility]  Pharmacy dispense history  Patient interview  Chart Review  Care Everywhere     Below are additional concerns with the patient's PTA list.  none    Wendy Waterman  [facility]  Please reach out via Secure Chat for questions or call [employer]rible or [employer]

## 2025-01-28 ENCOUNTER — ANESTHESIA (OUTPATIENT)
Dept: OPERATING ROOM | Facility: HOSPITAL | Age: 70
End: 2025-01-28
Payer: MEDICARE

## 2025-01-28 ENCOUNTER — HOSPITAL ENCOUNTER (OUTPATIENT)
Facility: HOSPITAL | Age: 70
Discharge: HOME | End: 2025-01-29
Attending: OTOLARYNGOLOGY | Admitting: OTOLARYNGOLOGY
Payer: MEDICARE

## 2025-01-28 DIAGNOSIS — C43.31 MELANOMA OF NOSE (MULTI): Primary | ICD-10-CM

## 2025-01-28 PROBLEM — Z92.89 HISTORY OF BLOOD TRANSFUSION: Status: ACTIVE | Noted: 2025-01-28

## 2025-01-28 PROBLEM — I74.9 CHRONIC THROMBOEMBOLIC DISEASE (MULTI): Status: ACTIVE | Noted: 2025-01-28

## 2025-01-28 PROCEDURE — 88305 TISSUE EXAM BY PATHOLOGIST: CPT | Mod: TC,SUR | Performed by: OTOLARYNGOLOGY

## 2025-01-28 PROCEDURE — 15731 FOREHEAD FLAP W/VASC PEDICLE: CPT | Performed by: OTOLARYNGOLOGY

## 2025-01-28 PROCEDURE — 30465 REPAIR NASAL STENOSIS: CPT | Performed by: OTOLARYNGOLOGY

## 2025-01-28 PROCEDURE — 2500000001 HC RX 250 WO HCPCS SELF ADMINISTERED DRUGS (ALT 637 FOR MEDICARE OP): Performed by: ANESTHESIOLOGY

## 2025-01-28 PROCEDURE — 2500000004 HC RX 250 GENERAL PHARMACY W/ HCPCS (ALT 636 FOR OP/ED): Performed by: OTOLARYNGOLOGY

## 2025-01-28 PROCEDURE — 7100000011 HC EXTENDED STAY RECOVERY HOURLY - NURSING UNIT

## 2025-01-28 PROCEDURE — 7100000001 HC RECOVERY ROOM TIME - INITIAL BASE CHARGE: Performed by: OTOLARYNGOLOGY

## 2025-01-28 PROCEDURE — 2720000007 HC OR 272 NO HCPCS: Performed by: OTOLARYNGOLOGY

## 2025-01-28 PROCEDURE — 21235 EAR CARTILAGE GRAFT: CPT | Performed by: OTOLARYNGOLOGY

## 2025-01-28 PROCEDURE — 3700000002 HC GENERAL ANESTHESIA TIME - EACH INCREMENTAL 1 MINUTE: Performed by: OTOLARYNGOLOGY

## 2025-01-28 PROCEDURE — 2500000005 HC RX 250 GENERAL PHARMACY W/O HCPCS: Performed by: STUDENT IN AN ORGANIZED HEALTH CARE EDUCATION/TRAINING PROGRAM

## 2025-01-28 PROCEDURE — 7100000002 HC RECOVERY ROOM TIME - EACH INCREMENTAL 1 MINUTE: Performed by: OTOLARYNGOLOGY

## 2025-01-28 PROCEDURE — 2500000002 HC RX 250 W HCPCS SELF ADMINISTERED DRUGS (ALT 637 FOR MEDICARE OP, ALT 636 FOR OP/ED): Performed by: ANESTHESIOLOGY

## 2025-01-28 PROCEDURE — 15004 WOUND PREP F/N/HF/G: CPT | Performed by: OTOLARYNGOLOGY

## 2025-01-28 PROCEDURE — A30465 PR REPAIR NASAL CAVITY STENOSIS: Performed by: ANESTHESIOLOGY

## 2025-01-28 PROCEDURE — 7100000024 HC EXTENDED STAY RECOVERY PER MINUTE- PACU: Performed by: OTOLARYNGOLOGY

## 2025-01-28 PROCEDURE — 2500000004 HC RX 250 GENERAL PHARMACY W/ HCPCS (ALT 636 FOR OP/ED): Performed by: STUDENT IN AN ORGANIZED HEALTH CARE EDUCATION/TRAINING PROGRAM

## 2025-01-28 PROCEDURE — 2500000001 HC RX 250 WO HCPCS SELF ADMINISTERED DRUGS (ALT 637 FOR MEDICARE OP): Performed by: STUDENT IN AN ORGANIZED HEALTH CARE EDUCATION/TRAINING PROGRAM

## 2025-01-28 PROCEDURE — 3600000004 HC OR TIME - INITIAL BASE CHARGE - PROCEDURE LEVEL FOUR: Performed by: OTOLARYNGOLOGY

## 2025-01-28 PROCEDURE — 3700000001 HC GENERAL ANESTHESIA TIME - INITIAL BASE CHARGE: Performed by: OTOLARYNGOLOGY

## 2025-01-28 PROCEDURE — 3600000009 HC OR TIME - EACH INCREMENTAL 1 MINUTE - PROCEDURE LEVEL FOUR: Performed by: OTOLARYNGOLOGY

## 2025-01-28 PROCEDURE — 15260 FTH/GFT FR N/E/E/L 20 SQCM/<: CPT | Performed by: OTOLARYNGOLOGY

## 2025-01-28 PROCEDURE — 96372 THER/PROPH/DIAG INJ SC/IM: CPT | Performed by: STUDENT IN AN ORGANIZED HEALTH CARE EDUCATION/TRAINING PROGRAM

## 2025-01-28 PROCEDURE — 2500000005 HC RX 250 GENERAL PHARMACY W/O HCPCS: Performed by: OTOLARYNGOLOGY

## 2025-01-28 PROCEDURE — 2500000004 HC RX 250 GENERAL PHARMACY W/ HCPCS (ALT 636 FOR OP/ED): Performed by: ANESTHESIOLOGY

## 2025-01-28 PROCEDURE — 2500000004 HC RX 250 GENERAL PHARMACY W/ HCPCS (ALT 636 FOR OP/ED): Performed by: ANESTHESIOLOGIST ASSISTANT

## 2025-01-28 PROCEDURE — A30465 PR REPAIR NASAL CAVITY STENOSIS: Performed by: ANESTHESIOLOGIST ASSISTANT

## 2025-01-28 PROCEDURE — 14301 TIS TRNFR ANY 30.1-60 SQ CM: CPT | Performed by: OTOLARYNGOLOGY

## 2025-01-28 RX ORDER — SODIUM CHLORIDE, SODIUM LACTATE, POTASSIUM CHLORIDE, CALCIUM CHLORIDE 600; 310; 30; 20 MG/100ML; MG/100ML; MG/100ML; MG/100ML
INJECTION, SOLUTION INTRAVENOUS CONTINUOUS PRN
Status: DISCONTINUED | OUTPATIENT
Start: 2025-01-28 | End: 2025-01-28

## 2025-01-28 RX ORDER — CEPHALEXIN 500 MG/1
500 CAPSULE ORAL 2 TIMES DAILY
Qty: 14 CAPSULE | Refills: 0 | Status: SHIPPED | OUTPATIENT
Start: 2025-01-28 | End: 2025-02-06 | Stop reason: WASHOUT

## 2025-01-28 RX ORDER — DROPERIDOL 2.5 MG/ML
0.62 INJECTION, SOLUTION INTRAMUSCULAR; INTRAVENOUS ONCE AS NEEDED
Status: COMPLETED | OUTPATIENT
Start: 2025-01-28 | End: 2025-01-28

## 2025-01-28 RX ORDER — ALBUTEROL SULFATE 0.83 MG/ML
2.5 SOLUTION RESPIRATORY (INHALATION) ONCE AS NEEDED
Status: DISCONTINUED | OUTPATIENT
Start: 2025-01-28 | End: 2025-01-28 | Stop reason: HOSPADM

## 2025-01-28 RX ORDER — ACETAMINOPHEN 325 MG/1
975 TABLET ORAL EVERY 8 HOURS SCHEDULED
Status: DISCONTINUED | OUTPATIENT
Start: 2025-01-28 | End: 2025-01-29 | Stop reason: HOSPADM

## 2025-01-28 RX ORDER — ACETAMINOPHEN 160 MG/5ML
1000 SOLUTION ORAL EVERY 8 HOURS SCHEDULED
Status: DISCONTINUED | OUTPATIENT
Start: 2025-01-28 | End: 2025-01-29 | Stop reason: HOSPADM

## 2025-01-28 RX ORDER — CALCITRIOL 0.25 UG/1
0.25 CAPSULE ORAL DAILY
Status: DISCONTINUED | OUTPATIENT
Start: 2025-01-28 | End: 2025-01-29 | Stop reason: HOSPADM

## 2025-01-28 RX ORDER — FENTANYL CITRATE 50 UG/ML
12.5 INJECTION, SOLUTION INTRAMUSCULAR; INTRAVENOUS EVERY 5 MIN PRN
Status: DISCONTINUED | OUTPATIENT
Start: 2025-01-28 | End: 2025-01-28 | Stop reason: HOSPADM

## 2025-01-28 RX ORDER — HEPARIN SODIUM 5000 [USP'U]/ML
5000 INJECTION, SOLUTION INTRAVENOUS; SUBCUTANEOUS EVERY 8 HOURS
Status: DISCONTINUED | OUTPATIENT
Start: 2025-01-28 | End: 2025-01-29 | Stop reason: HOSPADM

## 2025-01-28 RX ORDER — FENTANYL CITRATE 50 UG/ML
INJECTION, SOLUTION INTRAMUSCULAR; INTRAVENOUS AS NEEDED
Status: DISCONTINUED | OUTPATIENT
Start: 2025-01-28 | End: 2025-01-28

## 2025-01-28 RX ORDER — METOCLOPRAMIDE HYDROCHLORIDE 5 MG/ML
10 INJECTION INTRAMUSCULAR; INTRAVENOUS ONCE AS NEEDED
Status: DISCONTINUED | OUTPATIENT
Start: 2025-01-28 | End: 2025-01-28 | Stop reason: HOSPADM

## 2025-01-28 RX ORDER — MUPIROCIN 20 MG/G
1 OINTMENT TOPICAL
Qty: 9 G | Refills: 0 | Status: SHIPPED | OUTPATIENT
Start: 2025-01-28 | End: 2025-02-06

## 2025-01-28 RX ORDER — SODIUM CHLORIDE 0.9 G/100ML
INJECTION, SOLUTION IRRIGATION AS NEEDED
Status: DISCONTINUED | OUTPATIENT
Start: 2025-01-28 | End: 2025-01-28 | Stop reason: HOSPADM

## 2025-01-28 RX ORDER — MIDAZOLAM HYDROCHLORIDE 1 MG/ML
INJECTION INTRAMUSCULAR; INTRAVENOUS AS NEEDED
Status: DISCONTINUED | OUTPATIENT
Start: 2025-01-28 | End: 2025-01-28

## 2025-01-28 RX ORDER — PHENYLEPHRINE HCL IN 0.9% NACL 0.4MG/10ML
SYRINGE (ML) INTRAVENOUS AS NEEDED
Status: DISCONTINUED | OUTPATIENT
Start: 2025-01-28 | End: 2025-01-28

## 2025-01-28 RX ORDER — OXYCODONE HCL 5 MG/5 ML
5 SOLUTION, ORAL ORAL EVERY 4 HOURS PRN
Status: DISCONTINUED | OUTPATIENT
Start: 2025-01-28 | End: 2025-01-29 | Stop reason: HOSPADM

## 2025-01-28 RX ORDER — OXYCODONE HYDROCHLORIDE 5 MG/1
10 TABLET ORAL EVERY 4 HOURS PRN
Status: DISCONTINUED | OUTPATIENT
Start: 2025-01-28 | End: 2025-01-28 | Stop reason: HOSPADM

## 2025-01-28 RX ORDER — LIDOCAINE HCL/PF 100 MG/5ML
SYRINGE (ML) INTRAVENOUS AS NEEDED
Status: DISCONTINUED | OUTPATIENT
Start: 2025-01-28 | End: 2025-01-28

## 2025-01-28 RX ORDER — OXYCODONE HYDROCHLORIDE 5 MG/1
5 TABLET ORAL EVERY 6 HOURS PRN
Qty: 28 TABLET | Refills: 0 | Status: SHIPPED | OUTPATIENT
Start: 2025-01-28 | End: 2025-02-06 | Stop reason: WASHOUT

## 2025-01-28 RX ORDER — ATORVASTATIN CALCIUM 10 MG/1
10 TABLET, FILM COATED ORAL NIGHTLY
Status: DISCONTINUED | OUTPATIENT
Start: 2025-01-28 | End: 2025-01-29 | Stop reason: HOSPADM

## 2025-01-28 RX ORDER — HYDROMORPHONE HYDROCHLORIDE 1 MG/ML
INJECTION, SOLUTION INTRAMUSCULAR; INTRAVENOUS; SUBCUTANEOUS AS NEEDED
Status: DISCONTINUED | OUTPATIENT
Start: 2025-01-28 | End: 2025-01-28

## 2025-01-28 RX ORDER — SODIUM CHLORIDE 9 MG/ML
INJECTION INTRAMUSCULAR; INTRAVENOUS; SUBCUTANEOUS AS NEEDED
Status: DISCONTINUED | OUTPATIENT
Start: 2025-01-28 | End: 2025-01-28 | Stop reason: HOSPADM

## 2025-01-28 RX ORDER — OXYCODONE AND ACETAMINOPHEN 5; 325 MG/1; MG/1
1 TABLET ORAL EVERY 4 HOURS PRN
Status: DISCONTINUED | OUTPATIENT
Start: 2025-01-28 | End: 2025-01-28 | Stop reason: HOSPADM

## 2025-01-28 RX ORDER — ONDANSETRON HYDROCHLORIDE 2 MG/ML
4 INJECTION, SOLUTION INTRAVENOUS EVERY 8 HOURS PRN
Status: DISCONTINUED | OUTPATIENT
Start: 2025-01-28 | End: 2025-01-29 | Stop reason: HOSPADM

## 2025-01-28 RX ORDER — PROPOFOL 10 MG/ML
INJECTION, EMULSION INTRAVENOUS AS NEEDED
Status: DISCONTINUED | OUTPATIENT
Start: 2025-01-28 | End: 2025-01-28

## 2025-01-28 RX ORDER — HYDROGEN PEROXIDE 3 %
1 SOLUTION, NON-ORAL MISCELLANEOUS 3 TIMES DAILY
Status: DISCONTINUED | OUTPATIENT
Start: 2025-01-28 | End: 2025-01-29 | Stop reason: HOSPADM

## 2025-01-28 RX ORDER — ROCURONIUM BROMIDE 10 MG/ML
INJECTION, SOLUTION INTRAVENOUS AS NEEDED
Status: DISCONTINUED | OUTPATIENT
Start: 2025-01-28 | End: 2025-01-28

## 2025-01-28 RX ORDER — ONDANSETRON 4 MG/1
4 TABLET, ORALLY DISINTEGRATING ORAL EVERY 8 HOURS PRN
Qty: 9 TABLET | Refills: 0 | Status: SHIPPED | OUTPATIENT
Start: 2025-01-28 | End: 2025-01-31

## 2025-01-28 RX ORDER — DEXMEDETOMIDINE IN 0.9 % NACL 20 MCG/5ML
SYRINGE (ML) INTRAVENOUS AS NEEDED
Status: DISCONTINUED | OUTPATIENT
Start: 2025-01-28 | End: 2025-01-28

## 2025-01-28 RX ORDER — HYDROMORPHONE HYDROCHLORIDE 0.2 MG/ML
0.2 INJECTION INTRAMUSCULAR; INTRAVENOUS; SUBCUTANEOUS EVERY 5 MIN PRN
Status: DISCONTINUED | OUTPATIENT
Start: 2025-01-28 | End: 2025-01-28 | Stop reason: HOSPADM

## 2025-01-28 RX ORDER — LABETALOL HYDROCHLORIDE 5 MG/ML
5 INJECTION, SOLUTION INTRAVENOUS EVERY 10 MIN PRN
Status: DISCONTINUED | OUTPATIENT
Start: 2025-01-28 | End: 2025-01-28 | Stop reason: HOSPADM

## 2025-01-28 RX ORDER — FAMOTIDINE 40 MG/1
40 TABLET, FILM COATED ORAL DAILY
Status: DISCONTINUED | OUTPATIENT
Start: 2025-01-28 | End: 2025-01-29 | Stop reason: HOSPADM

## 2025-01-28 RX ORDER — SODIUM CHLORIDE, SODIUM LACTATE, POTASSIUM CHLORIDE, CALCIUM CHLORIDE 600; 310; 30; 20 MG/100ML; MG/100ML; MG/100ML; MG/100ML
5 INJECTION, SOLUTION INTRAVENOUS CONTINUOUS
Status: DISCONTINUED | OUTPATIENT
Start: 2025-01-28 | End: 2025-01-28 | Stop reason: HOSPADM

## 2025-01-28 RX ORDER — POLYETHYLENE GLYCOL 3350 17 G/17G
17 POWDER, FOR SOLUTION ORAL DAILY
Status: DISCONTINUED | OUTPATIENT
Start: 2025-01-28 | End: 2025-01-29 | Stop reason: HOSPADM

## 2025-01-28 RX ORDER — HYDRALAZINE HYDROCHLORIDE 20 MG/ML
5 INJECTION INTRAMUSCULAR; INTRAVENOUS EVERY 10 MIN PRN
Status: DISCONTINUED | OUTPATIENT
Start: 2025-01-28 | End: 2025-01-28 | Stop reason: HOSPADM

## 2025-01-28 RX ORDER — ASPIRIN 81 MG/1
81 TABLET ORAL DAILY
Status: DISCONTINUED | OUTPATIENT
Start: 2025-01-29 | End: 2025-01-29 | Stop reason: HOSPADM

## 2025-01-28 RX ORDER — GLYCOPYRROLATE 0.2 MG/ML
INJECTION INTRAMUSCULAR; INTRAVENOUS AS NEEDED
Status: DISCONTINUED | OUTPATIENT
Start: 2025-01-28 | End: 2025-01-28

## 2025-01-28 RX ORDER — SUCCINYLCHOLINE CHLORIDE 20 MG/ML
INJECTION INTRAMUSCULAR; INTRAVENOUS AS NEEDED
Status: DISCONTINUED | OUTPATIENT
Start: 2025-01-28 | End: 2025-01-28

## 2025-01-28 RX ORDER — LIDOCAINE HYDROCHLORIDE AND EPINEPHRINE 10; 10 UG/ML; MG/ML
INJECTION, SOLUTION INFILTRATION; PERINEURAL AS NEEDED
Status: DISCONTINUED | OUTPATIENT
Start: 2025-01-28 | End: 2025-01-28 | Stop reason: HOSPADM

## 2025-01-28 RX ORDER — LIDOCAINE HYDROCHLORIDE 10 MG/ML
0.1 INJECTION, SOLUTION INFILTRATION; PERINEURAL ONCE
Status: DISCONTINUED | OUTPATIENT
Start: 2025-01-28 | End: 2025-01-28 | Stop reason: HOSPADM

## 2025-01-28 RX ORDER — MUPIROCIN 20 MG/G
OINTMENT TOPICAL 3 TIMES DAILY
Status: DISCONTINUED | OUTPATIENT
Start: 2025-01-28 | End: 2025-01-29 | Stop reason: HOSPADM

## 2025-01-28 RX ORDER — ACETAMINOPHEN 325 MG/1
650 TABLET ORAL EVERY 4 HOURS PRN
Status: DISCONTINUED | OUTPATIENT
Start: 2025-01-28 | End: 2025-01-28 | Stop reason: HOSPADM

## 2025-01-28 RX ORDER — OXYCODONE HYDROCHLORIDE 5 MG/1
5 TABLET ORAL EVERY 4 HOURS PRN
Status: DISCONTINUED | OUTPATIENT
Start: 2025-01-28 | End: 2025-01-29 | Stop reason: HOSPADM

## 2025-01-28 RX ORDER — OXYCODONE HYDROCHLORIDE 5 MG/1
10 TABLET ORAL EVERY 4 HOURS PRN
Status: DISCONTINUED | OUTPATIENT
Start: 2025-01-28 | End: 2025-01-29 | Stop reason: HOSPADM

## 2025-01-28 RX ORDER — NALOXONE HYDROCHLORIDE 0.4 MG/ML
0.2 INJECTION, SOLUTION INTRAMUSCULAR; INTRAVENOUS; SUBCUTANEOUS EVERY 5 MIN PRN
Status: DISCONTINUED | OUTPATIENT
Start: 2025-01-28 | End: 2025-01-29 | Stop reason: HOSPADM

## 2025-01-28 RX ORDER — CEPHALEXIN 500 MG/1
500 CAPSULE ORAL EVERY 6 HOURS SCHEDULED
Status: DISCONTINUED | OUTPATIENT
Start: 2025-01-28 | End: 2025-01-29 | Stop reason: HOSPADM

## 2025-01-28 RX ORDER — CEFAZOLIN 1 G/1
INJECTION, POWDER, FOR SOLUTION INTRAVENOUS AS NEEDED
Status: DISCONTINUED | OUTPATIENT
Start: 2025-01-28 | End: 2025-01-28

## 2025-01-28 RX ORDER — ONDANSETRON HYDROCHLORIDE 2 MG/ML
INJECTION, SOLUTION INTRAVENOUS AS NEEDED
Status: DISCONTINUED | OUTPATIENT
Start: 2025-01-28 | End: 2025-01-28

## 2025-01-28 RX ORDER — TRANEXAMIC ACID 100 MG/ML
INJECTION, SOLUTION INTRAVENOUS AS NEEDED
Status: DISCONTINUED | OUTPATIENT
Start: 2025-01-28 | End: 2025-01-28 | Stop reason: HOSPADM

## 2025-01-28 RX ORDER — CEPHALEXIN 500 MG/1
500 CAPSULE ORAL EVERY 6 HOURS SCHEDULED
Status: DISCONTINUED | OUTPATIENT
Start: 2025-01-28 | End: 2025-01-28

## 2025-01-28 RX ORDER — APREPITANT 40 MG/1
80 CAPSULE ORAL ONCE
Status: COMPLETED | OUTPATIENT
Start: 2025-01-28 | End: 2025-01-28

## 2025-01-28 RX ORDER — ONDANSETRON 4 MG/1
4 TABLET, ORALLY DISINTEGRATING ORAL EVERY 8 HOURS PRN
Status: DISCONTINUED | OUTPATIENT
Start: 2025-01-28 | End: 2025-01-29 | Stop reason: HOSPADM

## 2025-01-28 RX ADMIN — FENTANYL CITRATE 50 MCG: 50 INJECTION, SOLUTION INTRAMUSCULAR; INTRAVENOUS at 07:33

## 2025-01-28 RX ADMIN — APREPITANT 80 MG: 40 CAPSULE ORAL at 07:02

## 2025-01-28 RX ADMIN — CEPHALEXIN 500 MG: 500 CAPSULE ORAL at 19:10

## 2025-01-28 RX ADMIN — FENTANYL CITRATE 50 MCG: 50 INJECTION, SOLUTION INTRAMUSCULAR; INTRAVENOUS at 07:26

## 2025-01-28 RX ADMIN — MIDAZOLAM HYDROCHLORIDE 1 MG: 1 INJECTION, SOLUTION INTRAMUSCULAR; INTRAVENOUS at 07:33

## 2025-01-28 RX ADMIN — SODIUM CHLORIDE, POTASSIUM CHLORIDE, SODIUM LACTATE AND CALCIUM CHLORIDE: 600; 310; 30; 20 INJECTION, SOLUTION INTRAVENOUS at 07:26

## 2025-01-28 RX ADMIN — ACETAMINOPHEN 650 MG: 325 TABLET ORAL at 15:20

## 2025-01-28 RX ADMIN — Medication 100 MCG: at 10:14

## 2025-01-28 RX ADMIN — CALCITRIOL CAPSULES 0.25 MCG 0.25 MCG: 0.25 CAPSULE ORAL at 18:59

## 2025-01-28 RX ADMIN — PROPOFOL 50 MG: 10 INJECTION, EMULSION INTRAVENOUS at 10:38

## 2025-01-28 RX ADMIN — ATORVASTATIN CALCIUM 10 MG: 10 TABLET, FILM COATED ORAL at 21:40

## 2025-01-28 RX ADMIN — SUGAMMADEX 200 MG: 100 INJECTION, SOLUTION INTRAVENOUS at 10:42

## 2025-01-28 RX ADMIN — HYDROGEN PEROXIDE 1 APPLICATION: 30 SOLUTION TOPICAL at 21:40

## 2025-01-28 RX ADMIN — ROCURONIUM 20 MG: 50 INJECTION, SOLUTION INTRAVENOUS at 09:19

## 2025-01-28 RX ADMIN — CEFAZOLIN 2 G: 1 INJECTION, POWDER, FOR SOLUTION INTRAMUSCULAR; INTRAVENOUS at 07:45

## 2025-01-28 RX ADMIN — PROPOFOL 150 MG: 10 INJECTION, EMULSION INTRAVENOUS at 07:41

## 2025-01-28 RX ADMIN — DROPERIDOL 0.62 MG: 2.5 INJECTION, SOLUTION INTRAMUSCULAR; INTRAVENOUS at 12:28

## 2025-01-28 RX ADMIN — ONDANSETRON 4 MG: 2 INJECTION, SOLUTION INTRAMUSCULAR; INTRAVENOUS at 09:47

## 2025-01-28 RX ADMIN — FAMOTIDINE 40 MG: 40 TABLET ORAL at 21:40

## 2025-01-28 RX ADMIN — LIDOCAINE HYDROCHLORIDE 100 MG: 20 INJECTION INTRAVENOUS at 07:40

## 2025-01-28 RX ADMIN — Medication 8 MCG: at 09:47

## 2025-01-28 RX ADMIN — ROCURONIUM 10 MG: 50 INJECTION, SOLUTION INTRAVENOUS at 10:03

## 2025-01-28 RX ADMIN — Medication 200 MCG: at 08:08

## 2025-01-28 RX ADMIN — ROCURONIUM 70 MG: 50 INJECTION, SOLUTION INTRAVENOUS at 07:54

## 2025-01-28 RX ADMIN — MIDAZOLAM HYDROCHLORIDE 1 MG: 1 INJECTION, SOLUTION INTRAMUSCULAR; INTRAVENOUS at 07:26

## 2025-01-28 RX ADMIN — SUCCINYLCHOLINE CHLORIDE 200 MG: 20 INJECTION, SOLUTION INTRAMUSCULAR; INTRAVENOUS at 07:42

## 2025-01-28 RX ADMIN — Medication 100 MCG: at 08:34

## 2025-01-28 RX ADMIN — HEPARIN SODIUM 5000 UNITS: 5000 INJECTION, SOLUTION INTRAVENOUS; SUBCUTANEOUS at 18:20

## 2025-01-28 RX ADMIN — MUPIROCIN: 20 OINTMENT TOPICAL at 21:40

## 2025-01-28 RX ADMIN — ACETAMINOPHEN 975 MG: 325 TABLET ORAL at 18:19

## 2025-01-28 RX ADMIN — GLYCOPYRROLATE 0.3 MG: 0.2 INJECTION INTRAMUSCULAR; INTRAVENOUS at 07:26

## 2025-01-28 RX ADMIN — MUPIROCIN: 20 OINTMENT TOPICAL at 18:59

## 2025-01-28 RX ADMIN — Medication 12 MCG: at 07:26

## 2025-01-28 RX ADMIN — ROCURONIUM 5 MG: 50 INJECTION, SOLUTION INTRAVENOUS at 07:40

## 2025-01-28 RX ADMIN — HYDROGEN PEROXIDE 1 APPLICATION: 30 SOLUTION TOPICAL at 18:19

## 2025-01-28 RX ADMIN — HYDROMORPHONE HYDROCHLORIDE 1 MG: 1 INJECTION, SOLUTION INTRAMUSCULAR; INTRAVENOUS; SUBCUTANEOUS at 10:04

## 2025-01-28 RX ADMIN — DEXAMETHASONE SODIUM PHOSPHATE 10 MG: 4 INJECTION INTRA-ARTICULAR; INTRALESIONAL; INTRAMUSCULAR; INTRAVENOUS; SOFT TISSUE at 07:44

## 2025-01-28 SDOH — SOCIAL STABILITY: SOCIAL INSECURITY
WITHIN THE LAST YEAR, HAVE YOU BEEN RAPED OR FORCED TO HAVE ANY KIND OF SEXUAL ACTIVITY BY YOUR PARTNER OR EX-PARTNER?: NO

## 2025-01-28 SDOH — SOCIAL STABILITY: SOCIAL INSECURITY: HAVE YOU HAD ANY THOUGHTS OF HARMING ANYONE ELSE?: NO

## 2025-01-28 SDOH — HEALTH STABILITY: MENTAL HEALTH: HOW OFTEN DO YOU HAVE SIX OR MORE DRINKS ON ONE OCCASION?: NEVER

## 2025-01-28 SDOH — SOCIAL STABILITY: SOCIAL INSECURITY
WITHIN THE LAST YEAR, HAVE YOU BEEN KICKED, HIT, SLAPPED, OR OTHERWISE PHYSICALLY HURT BY YOUR PARTNER OR EX-PARTNER?: NO

## 2025-01-28 SDOH — SOCIAL STABILITY: SOCIAL INSECURITY: WITHIN THE LAST YEAR, HAVE YOU BEEN HUMILIATED OR EMOTIONALLY ABUSED IN OTHER WAYS BY YOUR PARTNER OR EX-PARTNER?: NO

## 2025-01-28 SDOH — ECONOMIC STABILITY: INCOME INSECURITY: IN THE PAST 12 MONTHS HAS THE ELECTRIC, GAS, OIL, OR WATER COMPANY THREATENED TO SHUT OFF SERVICES IN YOUR HOME?: NO

## 2025-01-28 SDOH — ECONOMIC STABILITY: TRANSPORTATION INSECURITY: IN THE PAST 12 MONTHS, HAS LACK OF TRANSPORTATION KEPT YOU FROM MEDICAL APPOINTMENTS OR FROM GETTING MEDICATIONS?: NO

## 2025-01-28 SDOH — SOCIAL STABILITY: SOCIAL INSECURITY: WITHIN THE LAST YEAR, HAVE YOU BEEN AFRAID OF YOUR PARTNER OR EX-PARTNER?: NO

## 2025-01-28 SDOH — ECONOMIC STABILITY: FOOD INSECURITY: WITHIN THE PAST 12 MONTHS, THE FOOD YOU BOUGHT JUST DIDN'T LAST AND YOU DIDN'T HAVE MONEY TO GET MORE.: NEVER TRUE

## 2025-01-28 SDOH — SOCIAL STABILITY: SOCIAL INSECURITY: DO YOU FEEL ANYONE HAS EXPLOITED OR TAKEN ADVANTAGE OF YOU FINANCIALLY OR OF YOUR PERSONAL PROPERTY?: NO

## 2025-01-28 SDOH — ECONOMIC STABILITY: FOOD INSECURITY: WITHIN THE PAST 12 MONTHS, YOU WORRIED THAT YOUR FOOD WOULD RUN OUT BEFORE YOU GOT THE MONEY TO BUY MORE.: NEVER TRUE

## 2025-01-28 SDOH — SOCIAL STABILITY: SOCIAL INSECURITY: DO YOU FEEL UNSAFE GOING BACK TO THE PLACE WHERE YOU ARE LIVING?: NO

## 2025-01-28 SDOH — SOCIAL STABILITY: SOCIAL INSECURITY: HAS ANYONE EVER THREATENED TO HURT YOUR FAMILY OR YOUR PETS?: NO

## 2025-01-28 SDOH — ECONOMIC STABILITY: HOUSING INSECURITY: IN THE LAST 12 MONTHS, WAS THERE A TIME WHEN YOU WERE NOT ABLE TO PAY THE MORTGAGE OR RENT ON TIME?: NO

## 2025-01-28 SDOH — HEALTH STABILITY: MENTAL HEALTH: HOW OFTEN DO YOU HAVE A DRINK CONTAINING ALCOHOL?: MONTHLY OR LESS

## 2025-01-28 SDOH — ECONOMIC STABILITY: FOOD INSECURITY: HOW HARD IS IT FOR YOU TO PAY FOR THE VERY BASICS LIKE FOOD, HOUSING, MEDICAL CARE, AND HEATING?: NOT HARD AT ALL

## 2025-01-28 SDOH — HEALTH STABILITY: MENTAL HEALTH: CURRENT SMOKER: 0

## 2025-01-28 SDOH — SOCIAL STABILITY: SOCIAL INSECURITY: ABUSE: ADULT

## 2025-01-28 SDOH — SOCIAL STABILITY: SOCIAL INSECURITY: WERE YOU ABLE TO COMPLETE ALL THE BEHAVIORAL HEALTH SCREENINGS?: YES

## 2025-01-28 SDOH — SOCIAL STABILITY: SOCIAL INSECURITY: HAVE YOU HAD THOUGHTS OF HARMING ANYONE ELSE?: NO

## 2025-01-28 SDOH — HEALTH STABILITY: MENTAL HEALTH: HOW MANY DRINKS CONTAINING ALCOHOL DO YOU HAVE ON A TYPICAL DAY WHEN YOU ARE DRINKING?: 1 OR 2

## 2025-01-28 SDOH — SOCIAL STABILITY: SOCIAL INSECURITY: ARE THERE ANY APPARENT SIGNS OF INJURIES/BEHAVIORS THAT COULD BE RELATED TO ABUSE/NEGLECT?: NO

## 2025-01-28 SDOH — ECONOMIC STABILITY: HOUSING INSECURITY: AT ANY TIME IN THE PAST 12 MONTHS, WERE YOU HOMELESS OR LIVING IN A SHELTER (INCLUDING NOW)?: NO

## 2025-01-28 SDOH — ECONOMIC STABILITY: HOUSING INSECURITY: IN THE PAST 12 MONTHS, HOW MANY TIMES HAVE YOU MOVED WHERE YOU WERE LIVING?: 0

## 2025-01-28 SDOH — SOCIAL STABILITY: SOCIAL INSECURITY: DOES ANYONE TRY TO KEEP YOU FROM HAVING/CONTACTING OTHER FRIENDS OR DOING THINGS OUTSIDE YOUR HOME?: NO

## 2025-01-28 SDOH — SOCIAL STABILITY: SOCIAL INSECURITY: ARE YOU OR HAVE YOU BEEN THREATENED OR ABUSED PHYSICALLY, EMOTIONALLY, OR SEXUALLY BY ANYONE?: NO

## 2025-01-28 ASSESSMENT — PAIN - FUNCTIONAL ASSESSMENT
PAIN_FUNCTIONAL_ASSESSMENT: 0-10
PAIN_FUNCTIONAL_ASSESSMENT: UNABLE TO SELF-REPORT

## 2025-01-28 ASSESSMENT — LIFESTYLE VARIABLES
AUDIT-C TOTAL SCORE: 0
HOW OFTEN DO YOU HAVE A DRINK CONTAINING ALCOHOL: NEVER
SKIP TO QUESTIONS 9-10: 1
AUDIT-C TOTAL SCORE: 1
PRESCIPTION_ABUSE_PAST_12_MONTHS: NO
SKIP TO QUESTIONS 9-10: 1
AUDIT-C TOTAL SCORE: 0
HOW OFTEN DO YOU HAVE 6 OR MORE DRINKS ON ONE OCCASION: NEVER
HOW MANY STANDARD DRINKS CONTAINING ALCOHOL DO YOU HAVE ON A TYPICAL DAY: PATIENT DOES NOT DRINK
SUBSTANCE_ABUSE_PAST_12_MONTHS: NO

## 2025-01-28 ASSESSMENT — COGNITIVE AND FUNCTIONAL STATUS - GENERAL
DAILY ACTIVITIY SCORE: 18
MOVING FROM LYING ON BACK TO SITTING ON SIDE OF FLAT BED WITH BEDRAILS: A LITTLE
TOILETING: A LITTLE
MOBILITY SCORE: 24
MOBILITY SCORE: 18
DRESSING REGULAR LOWER BODY CLOTHING: A LITTLE
STANDING UP FROM CHAIR USING ARMS: A LITTLE
TURNING FROM BACK TO SIDE WHILE IN FLAT BAD: A LITTLE
DRESSING REGULAR UPPER BODY CLOTHING: A LITTLE
EATING MEALS: A LITTLE
DAILY ACTIVITIY SCORE: 24
WALKING IN HOSPITAL ROOM: A LITTLE
CLIMB 3 TO 5 STEPS WITH RAILING: A LITTLE
MOVING TO AND FROM BED TO CHAIR: A LITTLE
PERSONAL GROOMING: A LITTLE
HELP NEEDED FOR BATHING: A LITTLE
PATIENT BASELINE BEDBOUND: NO

## 2025-01-28 ASSESSMENT — ACTIVITIES OF DAILY LIVING (ADL)
PATIENT'S MEMORY ADEQUATE TO SAFELY COMPLETE DAILY ACTIVITIES?: YES
WALKS IN HOME: INDEPENDENT
DRESSING YOURSELF: INDEPENDENT
FEEDING YOURSELF: INDEPENDENT
JUDGMENT_ADEQUATE_SAFELY_COMPLETE_DAILY_ACTIVITIES: YES
GROOMING: INDEPENDENT
HEARING - LEFT EAR: FUNCTIONAL
BATHING: INDEPENDENT
ADEQUATE_TO_COMPLETE_ADL: YES
TOILETING: INDEPENDENT
HEARING - RIGHT EAR: FUNCTIONAL
LACK_OF_TRANSPORTATION: NO
LACK_OF_TRANSPORTATION: NO

## 2025-01-28 ASSESSMENT — PATIENT HEALTH QUESTIONNAIRE - PHQ9
2. FEELING DOWN, DEPRESSED OR HOPELESS: NOT AT ALL
1. LITTLE INTEREST OR PLEASURE IN DOING THINGS: NOT AT ALL
SUM OF ALL RESPONSES TO PHQ9 QUESTIONS 1 & 2: 0

## 2025-01-28 ASSESSMENT — PAIN SCALES - GENERAL
PAINLEVEL_OUTOF10: 0 - NO PAIN
PAINLEVEL_OUTOF10: 0 - NO PAIN
PAINLEVEL_OUTOF10: 3
PAINLEVEL_OUTOF10: 0 - NO PAIN
PAINLEVEL_OUTOF10: 2
PAINLEVEL_OUTOF10: 0 - NO PAIN
PAINLEVEL_OUTOF10: 1
PAINLEVEL_OUTOF10: 0 - NO PAIN

## 2025-01-28 ASSESSMENT — PAIN DESCRIPTION - LOCATION: LOCATION: HEAD

## 2025-01-28 NOTE — INTERVAL H&P NOTE
History reviewed. The patient was examined and there are no changes to the history.    Physical exam  Gen- NAD  Resp- nonlabored on RA, symmetric chest rise  CV- well perfused  Head/Face- NCAT, no masses or lesions  Eyes- EOMI, clear sclera  Ears- normal external ears, no gross lesions of EACs  Nose- anterior nares clear, no bleeding or drainage, left nasal defect  Mouth- lips without lesions, no excessive drooling  Neuro- alert and interactive

## 2025-01-28 NOTE — ANESTHESIA PROCEDURE NOTES
Airway  Date/Time: 1/28/2025 7:43 AM  Urgency: elective    Airway not difficult    Staffing  Performed: CAA   Authorized by: Arsenio Lund MD    Performed by: ESPERANZA Ferguson  Patient location during procedure: OR    Indications and Patient Condition  Indications for airway management: anesthesia  Spontaneous Ventilation: absent  Sedation level: deep  Preoxygenated: yes  Patient position: sniffing  Mask difficulty assessment: 0 - not attempted    Final Airway Details  Final airway type: endotracheal airway      Successful airway: ETT  Cuffed: yes   Successful intubation technique: direct laryngoscopy  Endotracheal tube insertion site: oral  Blade: Chris  Blade size: #4  ETT size (mm): 6.5  Cormack-Lehane Classification: grade I - full view of glottis  Placement verified by: chest auscultation   Measured from: lips  ETT to lips (cm): 22  Number of attempts at approach: 1  Ventilation between attempts: BVM    Additional Comments  Easy RSI by CAA, no stylet used. ETT taped midline per surgeon request

## 2025-01-28 NOTE — DISCHARGE INSTRUCTIONS
NASAL SURGERY  Important Phone Numbers  Dr. Duke Shipman: [phone]  Shell Copeland R.N.  Evenings/Weekends Emergency: [phone]  - please ask for the ENT resident on-call    At Home after Surgery:    Dressing: The dressing will stay in place until your follow up appointment. Keep the yellow dressing moist with mupirocin ointment.     Incision/Cast Care: If you have an incision on the nose, apply antibiotic ointment four times a day.  The incision will heal most optimally if it is kept moist and clean.  You can use hydrogen peroxide on Q-tips to gently clean any crusts.  Do not rub but gently dab the incision to clean.     Shower: You may shower 24 hours after surgery.  Do not let the shower spray hit your face/nose directly and do not soak your face in water.  If you have a cast or dressing on the outside of the nose, try to keep it as dry as possible.  Towel blot your nose/cast after your shower.    Bleeding: Most patients have mild, active bleeding the first night.  Some blood-tinged drainage is normal for 1-2 weeks after surgery.  Afrin nasal spray may be helpful for bleeding after surgery but should not be used for more than 3 days.  Excessive bleeding that does not stop is not expected; please call the office or seek medical attention if this occurs.    Medications: Take the medications as prescribed.  You can take Tylenol in addition to the narcotic pain medication prescribed.  Resume all home medications the night of surgery unless otherwise directed.      Activity: Resume normal activities of daily living, as you feel able.  However, avoid strenuous activity and heavy lifting (more than 10 lbs) for 3 weeks after surgery.  Light activity such as walking may be resumed after 1 week after surgery.  Sport activities may be resumed 1 month after surgery but try to protect your nose as it is still healing.    Seek Medical Attention: Call the office or seek medical attention if you develop fever greater  than 101 degrees, excessive bleeding, excessive pain that is not well-controlled, skin rash, visual disturbances, or other unusual symptoms.

## 2025-01-28 NOTE — CARE PLAN
The patient's goals for the shift include pt will remain stable    The clinical goals for the shift include pt will have good pain control    Problem: Pain  Goal: Turns in bed with improved pain control throughout the shift  Outcome: Progressing     Problem: Pain  Goal: Free from opioid side effects throughout the shift  Outcome: Progressing     Problem: Skin  Goal: Decreased wound size/increased tissue granulation at next dressing change  Outcome: Progressing

## 2025-01-28 NOTE — OP NOTE
FOREHEAD FLAP, NASAL RECONSTRUCTION, CARTILAGE/SKIN GRAFT (B) Operative Note     Date: 2025  OR Location: [facility] OR    Name: Kiley Ward, : 1955, Age: 69 y.o., MRN: [id number], Sex: female    Diagnosis  Pre-op Diagnosis      * Melanoma of nose (Multi) [C43.31] Post-op Diagnosis     * Melanoma of nose (Multi) [C43.31]     Procedures  FOREHEAD FLAP, NASAL RECONSTRUCTION, CARTILAGE/SKIN GRAFT    AR FOREHEAD FLAP W/PRESERVATION VASCULAR PEDICLE [15336]  AR ADJNT TIS TRNSFR/REARGMT ANY AREA 30.1-60 SQ CM [10408]  AR REPAIR NASAL VESTIBULAR STENOSIS [52139]  AR PREP SITE F/S/N/H/F/G/M/D GT 1ST 100 SQ CM/1PCT [07333]  AR GRAFT EAR CRTLG AUTOGENOUS NOSE/EAR [08257]  AR FULL THICKNESS SKIN GRAFT TO FOREHEAD FLAP 2.5 X 5 CM  [18197]    Surgeons      * Duke Shipman - Primary    Resident/Fellow/Other Assistant:  Surgeons and Role:     * Wendy Eckert MD - Resident - Assisting    Staff:   Circulator: Jaquelin Winston Person: Dov Winston Person: Mimi    Anesthesia Staff: Anesthesiologist: Arsenio Lund MD  C-AA: ESPERANZA Ferguson    Procedure Summary  Anesthesia: General  ASA: III  Estimated Blood Loss: 20 mL  Intra-op Medications:   Administrations occurring from 0645 to 1045 on 25:   Medication Name Total Dose   tranexamic acid (Cyklokapron) injection 1 g   lidocaine-epinephrine (Xylocaine W/EPI) 1 %-1:100,000 injection 30 mL   sodium chloride 0.9 % irrigation solution 1,000 mL   balanced salts (BSS Plus) intraocular solution 15 mL   sodium chloride bacteriostatic 0.9 % injection 20 mL   aprepitant (Emend) capsule 80 mg 80 mg   ceFAZolin (Ancef) vial 1 g 2 g   dexAMETHasone (Decadron) injection 4 mg/mL 10 mg   dexMEDETOMidine 4 mcg/mL in NS syringe 20 mcg   fentaNYL (Sublimaze) injection 50 mcg/mL 100 mcg   glycopyrrolate (Robinul) injection 0.3 mg   HYDROmorphone (Dilaudid) injection 1 mg/mL 1 mg   lactated Ringer's infusion Cannot be calculated   lidocaine (cardiac) injection 2% prefilled  syringe 100 mg   midazolam PF (Versed) injection 1 mg/mL 2 mg   ondansetron (Zofran) 2 mg/mL injection 4 mg   phenylephrine 40 mcg/mL syringe 10 mL 400 mcg   propofol (Diprivan) injection 10 mg/mL 200 mg   rocuronium (ZeMuron) 50 mg/5 mL injection 105 mg   succinylcholine (Anectine) 20 mg/mL injection 200 mg   sugammadex (Bridion) 200 mg/2 mL injection 200 mg              Anesthesia Record               Intraprocedure I/O Totals          Intake    lactated Ringer's 900.00 mL    Total Intake 900 mL       Output    Est. Blood Loss 25 mL    Total Output 25 mL       Net    Net Volume 875 mL          Specimen:   ID Type Source Tests Collected by Time   1 : CENTRAL FOREHEAD LESION Tissue SKIN EXCISION SURGICAL PATHOLOGY EXAM Duke Shipman MD 1/28/2025 0843                 Drains and/or Catheters: * None in log *    Tourniquet Times:         Implants:     Findings: 2 cm defect of left nasal tip/ala, reconstructed with right paramedian forehead flap. Full thickness skin graft from right lateral arm to posterior forehead flap. Primary forehead defect closed primarily.    Indications: Kiley Ward is an 69 y.o. female who is having surgery for Melanoma of nose (Multi) [C43.31]. Patient underwent Mohs resection and now presents for reconstruction.     The patient was seen in the preoperative area. The risks, benefits, complications, treatment options, non-operative alternatives, expected recovery and outcomes were discussed with the patient. The possibilities of reaction to medication, pulmonary aspiration, injury to surrounding structures, bleeding, recurrent infection, the need for additional procedures, failure to diagnose a condition, and creating a complication requiring transfusion or operation were discussed with the patient. The patient concurred with the proposed plan, giving informed consent.  The site of surgery was properly noted/marked if necessary per policy. The patient has been actively warmed in  preoperative area. Preoperative antibiotics have been ordered and given within 1 hours of incision. Venous thrombosis prophylaxis have been ordered including bilateral sequential compression devices    Procedure Details:   Patient has a history of nasal cancer with resulting defect of the nose involving the left tip and ala. I discussed with the patient, in detail, the complex nature of facial reconstruction.  The patient was informed of the detailed risks, benefits, limitations, and alternatives of surgery.  Risks discussed included but were not limited to scarring, disfigurement that is permanent, bleeding, infection, and the need for further surgery.  The patient expressed understanding of these issues and remained eager to proceed.     The patient was brought to the operating room and placed in the supine position, then intubated under general anesthesia.  The defect was inspected and facial markings performed for the repair.  No local tissue rearrangement option was available and therefore, a right paramedian forehead flap was designed.  The location of the supratrochlear artery was marked out, approximately 2cm lateral from the midline. The forehead and nose was then injected with 1% lidocaine with epinephrine.  The face was then prepped and draped in the usual sterile fashion.     The edges of the nasal defect were sharply excised with the use of a 15 blade scalpel, taking care to observe the nasal subunit principles and to keep the edges angular to avoid a trapdoor deformity.  Appropriate undermining was performed.  The total defect involved the left nasal tip and ala, with less than 50% of each subunit involved.  An exact template of the defect was created and translated onto the right forehead, where a flap was designed maintaining a pedicle width of approximately 1.5cm.     Prior to sterile prepping and draping, the right ear had been identified, marked, and injected with local anesthetic with  epinephrine.  A curvilinear postauricular incision was created just posterior to the conchal eminence and sharp dissection was carried down onto the conchal cartilage.  The skin-soft tissue envelope was elevated off of the conchal cartilage, leaving the posterior perichondrium in place.  Following wide exposure, the cartilage was sharply incised, taking care to preserve an adequate amount of cartilage along the anti-helical fold.  Subperichondrial dissection was performed on the anterior aspect of the geovani cavum and geovani cymba.  The conchal cartilage was then freed from its attachment medially, released, and placed in saline for later use.  The wound was irrigated and meticulous hemostasis was achieved using bipolar electrocautery. 4-0 chromic gut was used to close the skin in a running fashion. The ear was bolstered with chromic gut quilting sutures.    To optimize the size, shape and resiliency of left nasal external valve and ala, an auricular non-anatomic alar replacement cartilage graft was placed within a pocket in the left ala to prevent alar rim retraction and treat the external nasal valve. This was secured in place using 4-0 PDS suture.     Incisions were made with the scalpel and dissection was carried down to the subcutaneous plane and subsequently the galea.  The flap was elevated in a subcutaneous plane, then transitioned to a subgaleal plane. After obtaining adequate mobilization, the flap was then rotated into the defect, appropriately thinned, and inset using interrupted sutures.      We then turned our attention to the forehead donor site.  An area of 3.5 x 10 square cm defect was then address and bilateral forehead flaps were advanced medially to allow for primary closure of the secondary defect site.  The wound was closed in a multi-layered fashion, using 3-0 monocryl and 5-0 nylon for the skin.  The hair-baring skin was closed with staples.     A 5 cm x 2.5 cm full thickness skin graft was  designed, injected with 1% lidocaine with 1:100,000 epinephrine, and harvested from the right lateral arm. This was secured to the posterior edge of the forehead flap using chromic suture. The arm was closed using 3-0 vicryl for the deep dermal layer and 4-0 chromic for the skin.     A piece of xeroform was wrapped loosely around the tissue bridge.  Antibiotic ointment was applied to all of the incisions.  This concluded the goals of the procedure.  The patient was turned over to Anesthesia, extubated, and transferred to the PACU.      Complications:  None; patient tolerated the procedure well.    Disposition: PACU - hemodynamically stable.  Condition: stable     Additional Details:     Attending Attestation:     Duke Shipman  Phone Number: [phone]

## 2025-01-28 NOTE — HOSPITAL COURSE
Kiley Ward is a 69 y.o. female with Melanoma of nose (Multi), who presented for nasal reconstruction with a paramedian forehead flap, auricular cartilage graft, and skin graft by Dr Shipman on 01/28/25. Patient had an uncomplicated surgical course. Patient recovered in PACU and was transferred to [address] for post-operative care.    Patient post-operative course was uncomplicated. On day of discharge, post-operative pain was well controlled with enteral pain medication, breathing on room air, voiding spontaneously ambulating well, and was tolerating a diet. Follow-up arranged.

## 2025-01-28 NOTE — ANESTHESIA POSTPROCEDURE EVALUATION
Patient: Kiley Ward    Procedure Summary       Date: 01/28/25 Room / Location: [facility] OR 05 / Virtual [facility] OR    Anesthesia Start: 0726 Anesthesia Stop: 1100    Procedure: FOREHEAD FLAP, NASAL RECONSTRUCTION, CARTILAGE/SKIN GRAFT (Bilateral) Diagnosis:       Melanoma of nose (Multi)      (Melanoma of nose (Multi) [C43.31])    Surgeons: Duke Shipman MD Responsible Provider: Arsenio Lund MD    Anesthesia Type: general ASA Status: 3            Anesthesia Type: general    Vitals Value Taken Time   /73 01/28/25 1230   Temp 36.4 °C (97.5 °F) 01/28/25 1100   Pulse 83 01/28/25 1234   Resp 6 01/28/25 1234   SpO2 94 % 01/28/25 1234   Vitals shown include unfiled device data.    Anesthesia Post Evaluation    Patient location during evaluation: PACU  Patient participation: complete - patient participated  Level of consciousness: awake and alert  Pain management: adequate  Multimodal analgesia pain management approach  Airway patency: patent  Two or more strategies used to mitigate risk of obstructive sleep apnea  Cardiovascular status: acceptable and hemodynamically stable  Respiratory status: acceptable and room air  Hydration status: acceptable  Postoperative Nausea and Vomiting: none        No notable events documented.

## 2025-01-29 VITALS
DIASTOLIC BLOOD PRESSURE: 86 MMHG | RESPIRATION RATE: 16 BRPM | TEMPERATURE: 97.2 F | HEIGHT: 64 IN | BODY MASS INDEX: 24.92 KG/M2 | WEIGHT: 145.94 LBS | SYSTOLIC BLOOD PRESSURE: 131 MMHG | HEART RATE: 78 BPM | OXYGEN SATURATION: 96 %

## 2025-01-29 PROCEDURE — 2500000004 HC RX 250 GENERAL PHARMACY W/ HCPCS (ALT 636 FOR OP/ED): Performed by: STUDENT IN AN ORGANIZED HEALTH CARE EDUCATION/TRAINING PROGRAM

## 2025-01-29 PROCEDURE — 96372 THER/PROPH/DIAG INJ SC/IM: CPT | Performed by: STUDENT IN AN ORGANIZED HEALTH CARE EDUCATION/TRAINING PROGRAM

## 2025-01-29 PROCEDURE — 2500000001 HC RX 250 WO HCPCS SELF ADMINISTERED DRUGS (ALT 637 FOR MEDICARE OP): Performed by: STUDENT IN AN ORGANIZED HEALTH CARE EDUCATION/TRAINING PROGRAM

## 2025-01-29 PROCEDURE — 7100000011 HC EXTENDED STAY RECOVERY HOURLY - NURSING UNIT

## 2025-01-29 RX ORDER — CARVEDILOL 6.25 MG/1
6.25 TABLET ORAL 2 TIMES DAILY
Status: DISCONTINUED | OUTPATIENT
Start: 2025-01-29 | End: 2025-01-29 | Stop reason: HOSPADM

## 2025-01-29 RX ADMIN — CALCITRIOL CAPSULES 0.25 MCG 0.25 MCG: 0.25 CAPSULE ORAL at 08:27

## 2025-01-29 RX ADMIN — ACETAMINOPHEN 975 MG: 325 TABLET ORAL at 01:19

## 2025-01-29 RX ADMIN — MUPIROCIN: 20 OINTMENT TOPICAL at 08:44

## 2025-01-29 RX ADMIN — HYDROGEN PEROXIDE 1 APPLICATION: 30 SOLUTION TOPICAL at 08:44

## 2025-01-29 RX ADMIN — FAMOTIDINE 40 MG: 40 TABLET ORAL at 11:11

## 2025-01-29 RX ADMIN — CEPHALEXIN 500 MG: 500 CAPSULE ORAL at 01:15

## 2025-01-29 RX ADMIN — CARVEDILOL 6.25 MG: 6.25 TABLET, FILM COATED ORAL at 11:11

## 2025-01-29 RX ADMIN — ASPIRIN 81 MG: 81 TABLET, COATED ORAL at 08:26

## 2025-01-29 RX ADMIN — CEPHALEXIN 500 MG: 500 CAPSULE ORAL at 11:12

## 2025-01-29 RX ADMIN — POLYETHYLENE GLYCOL 3350 17 G: 17 POWDER, FOR SOLUTION ORAL at 08:48

## 2025-01-29 RX ADMIN — HEPARIN SODIUM 5000 UNITS: 5000 INJECTION, SOLUTION INTRAVENOUS; SUBCUTANEOUS at 01:15

## 2025-01-29 RX ADMIN — CEPHALEXIN 500 MG: 500 CAPSULE ORAL at 06:13

## 2025-01-29 ASSESSMENT — COGNITIVE AND FUNCTIONAL STATUS - GENERAL
DRESSING REGULAR UPPER BODY CLOTHING: A LITTLE
HELP NEEDED FOR BATHING: A LITTLE
EATING MEALS: A LITTLE
CLIMB 3 TO 5 STEPS WITH RAILING: A LITTLE
TOILETING: A LITTLE
TURNING FROM BACK TO SIDE WHILE IN FLAT BAD: A LITTLE
STANDING UP FROM CHAIR USING ARMS: A LITTLE
PERSONAL GROOMING: A LITTLE
WALKING IN HOSPITAL ROOM: A LITTLE
MOVING TO AND FROM BED TO CHAIR: A LITTLE
MOBILITY SCORE: 19
DAILY ACTIVITIY SCORE: 18
DRESSING REGULAR LOWER BODY CLOTHING: A LITTLE

## 2025-01-29 ASSESSMENT — PAIN SCALES - WONG BAKER: WONGBAKER_NUMERICALRESPONSE: HURTS LITTLE BIT

## 2025-01-29 ASSESSMENT — PAIN SCALES - GENERAL: PAINLEVEL_OUTOF10: 2

## 2025-01-29 NOTE — CARE PLAN
The patient's goals for the shift include pt will remain stable    The clinical goals for the shift include pt will remain stable    Problem: Pain  Goal: Takes deep breaths with improved pain control throughout the shift  Outcome: Progressing     Problem: Pain  Goal: Free from acute confusion related to pain meds throughout the shift  Outcome: Progressing     Problem: Skin  Goal: Decreased wound size/increased tissue granulation at next dressing change  Outcome: Progressing

## 2025-01-29 NOTE — PROGRESS NOTES
Otolaryngology - Head and Neck Surgery Progress Note    Subjective:  No acute events overnight. Doing well. Pain controlled.     Objective:  Scheduled medications  acetaminophen, 975 mg, oral, q8h GARY   Or  acetaminophen, 1,000 mg, oral, q8h GARY   Or  acetaminophen, 1,000 mg, g-tube, q8h GARY  aspirin, 81 mg, oral, Daily  atorvastatin, 10 mg, oral, Nightly  calcitriol, 0.25 mcg, oral, Daily  cephalexin, 500 mg, oral, q6h AGRY  famotidine, 40 mg, oral, Daily  heparin (porcine), 5,000 Units, subcutaneous, q8h  hydrogen peroxide, 1 Application, Topical, TID  mupirocin, , Topical, TID  polyethylene glycol, 17 g, oral, Daily      Continuous medications     PRN medications  PRN medications: naloxone, ondansetron ODT **OR** ondansetron, oxyCODONE, oxyCODONE **OR** oxyCODONE **OR** oxyCODONE    Recent Labs:  No results found for this or any previous visit (from the past 24 hours).      Physical Exam  Visit Vitals  /71 (BP Location: Left arm, Patient Position: Lying)   Pulse 84   Temp 36.7 °C (98.1 °F) (Temporal)   Resp 16     General: Alert, oriented, no acute distress  Resp: Breathing comfortably on room air, no stridor  Head: forehead with dressing in place, no strikethrough bleeding apprecitated  Oral Cavity: MMM  Ears: external ears normal  Nose: external nose midline, paramedian flap in place with incision intact, flap wrapped in xerform gauze  Neck: trachea midline    Assessment:  Kiley Ward is a 69 y.o. female with Melanoma of nose (Multi) who presented for a paramedian forehead rotational flap, Right ear cartilage graft, and right arm FTSG by Dr. Shipman on 1/28/24. Currently doing well      Plan:  ENT: standard wound care of incisions  Neuro/Pain: tylenol, oxy5/10  CV: continue to monitor vitals  Pulm: IS, maintain O2 sats >92%   GI/FEN:  continue home protonix, bowel regimen, replete electrolytes as needed  : Voiding spontaneously   Heme: Monitor CBC  ID:  monitor for infection  Endo: no hx of DM    MSK: ANGELIQUE  Dispo: home today       DeBettye Alvarez MD  Dept. of Otolaryngology - Head and Neck Surgery, PGY-5  ENT Consults: [id number]  ENT Overnight (5p-6a), and Weekends: [id number]  ENT Head and Neck Surgery Phone: [phone]  ENT Peds: [id number]  ENT Outpatient scheduling number: [phone]

## 2025-01-29 NOTE — DISCHARGE SUMMARY
Discharge Diagnosis  Melanoma of nose (Multi)    Issues Requiring Follow-Up  Post-op follow-up    Test Results Pending At Discharge  Pending Labs       Order Current Status    Surgical Pathology Exam In process            Hospital Course  Kiley Ward is a 69 y.o. female with Melanoma of nose (Multi), who presented for nasal reconstruction with a paramedian forehead flap, auricular cartilage graft, and skin graft by Dr Shipman on 01/28/25. Patient had an uncomplicated surgical course. Patient recovered in PACU and was transferred to [address] for post-operative care.    Patient post-operative course was uncomplicated. On day of discharge, post-operative pain was well controlled with enteral pain medication, breathing on room air, voiding spontaneously ambulating well, and was tolerating a diet. Follow-up arranged.      Pertinent Physical Exam At Time of Discharge  Please see daily progress note for day of discharge physical examination.    Home Medications     Medication List      START taking these medications     cephalexin 500 mg capsule; Commonly known as: Keflex; Take 1 capsule   (500 mg) by mouth 2 times a day for 7 days.   mupirocin 2 % ointment; Commonly known as: Bactroban; Apply 1   Application topically 3 times a day. Apply to nasal tip incisions and   xeroform dressing.   ondansetron ODT 4 mg disintegrating tablet; Commonly known as:   Zofran-ODT; Dissolve 1 tablet (4 mg) in the mouth every 8 hours if needed   for nausea or vomiting for up to 3 days.   oxyCODONE 5 mg immediate release tablet; Commonly known as: Roxicodone;   Take 1 tablet (5 mg) by mouth every 6 hours if needed for severe pain (7 -   10) for up to 7 days.     CONTINUE taking these medications     acetaminophen 325 mg tablet; Commonly known as: Tylenol; Take 2 tablets   (650 mg) by mouth every 6 hours if needed for mild pain (1 - 3) or   moderate pain (4 - 6).   aspirin 81 mg EC tablet; Take 1 tablet (81 mg) by mouth once daily.    atorvastatin 10 mg tablet; Commonly known as: Lipitor; Take 1 tablet (10   mg) by mouth once daily at bedtime.   calcitriol 0.25 mcg capsule; Commonly known as: Rocaltrol   carvedilol 6.25 mg tablet; Commonly known as: Coreg; Take 1 tablet (6.25   mg) by mouth 2 times daily (morning and late afternoon).   dexlansoprazole 60 mg DR capsule; Commonly known as: Dexilant; Take 1   capsule (60 mg) by mouth once daily.   ergocalciferol 1.25 MG (38445 UT) capsule; Commonly known as: Vitamin   D-2   famotidine 40 mg tablet; Commonly known as: Pepcid       Outpatient Follow-Up  Future Appointments   Date Time Provider Department Center   2/3/2025 12:30 PM Shannan Garrett PA-C [id number] [facility]   2/20/2025 10:30 AM Eloise Naylor DO [id number] [facility]   2/24/2025  8:30 AM Indra Torres MD [id number] [facility]   3/13/2025  9:00 AM Brenda Rodrigues APRN-[facility] [id number] [facility]   4/3/2025  8:45 AM Leon Ramirez MD [id number] [facility]   8/19/2025  9:30 AM [facility]ITU PRITI 6 [facility] RITU Rad   8/19/2025 10:30 AM Nicky Mayers APRN-CNP [id number] [facility]       Manjinder Alvarez MD

## 2025-01-31 DIAGNOSIS — C43.31: ICD-10-CM

## 2025-02-02 LAB
ATRIAL RATE: 77 BPM
P AXIS: 35 DEGREES
P OFFSET: 190 MS
P ONSET: 130 MS
PR INTERVAL: 168 MS
Q ONSET: 214 MS
QRS COUNT: 13 BEATS
QRS DURATION: 84 MS
QT INTERVAL: 402 MS
QTC CALCULATION(BAZETT): 454 MS
QTC FREDERICIA: 436 MS
R AXIS: -34 DEGREES
T AXIS: 22 DEGREES
T OFFSET: 415 MS
VENTRICULAR RATE: 77 BPM

## 2025-02-03 ENCOUNTER — APPOINTMENT (OUTPATIENT)
Dept: OTOLARYNGOLOGY | Facility: CLINIC | Age: 70
End: 2025-02-03
Payer: MEDICARE

## 2025-02-04 NOTE — H&P (VIEW-ONLY)
Facial Plastic & Reconstructive Surgery    Dx: Nasal melanoma   POV s/p 1/28/25 FOREHEAD FLAP, NASAL RECONSTRUCTION, CARTILAGE/SKIN GRAFT     Doing well, endorses improved breathing bilaterally  Continues salt water sprays and ointment to nares    Staples and sutures removed without issue  Nasal dressing removed, revealing well perfused flap, no concerns for infection or dehiscence   Septum midline  Nasal airway patent  Incisions c/d/I at right ear and right upper arm                  Plan:  Wound care as instructed, xeroform ordered  Next stage surgery planned for 2/25/25.      Shannan Garrett PA-C

## 2025-02-06 ENCOUNTER — APPOINTMENT (OUTPATIENT)
Facility: CLINIC | Age: 70
End: 2025-02-06
Payer: MEDICARE

## 2025-02-06 VITALS — BODY MASS INDEX: 24.75 KG/M2 | HEIGHT: 64 IN | WEIGHT: 145 LBS

## 2025-02-06 DIAGNOSIS — Z48.1: Primary | ICD-10-CM

## 2025-02-06 DIAGNOSIS — C43.31 MELANOMA OF NOSE (MULTI): ICD-10-CM

## 2025-02-06 LAB
LAB AP ASR DISCLAIMER: NORMAL
LABORATORY COMMENT REPORT: NORMAL
PATH REPORT.FINAL DX SPEC: NORMAL
PATH REPORT.GROSS SPEC: NORMAL
PATH REPORT.RELEVANT HX SPEC: NORMAL
PATH REPORT.TOTAL CANCER: NORMAL

## 2025-02-06 PROCEDURE — 1159F MED LIST DOCD IN RCRD: CPT | Performed by: PHYSICIAN ASSISTANT

## 2025-02-06 PROCEDURE — 1160F RVW MEDS BY RX/DR IN RCRD: CPT | Performed by: PHYSICIAN ASSISTANT

## 2025-02-06 PROCEDURE — 3008F BODY MASS INDEX DOCD: CPT | Performed by: PHYSICIAN ASSISTANT

## 2025-02-06 PROCEDURE — 99024 POSTOP FOLLOW-UP VISIT: CPT | Performed by: PHYSICIAN ASSISTANT

## 2025-02-06 PROCEDURE — 1157F ADVNC CARE PLAN IN RCRD: CPT | Performed by: PHYSICIAN ASSISTANT

## 2025-02-06 PROCEDURE — 1036F TOBACCO NON-USER: CPT | Performed by: PHYSICIAN ASSISTANT

## 2025-02-06 RX ORDER — MUPIROCIN 20 MG/G
1 OINTMENT TOPICAL 3 TIMES DAILY
Qty: 30 G | Refills: 3 | Status: SHIPPED | OUTPATIENT
Start: 2025-02-06 | End: 2026-03-13

## 2025-02-06 RX ORDER — BISMUTH TRIBROMOPH/PETROLATUM 5"X9"
1 BANDAGE TOPICAL DAILY
Qty: 50 EACH | Refills: 0 | Status: SHIPPED | OUTPATIENT
Start: 2025-02-06

## 2025-02-20 ENCOUNTER — APPOINTMENT (OUTPATIENT)
Dept: PRIMARY CARE | Facility: CLINIC | Age: 70
End: 2025-02-20
Payer: MEDICARE

## 2025-02-24 ENCOUNTER — APPOINTMENT (OUTPATIENT)
Dept: OPHTHALMOLOGY | Facility: CLINIC | Age: 70
End: 2025-02-24
Payer: MEDICARE

## 2025-02-24 ENCOUNTER — ANESTHESIA EVENT (OUTPATIENT)
Dept: OPERATING ROOM | Facility: HOSPITAL | Age: 70
End: 2025-02-24
Payer: MEDICARE

## 2025-02-25 ENCOUNTER — ANESTHESIA (OUTPATIENT)
Dept: OPERATING ROOM | Facility: HOSPITAL | Age: 70
End: 2025-02-25
Payer: MEDICARE

## 2025-02-25 ENCOUNTER — HOSPITAL ENCOUNTER (OUTPATIENT)
Facility: HOSPITAL | Age: 70
Setting detail: OUTPATIENT SURGERY
Discharge: HOME | End: 2025-02-25
Attending: OTOLARYNGOLOGY | Admitting: OTOLARYNGOLOGY
Payer: MEDICARE

## 2025-02-25 VITALS
OXYGEN SATURATION: 98 % | DIASTOLIC BLOOD PRESSURE: 99 MMHG | TEMPERATURE: 98.2 F | SYSTOLIC BLOOD PRESSURE: 159 MMHG | RESPIRATION RATE: 16 BRPM | HEART RATE: 73 BPM

## 2025-02-25 DIAGNOSIS — C43.31: Primary | ICD-10-CM

## 2025-02-25 LAB
ABO GROUP (TYPE) IN BLOOD: NORMAL
ANTIBODY SCREEN: NORMAL
RH FACTOR (ANTIGEN D): NORMAL

## 2025-02-25 PROCEDURE — 2720000007 HC OR 272 NO HCPCS: Performed by: OTOLARYNGOLOGY

## 2025-02-25 PROCEDURE — 15770 DERMA-FAT-FASCIA GRAFT: CPT | Performed by: OTOLARYNGOLOGY

## 2025-02-25 PROCEDURE — 7100000009 HC PHASE TWO TIME - INITIAL BASE CHARGE: Performed by: OTOLARYNGOLOGY

## 2025-02-25 PROCEDURE — 2500000004 HC RX 250 GENERAL PHARMACY W/ HCPCS (ALT 636 FOR OP/ED): Performed by: OTOLARYNGOLOGY

## 2025-02-25 PROCEDURE — 3700000001 HC GENERAL ANESTHESIA TIME - INITIAL BASE CHARGE: Performed by: OTOLARYNGOLOGY

## 2025-02-25 PROCEDURE — 3700000002 HC GENERAL ANESTHESIA TIME - EACH INCREMENTAL 1 MINUTE: Performed by: OTOLARYNGOLOGY

## 2025-02-25 PROCEDURE — 7100000010 HC PHASE TWO TIME - EACH INCREMENTAL 1 MINUTE: Performed by: OTOLARYNGOLOGY

## 2025-02-25 PROCEDURE — 3600000007 HC OR TIME - EACH INCREMENTAL 1 MINUTE - PROCEDURE LEVEL TWO: Performed by: OTOLARYNGOLOGY

## 2025-02-25 PROCEDURE — 15620 DELAY FLAP F/C/C/N/AX/G/H/F: CPT | Performed by: OTOLARYNGOLOGY

## 2025-02-25 PROCEDURE — A14060 PR ADJ TISS XFER LID,NOS,EAR <10 SQCM: Performed by: ANESTHESIOLOGIST ASSISTANT

## 2025-02-25 PROCEDURE — 2500000005 HC RX 250 GENERAL PHARMACY W/O HCPCS: Performed by: OTOLARYNGOLOGY

## 2025-02-25 PROCEDURE — 2500000004 HC RX 250 GENERAL PHARMACY W/ HCPCS (ALT 636 FOR OP/ED): Performed by: ANESTHESIOLOGIST ASSISTANT

## 2025-02-25 PROCEDURE — 3600000002 HC OR TIME - INITIAL BASE CHARGE - PROCEDURE LEVEL TWO: Performed by: OTOLARYNGOLOGY

## 2025-02-25 PROCEDURE — 15630 DELAY FLAP EYE/NOS/EAR/LIP: CPT | Performed by: OTOLARYNGOLOGY

## 2025-02-25 PROCEDURE — 2500000001 HC RX 250 WO HCPCS SELF ADMINISTERED DRUGS (ALT 637 FOR MEDICARE OP): Performed by: OTOLARYNGOLOGY

## 2025-02-25 PROCEDURE — 86901 BLOOD TYPING SEROLOGIC RH(D): CPT | Performed by: ANESTHESIOLOGY

## 2025-02-25 PROCEDURE — 2500000002 HC RX 250 W HCPCS SELF ADMINISTERED DRUGS (ALT 637 FOR MEDICARE OP, ALT 636 FOR OP/ED): Performed by: ANESTHESIOLOGIST ASSISTANT

## 2025-02-25 PROCEDURE — 36415 COLL VENOUS BLD VENIPUNCTURE: CPT | Performed by: ANESTHESIOLOGY

## 2025-02-25 PROCEDURE — 7100000002 HC RECOVERY ROOM TIME - EACH INCREMENTAL 1 MINUTE: Performed by: OTOLARYNGOLOGY

## 2025-02-25 PROCEDURE — 15004 WOUND PREP F/N/HF/G: CPT | Performed by: OTOLARYNGOLOGY

## 2025-02-25 PROCEDURE — 7100000001 HC RECOVERY ROOM TIME - INITIAL BASE CHARGE: Performed by: OTOLARYNGOLOGY

## 2025-02-25 PROCEDURE — 14060 TIS TRNFR E/N/E/L 10 SQ CM/<: CPT | Performed by: OTOLARYNGOLOGY

## 2025-02-25 RX ORDER — ONDANSETRON HYDROCHLORIDE 2 MG/ML
4 INJECTION, SOLUTION INTRAVENOUS ONCE AS NEEDED
Status: DISCONTINUED | OUTPATIENT
Start: 2025-02-25 | End: 2025-02-25 | Stop reason: HOSPADM

## 2025-02-25 RX ORDER — LIDOCAINE HYDROCHLORIDE 10 MG/ML
0.1 INJECTION, SOLUTION INFILTRATION; PERINEURAL ONCE
Status: DISCONTINUED | OUTPATIENT
Start: 2025-02-25 | End: 2025-02-25 | Stop reason: HOSPADM

## 2025-02-25 RX ORDER — ONDANSETRON HYDROCHLORIDE 2 MG/ML
INJECTION, SOLUTION INTRAVENOUS AS NEEDED
Status: DISCONTINUED | OUTPATIENT
Start: 2025-02-25 | End: 2025-02-25

## 2025-02-25 RX ORDER — SODIUM CHLORIDE, SODIUM LACTATE, POTASSIUM CHLORIDE, CALCIUM CHLORIDE 600; 310; 30; 20 MG/100ML; MG/100ML; MG/100ML; MG/100ML
100 INJECTION, SOLUTION INTRAVENOUS CONTINUOUS
Status: DISCONTINUED | OUTPATIENT
Start: 2025-02-25 | End: 2025-02-25 | Stop reason: HOSPADM

## 2025-02-25 RX ORDER — GLYCOPYRROLATE 0.2 MG/ML
INJECTION INTRAMUSCULAR; INTRAVENOUS AS NEEDED
Status: DISCONTINUED | OUTPATIENT
Start: 2025-02-25 | End: 2025-02-25

## 2025-02-25 RX ORDER — MIDAZOLAM HYDROCHLORIDE 1 MG/ML
INJECTION INTRAMUSCULAR; INTRAVENOUS AS NEEDED
Status: DISCONTINUED | OUTPATIENT
Start: 2025-02-25 | End: 2025-02-25

## 2025-02-25 RX ORDER — METOCLOPRAMIDE HYDROCHLORIDE 5 MG/ML
10 INJECTION INTRAMUSCULAR; INTRAVENOUS ONCE AS NEEDED
Status: DISCONTINUED | OUTPATIENT
Start: 2025-02-25 | End: 2025-02-25 | Stop reason: HOSPADM

## 2025-02-25 RX ORDER — CEPHALEXIN 500 MG/1
500 CAPSULE ORAL 4 TIMES DAILY
Qty: 20 CAPSULE | Refills: 0 | Status: SHIPPED | OUTPATIENT
Start: 2025-02-25 | End: 2025-03-02

## 2025-02-25 RX ORDER — HYDROMORPHONE HYDROCHLORIDE 0.2 MG/ML
0.2 INJECTION INTRAMUSCULAR; INTRAVENOUS; SUBCUTANEOUS EVERY 5 MIN PRN
Status: DISCONTINUED | OUTPATIENT
Start: 2025-02-25 | End: 2025-02-25 | Stop reason: HOSPADM

## 2025-02-25 RX ORDER — LIDOCAINE HCL/PF 100 MG/5ML
SYRINGE (ML) INTRAVENOUS AS NEEDED
Status: DISCONTINUED | OUTPATIENT
Start: 2025-02-25 | End: 2025-02-25

## 2025-02-25 RX ORDER — APREPITANT 40 MG/1
CAPSULE ORAL AS NEEDED
Status: DISCONTINUED | OUTPATIENT
Start: 2025-02-25 | End: 2025-02-25

## 2025-02-25 RX ORDER — OXYMETAZOLINE HCL 0.05 %
SPRAY, NON-AEROSOL (ML) NASAL AS NEEDED
Status: DISCONTINUED | OUTPATIENT
Start: 2025-02-25 | End: 2025-02-25 | Stop reason: HOSPADM

## 2025-02-25 RX ORDER — HYDROMORPHONE HYDROCHLORIDE 1 MG/ML
INJECTION, SOLUTION INTRAMUSCULAR; INTRAVENOUS; SUBCUTANEOUS AS NEEDED
Status: DISCONTINUED | OUTPATIENT
Start: 2025-02-25 | End: 2025-02-25

## 2025-02-25 RX ORDER — FENTANYL CITRATE 50 UG/ML
INJECTION, SOLUTION INTRAMUSCULAR; INTRAVENOUS AS NEEDED
Status: DISCONTINUED | OUTPATIENT
Start: 2025-02-25 | End: 2025-02-25

## 2025-02-25 RX ORDER — ROCURONIUM BROMIDE 10 MG/ML
INJECTION, SOLUTION INTRAVENOUS AS NEEDED
Status: DISCONTINUED | OUTPATIENT
Start: 2025-02-25 | End: 2025-02-25

## 2025-02-25 RX ORDER — SODIUM CHLORIDE, SODIUM LACTATE, POTASSIUM CHLORIDE, CALCIUM CHLORIDE 600; 310; 30; 20 MG/100ML; MG/100ML; MG/100ML; MG/100ML
INJECTION, SOLUTION INTRAVENOUS CONTINUOUS PRN
Status: DISCONTINUED | OUTPATIENT
Start: 2025-02-25 | End: 2025-02-25

## 2025-02-25 RX ORDER — BACITRACIN 500 [USP'U]/G
OINTMENT TOPICAL AS NEEDED
Status: DISCONTINUED | OUTPATIENT
Start: 2025-02-25 | End: 2025-02-25 | Stop reason: HOSPADM

## 2025-02-25 RX ORDER — WATER 1 ML/ML
IRRIGANT IRRIGATION AS NEEDED
Status: DISCONTINUED | OUTPATIENT
Start: 2025-02-25 | End: 2025-02-25 | Stop reason: HOSPADM

## 2025-02-25 RX ORDER — CEFAZOLIN 1 G/1
INJECTION, POWDER, FOR SOLUTION INTRAVENOUS AS NEEDED
Status: DISCONTINUED | OUTPATIENT
Start: 2025-02-25 | End: 2025-02-25

## 2025-02-25 RX ORDER — ACETAMINOPHEN 325 MG/1
650 TABLET ORAL EVERY 4 HOURS PRN
Status: DISCONTINUED | OUTPATIENT
Start: 2025-02-25 | End: 2025-02-25 | Stop reason: HOSPADM

## 2025-02-25 RX ORDER — PROPOFOL 10 MG/ML
INJECTION, EMULSION INTRAVENOUS AS NEEDED
Status: DISCONTINUED | OUTPATIENT
Start: 2025-02-25 | End: 2025-02-25

## 2025-02-25 RX ORDER — SODIUM CHLORIDE 0.9 G/100ML
INJECTION, SOLUTION IRRIGATION AS NEEDED
Status: DISCONTINUED | OUTPATIENT
Start: 2025-02-25 | End: 2025-02-25 | Stop reason: HOSPADM

## 2025-02-25 RX ORDER — FENTANYL CITRATE 50 UG/ML
25 INJECTION, SOLUTION INTRAMUSCULAR; INTRAVENOUS EVERY 5 MIN PRN
Status: DISCONTINUED | OUTPATIENT
Start: 2025-02-25 | End: 2025-02-25 | Stop reason: HOSPADM

## 2025-02-25 RX ADMIN — ROCURONIUM BROMIDE 10 MG: 10 INJECTION INTRAVENOUS at 09:00

## 2025-02-25 RX ADMIN — DEXAMETHASONE SODIUM PHOSPHATE 10 MG: 4 INJECTION INTRA-ARTICULAR; INTRALESIONAL; INTRAMUSCULAR; INTRAVENOUS; SOFT TISSUE at 07:42

## 2025-02-25 RX ADMIN — CEFAZOLIN 2 G: 1 INJECTION, POWDER, FOR SOLUTION INTRAMUSCULAR; INTRAVENOUS at 07:42

## 2025-02-25 RX ADMIN — GLYCOPYRROLATE 0.2 MG: 0.2 INJECTION, SOLUTION INTRAMUSCULAR; INTRAVENOUS at 07:42

## 2025-02-25 RX ADMIN — SODIUM CHLORIDE, POTASSIUM CHLORIDE, SODIUM LACTATE AND CALCIUM CHLORIDE: 600; 310; 30; 20 INJECTION, SOLUTION INTRAVENOUS at 07:30

## 2025-02-25 RX ADMIN — PROPOFOL 130 MG: 10 INJECTION, EMULSION INTRAVENOUS at 07:36

## 2025-02-25 RX ADMIN — LIDOCAINE HYDROCHLORIDE 100 MG: 20 INJECTION INTRAVENOUS at 07:36

## 2025-02-25 RX ADMIN — FENTANYL CITRATE 100 MCG: 50 INJECTION, SOLUTION INTRAMUSCULAR; INTRAVENOUS at 07:36

## 2025-02-25 RX ADMIN — MIDAZOLAM HYDROCHLORIDE 2 MG: 1 INJECTION, SOLUTION INTRAMUSCULAR; INTRAVENOUS at 07:30

## 2025-02-25 RX ADMIN — APREPITANT 40 MG: 40 CAPSULE ORAL at 07:15

## 2025-02-25 RX ADMIN — PROPOFOL 25 MCG/KG/MIN: 10 INJECTION, EMULSION INTRAVENOUS at 07:52

## 2025-02-25 RX ADMIN — ROCURONIUM BROMIDE 50 MG: 10 INJECTION INTRAVENOUS at 07:41

## 2025-02-25 RX ADMIN — ONDANSETRON 4 MG: 2 INJECTION, SOLUTION INTRAMUSCULAR; INTRAVENOUS at 09:14

## 2025-02-25 RX ADMIN — HYDROMORPHONE HYDROCHLORIDE 0.25 MG: 1 INJECTION, SOLUTION INTRAMUSCULAR; INTRAVENOUS; SUBCUTANEOUS at 09:34

## 2025-02-25 SDOH — HEALTH STABILITY: MENTAL HEALTH: CURRENT SMOKER: 0

## 2025-02-25 ASSESSMENT — PAIN SCALES - GENERAL
PAINLEVEL_OUTOF10: 0 - NO PAIN
PAIN_LEVEL: 0
PAINLEVEL_OUTOF10: 0 - NO PAIN
PAINLEVEL_OUTOF10: 0 - NO PAIN

## 2025-02-25 ASSESSMENT — COLUMBIA-SUICIDE SEVERITY RATING SCALE - C-SSRS
2. HAVE YOU ACTUALLY HAD ANY THOUGHTS OF KILLING YOURSELF?: NO
6. HAVE YOU EVER DONE ANYTHING, STARTED TO DO ANYTHING, OR PREPARED TO DO ANYTHING TO END YOUR LIFE?: NO
1. IN THE PAST MONTH, HAVE YOU WISHED YOU WERE DEAD OR WISHED YOU COULD GO TO SLEEP AND NOT WAKE UP?: NO

## 2025-02-25 ASSESSMENT — PAIN - FUNCTIONAL ASSESSMENT
PAIN_FUNCTIONAL_ASSESSMENT: 0-10

## 2025-02-25 NOTE — OP NOTE
forehead flap takedown Operative Note     Date: 2025  OR Location: [facility] OR    Name: Kiley Ward, : 1955, Age: 69 y.o., MRN: [id number], Sex: female    Diagnosis  Pre-op Diagnosis      * Malignant melanoma of skin of nose, external (Multi) [C43.31] Post-op Diagnosis     * Malignant melanoma of skin of nose, external (Multi) [C43.31]     Procedures  forehead flap takedown  WI ADJT TIS TRNSFR/REARRGMT E/N/E/L DFCT 10 SQ CM/< [56356]  WI DELAY FLAP/SECTIONING FLAP F/C/C/N/AX/G/H/F [74831]  WI DELAY FLAP/SCTJ FLAP EYELIDS NOSE EARS/LIPS [13516]  WI PREP SITE F/S/N/H/F/G/M/D GT 1ST 100 SQ CM/1PCT [82227]  WI DERMAL-FAT-FASCIA GRAFT [13204]  Surgeons      * Duke Shipman - Primary    Resident/Fellow/Other Assistant:  Surgeons and Role:     * Aminata Rainey MD - Resident - Assisting    Staff:   Circulator: Dov Winston Person: Yannick  Circulator: Jacqueline    Anesthesia Staff: Anesthesiologist: Mariusz Daniels MD  C-AA: ESPERANZA Mane  BRIA: Oscar Fong    Procedure Summary  Anesthesia: General  ASA: III  Estimated Blood Loss: 5mL  Intra-op Medications:   Administrations occurring from 0700 to 0925 on 25:   Medication Name Total Dose   sodium chloride 0.9 % irrigation solution 1,000 mL   oxymetazoline (Afrin) 0.05 % nasal spray 15 mL   sterile water irrigation solution 500 mL   lidocaine-epinephrine (Xylocaine W/EPI) 1 %-1:100,000 18 mL, tranexamic acid (Cyklokapron) 200 mg in sodium chloride 0.9 % 100 mL irrigation 16 mL   EPINEPHrine (Adrenalin) 2 mg, tranexamic acid (Cyklokapron) 1,000 mg in sodium chloride 0.9 % 20 mL irrigation 32 mL   balanced salts (BSS) intraocular solution 15 mL   aprepitant (Emend) capsule 40 mg   ceFAZolin (Ancef) vial 1 g 2 g   dexAMETHasone (Decadron) injection 4 mg/mL 10 mg   fentaNYL (Sublimaze) injection 50 mcg/mL 100 mcg   glycopyrrolate (Robinul) injection 0.2 mg   LR infusion Cannot be calculated   lidocaine (cardiac) injection 2%  prefilled syringe 100 mg   midazolam PF (Versed) injection 1 mg/mL 2 mg   ondansetron (Zofran) 2 mg/mL injection 4 mg   propofol (Diprivan) injection 10 mg/mL 280.2 mg   rocuronium (ZeMuron) 50 mg/5 mL injection 60 mg              Anesthesia Record               Intraprocedure I/O Totals       None           Specimen: No specimens collected              Drains and/or Catheters: * None in log *    Tourniquet Times:         Implants:     Findings: Robust vascular pedicle from previous paramedian forehead flap. Flap takedown and sutured in place, small fasciocutaneous graft placed under left alar flap to help with contouring.     Indications: Kiley Ward is an 69 y.o. female who is having surgery for Malignant melanoma of skin of nose, external (Multi) [C43.31].     The patient was seen in the preoperative area. The risks, benefits, complications, treatment options, non-operative alternatives, expected recovery and outcomes were discussed with the patient. The possibilities of reaction to medication, pulmonary aspiration, injury to surrounding structures, bleeding, recurrent infection, the need for additional procedures, failure to diagnose a condition, and creating a complication requiring transfusion or operation were discussed with the patient. The patient concurred with the proposed plan, giving informed consent.  The site of surgery was properly noted/marked if necessary per policy. The patient has been actively warmed in preoperative area. Preoperative antibiotics have been ordered and given within 1 hours of incision. Venous thrombosis prophylaxis are not indicated.    Procedure Details:     The patient was taken to the operating room and placed supine on the table.  The anesthesia team induced a state of general anesthesia and placed an LMA device.  The table was rotated 90 degrees.  The patient was prepped and draped in the usual fashion.  The eyes were protected.     1% lidocaine with epinephrine in a  1:100,000 dilution was infiltrated in the areas of planned incisions. The pedicle of the flap was sharply transected at the midpoint with a 15-blade scalpel.  The tubed portion of the distal flap was opened to restore a flat shape.  A 15-blade scalpel was used to sharply prepare the inset site at the nasal tip by removing scar tissue and creating surgical skin edges with an outward bevel.  Adjacent tissue transfer was used to thin and rotate the nasal tip skin over an area of 1 cm x 1 cm to achieve a tension-free and smooth appearing contour at the inset site.  A dermal fat graft was harvested from the pedicle of the forehead flap that was excised. This was then placed at the nasal tip site for the purposes of contouring the nasal tip in the most appropriate fashion. Deep monocryl sutures were used and the skin was meticulously closed with interrupted 6-0 prolene and 5-0 fast absorbing gut sutures.     We then turned our attention to the forehead inset site.  The tubed portion of the proximal flap was opened to restore a flat shape. The flap was trimmed and thinned and rotated back to its original position and orientation in the brow.  It was inset with deep monocryl sutures and the skin was meticulously closed with interrupted 6-0 prolene sutures.     Ointment and dressings were applied.      This concluded the case, the patient was awoken and transferred to the recovery area in stable condition.      Complications:  None; patient tolerated the procedure well.    Disposition: PACU - hemodynamically stable.  Condition: stable                 Additional Details:     Attending Attestation:     Duke Shipman  Phone Number: [phone]

## 2025-02-25 NOTE — DISCHARGE INSTRUCTIONS
.Facial/Neck Incision Care: Cleanse all facial/neck incisions twice daily with baby shampoo or mild soap and water. Apply petroleum jelly/vaseline to incision line twice daily until incision has healed.      You may take tylenol as needed for pain. You may take your home oxy as needed for severe pain.     Take keflex antibiotic as prescribed for 5 days.

## 2025-02-25 NOTE — ANESTHESIA PREPROCEDURE EVALUATION
Patient: Kiley Ward    Procedure Information       Date/Time: 02/25/25 0700    Procedure: forehead flap takedown    Location: [facility] OR 05 / [facility] [place]    Surgeons: Duke Shipman MD            Relevant Problems   Anesthesia   (+) PONV (postoperative nausea and vomiting)      Cardiac   (+) Aneurysm of ascending aorta without rupture (CMS-HCC)   (+) Ascending aortic aneurysm, unspecified whether ruptured (CMS-HCC)   (+) HTN (hypertension)   (+) Hyperlipidemia      Pulmonary (within normal limits)      Neuro (within normal limits)      GI   (+) Acid reflux      /Renal (within normal limits)      Liver (within normal limits)      Endocrine   (+) Hyperthyroidism      Hematology   (+) ABLA (acute blood loss anemia)   (+) Acute deep vein thrombosis (DVT) of upper extremity (Multi)   (+) Chronic thromboembolic disease (Multi)   (+) History of blood transfusion      Musculoskeletal   (+) Chronic neck pain   (+) Osteoarthritis      HEENT   (+) Vision loss      ID   (+) Infection requiring contact isolation precautions      Skin   (+) Malignant melanoma of skin of nose, external (Multi)   (+) Skin cancer      GYN   (+) Breast cancer (Multi)       Clinical information reviewed:   Tobacco  Allergies    Med Hx  Surg Hx   Fam Hx  Soc Hx        NPO Detail:  NPO/Void Status  Carbohydrate Drink Given Prior to Surgery? : N  Date of Last Liquid: 02/25/25  Time of Last Liquid: 0330  Date of Last Solid: 02/24/25  Time of Last Solid: 2100  Last Intake Type: Clear fluids; Light meal  Time of Last Void: 0657         Physical Exam    Airway  Mallampati: I  TM distance: >3 FB  Neck ROM: full     Cardiovascular - normal exam     Dental - normal exam     Pulmonary - normal exam     Abdominal - normal exam         Anesthesia Plan    History of general anesthesia?: yes  History of complications of general anesthesia?: no    ASA 3     general     The patient is not a current smoker.    intravenous induction    Anesthetic plan and risks discussed with patient.  Use of blood products discussed with patient who consented to blood products.

## 2025-02-25 NOTE — ANESTHESIA POSTPROCEDURE EVALUATION
Patient: Kiley Ward    Procedure Summary       Date: 02/25/25 Room / Location: [facility] OR 05 / [facility] OR    Anesthesia Start: 0730 Anesthesia Stop: 1000    Procedure: forehead flap takedown Diagnosis:       Malignant melanoma of skin of nose, external (Multi)      (Malignant melanoma of skin of nose, external (Multi) [C43.31])    Surgeons: Duke Shipman MD Responsible Provider: Mariusz Daniels MD    Anesthesia Type: general ASA Status: 3            Anesthesia Type: general    Vitals Value Taken Time   /99 02/25/25 1100   Temp 36.8 °C (98.2 °F) 02/25/25 1100   Pulse 73 02/25/25 1100   Resp 16 02/25/25 1100   SpO2 98 % 02/25/25 1100       Anesthesia Post Evaluation    Patient location during evaluation: bedside  Patient participation: complete - patient participated  Level of consciousness: awake and alert  Pain score: 0  Pain management: adequate  Airway patency: patent  Cardiovascular status: acceptable  Respiratory status: acceptable  Hydration status: acceptable  Postoperative Nausea and Vomiting: none    There were no known notable events for this encounter.

## 2025-02-25 NOTE — ANESTHESIA PROCEDURE NOTES
Airway  Date/Time: 2/25/2025 7:39 AM  Urgency: elective    Airway not difficult    Staffing  Performed: ESPERANZA   Authorized by: Mariusz Daniels MD    Performed by: ESPERANZA Mane  Patient location during procedure: OR    Indications and Patient Condition  Indications for airway management: anesthesia and airway protection  Spontaneous Ventilation: absent  Sedation level: deep  Preoxygenated: yes  Patient position: sniffing  Mask difficulty assessment: 0 - not attempted    Final Airway Details  Final airway type: endotracheal airway      Successful airway: ETT  Cuffed: yes   Successful intubation technique: direct laryngoscopy  Facilitating devices/methods: intubating stylet  Endotracheal tube insertion site: oral  Blade: Chris  Blade size: #3  ETT size (mm): 7.0  Cormack-Lehane Classification: grade I - full view of glottis  Placement verified by: chest auscultation and capnometry   Measured from: lips  ETT to lips (cm): 22  Number of attempts at approach: 1    Additional Comments  RSI due to facial flap

## 2025-02-27 NOTE — PROGRESS NOTES
Facial Plastic & Reconstructive Surgery      POV s/p STAGE 2 forehead flap takedown on 2/25/25    Doing well, endorses improved breathing bilaterally  Continues salt water sprays and ointment to nares    Expected postop swelling  Incisions c/d/I    Photos:***    Plan:  Continue wound care as instructed TID   RTC 1 months or sooner if needed     Shannan Garrett PA-C

## 2025-03-03 ENCOUNTER — APPOINTMENT (OUTPATIENT)
Dept: OTOLARYNGOLOGY | Facility: CLINIC | Age: 70
End: 2025-03-03
Payer: MEDICARE

## 2025-03-03 DIAGNOSIS — C43.31 MELANOMA OF NOSE (MULTI): ICD-10-CM

## 2025-03-03 DIAGNOSIS — Z48.1: Primary | ICD-10-CM

## 2025-03-03 PROCEDURE — 1157F ADVNC CARE PLAN IN RCRD: CPT | Performed by: PHYSICIAN ASSISTANT

## 2025-03-03 PROCEDURE — 99024 POSTOP FOLLOW-UP VISIT: CPT | Performed by: PHYSICIAN ASSISTANT

## 2025-03-13 ENCOUNTER — OFFICE VISIT (OUTPATIENT)
Dept: CARDIOLOGY | Facility: HOSPITAL | Age: 70
End: 2025-03-13
Payer: MEDICARE

## 2025-03-13 VITALS
DIASTOLIC BLOOD PRESSURE: 104 MMHG | WEIGHT: 142 LBS | BODY MASS INDEX: 24.24 KG/M2 | OXYGEN SATURATION: 98 % | SYSTOLIC BLOOD PRESSURE: 159 MMHG | HEART RATE: 84 BPM | HEIGHT: 64 IN

## 2025-03-13 DIAGNOSIS — E78.5 HYPERLIPIDEMIA, UNSPECIFIED HYPERLIPIDEMIA TYPE: ICD-10-CM

## 2025-03-13 DIAGNOSIS — Z98.890 S/P AORTIC ANEURYSM REPAIR: ICD-10-CM

## 2025-03-13 DIAGNOSIS — I10 PRIMARY HYPERTENSION: ICD-10-CM

## 2025-03-13 DIAGNOSIS — Z86.79 S/P ASCENDING AORTIC ANEURYSM REPAIR: Primary | ICD-10-CM

## 2025-03-13 DIAGNOSIS — Z86.79 S/P AORTIC ANEURYSM REPAIR: ICD-10-CM

## 2025-03-13 DIAGNOSIS — Z98.890 S/P ASCENDING AORTIC ANEURYSM REPAIR: Primary | ICD-10-CM

## 2025-03-13 DIAGNOSIS — Z95.2 S/P AVR: ICD-10-CM

## 2025-03-13 PROCEDURE — 1159F MED LIST DOCD IN RCRD: CPT | Performed by: NURSE PRACTITIONER

## 2025-03-13 PROCEDURE — 3077F SYST BP >= 140 MM HG: CPT | Performed by: NURSE PRACTITIONER

## 2025-03-13 PROCEDURE — 99214 OFFICE O/P EST MOD 30 MIN: CPT | Performed by: NURSE PRACTITIONER

## 2025-03-13 PROCEDURE — 1157F ADVNC CARE PLAN IN RCRD: CPT | Performed by: NURSE PRACTITIONER

## 2025-03-13 PROCEDURE — 1036F TOBACCO NON-USER: CPT | Performed by: NURSE PRACTITIONER

## 2025-03-13 PROCEDURE — G2211 COMPLEX E/M VISIT ADD ON: HCPCS | Performed by: NURSE PRACTITIONER

## 2025-03-13 PROCEDURE — 3080F DIAST BP >= 90 MM HG: CPT | Performed by: NURSE PRACTITIONER

## 2025-03-13 PROCEDURE — 3008F BODY MASS INDEX DOCD: CPT | Performed by: NURSE PRACTITIONER

## 2025-03-13 RX ORDER — AMOXICILLIN 500 MG/1
CAPSULE ORAL
Qty: 4 CAPSULE | Refills: 3 | Status: SHIPPED | OUTPATIENT
Start: 2025-03-13 | End: 2025-03-23

## 2025-03-13 RX ORDER — CARVEDILOL 12.5 MG/1
12.5 TABLET ORAL
Qty: 180 TABLET | Refills: 3 | Status: SHIPPED | OUTPATIENT
Start: 2025-03-13 | End: 2026-03-13

## 2025-03-13 RX ORDER — CARVEDILOL 6.25 MG/1
12.5 TABLET ORAL
Qty: 360 TABLET | Refills: 3 | Status: SHIPPED | OUTPATIENT
Start: 2025-03-13 | End: 2025-03-13 | Stop reason: SDUPTHER

## 2025-03-13 NOTE — PATIENT INSTRUCTIONS
Increase Carvedilol to 12.5 mg twice a day  Continue all other meds  You need to take Amoxicillin 2 grams 30 to 60 minutes prior to any dental cleaning or procedure  Continue physical activity  Monitor blood pressure at home and message me in 1-2 weeks with a few readings  Follow up in November with Dr. Muniz with an echo

## 2025-03-13 NOTE — PROGRESS NOTES
Primary Care Physician: Eloise Naylor DO  Date of Visit: 03/13/2025  9:00 AM EDT  Location of visit: [facility]     Chief Complaint:   Chief Complaint   Patient presents with    Follow-up        HPI / Summary:   Kilye Ward is a 69 y.o. female presents for followup. Seen in collaboration with Dr. Muniz. She is overall feeling well. She has been exercising regularly at the gym using the elliptical, treadmill, or stationary bike for 45 minutes three times a week. She has been doing Pilates once a week. Denies chest pain, orthopnea, pnd, lightheadedness, dizziness, syncope, palpitations, lower extremity edema, or bleeding issues.     She has been monitoring her blood pressure at home. Reports blood pressure has been 130 to 140/80 to 90. She will sometimes get systolic blood pressures in the 1 teens or 120s.          Past Medical History:  Past Medical History:   Diagnosis Date    ARMD (age related macular degeneration)     Ascending aorta dilation (CMS-HCC)     Crocker's esophagus     Breast cancer (Multi) 2010    s/p lumpectomy and sentinel lymph node biopsy chemotherapy and radiation.    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    Mass of breast 12/08/2024    Melanoma of nose (Multi)     Murmur     Neck pain 12/08/2024    Nipple discharge in female 12/08/2024    Pain of right breast 12/08/2024    Personal history of irradiation     PONV (postoperative nausea and vomiting)     Primary malignant neoplasm of right female breast 12/08/2024    PVD (posterior vitreous detachment), both eyes     Shoulder pain 12/08/2024    Vitamin D deficiency   "       Past Surgical History:  Past Surgical History:   Procedure Laterality Date    ASCENDING AORTIC ANEURYSM REPAIR W/ TISSUE AORTIC VALVE REPLACEMENT      BREAST LUMPECTOMY Bilateral     CARDIAC CATHETERIZATION N/A 09/27/2024    Procedure: Left Heart Cath with Coronary Angiography and LV;  Surgeon: Raul Muniz MD;  Location: [facility];  Service: Cardiovascular;  Laterality: N/A;    COLONOSCOPY      ESOPHAGOGASTRODUODENOSCOPY  08/2023          Social History:   reports that she has never smoked. She has never used smokeless tobacco. She reports current alcohol use of about 4.0 standard drinks of alcohol per week. She reports that she does not use drugs.     Family History:  family history includes Breast cancer in her mother and sister; Prostate cancer in her father.      Allergies:  No Known Allergies    Outpatient Medications:  Current Outpatient Medications   Medication Instructions    acetaminophen (TYLENOL) 650 mg, oral, Every 6 hours PRN    aspirin 81 mg, oral, Daily    atorvastatin (LIPITOR) 10 mg, oral, Nightly    calcitriol (Rocaltrol) 0.25 mcg capsule 1 capsule, Daily    carvedilol (COREG) 6.25 mg, oral, 2 times daily (morning and late afternoon)    dexlansoprazole (DEXILANT) 60 mg, oral, Daily    ergocalciferol (Vitamin D-2) 1.25 MG (27799 UT) capsule 1 capsule (50,000 Units) every 14 (fourteen) days.    famotidine (PEPCID) 40 mg, Daily    mupirocin (Bactroban) 2 % ointment 1 Application, Topical, 3 times daily, Apply to all surgical sites three times a day       Physical Exam:  Vitals:    03/13/25 0906   BP: (!) 159/104   BP Location: Left arm   Patient Position: Sitting   BP Cuff Size: Adult   Pulse: 84   SpO2: 98%   Weight: 64.4 kg (142 lb)   Height: 1.626 m (5' 4\")     Wt Readings from Last 5 Encounters:   03/13/25 64.4 kg (142 lb)   02/06/25 65.8 kg (145 lb)   01/29/25 66.2 kg (145 lb 15.1 oz)   01/21/25 67.1 kg (148 lb)   01/15/25 63.7 kg (140 lb 8 oz)     Body mass index is 24.37 " kg/m².   GENERAL: alert, cooperative, pleasant, in no acute distress  SKIN: warm and dry  NECK: Normal JVD, negative HJR  CARDIAC: Regular rate and rhythm with 2/6 early peaking systolic murmur, no rubs or gallops  CHEST: Normal respiratory efforts, lungs clear to auscultation bilaterally.  ABDOMEN: soft, nontender, nondistended  EXTREMITIES: no edema, +2 palpable RP and PT pulses bilaterally       Last Labs:  Recent Labs     01/21/25  1549 01/08/25  0832 12/06/24  0912   WBC 4.9 4.1* 4.6   HGB 11.6* 11.1* 9.7*   HCT 36.6 34.7* 30.5*    327 388   MCV 90 90 94     Recent Labs     01/08/25  0832 11/25/24  1012 11/19/24  0857    136 135*   K 4.3 4.3 4.0    101 100   BUN 15 16 12   CREATININE 0.80 0.91 0.80     CMP -  Lab Results   Component Value Date    CALCIUM 9.1 01/08/2025    PHOS 4.1 11/25/2024    PROT 7.3 01/08/2025    ALBUMIN 4.0 01/08/2025    AST 24 01/08/2025    ALT 24 01/08/2025    ALKPHOS 103 01/08/2025    BILITOT 0.5 01/08/2025       LIPID PANEL -   Lab Results   Component Value Date    CHOL 154 01/08/2025    HDL 59.5 01/08/2025    LDLF 112 (H) 10/18/2022    TRIG 70 01/08/2025   LDL 81 1/8/25    Lab Results   Component Value Date    HGBA1C 5.6 10/18/2022       Last Cardiology Tests:  ECG:  Reviewed EKG from 12/16/24- normal sinus rhythm HR 75, possible left atrial enlargement, LAD, LVH, non specific T wave abnormality    Echo:  Echo Results:  1/7/25  CONCLUSIONS:   1. The left ventricular systolic function is low normal, with a visually estimated ejection fraction of 50-55%.   2. Left ventricular diastolic filling is indeterminate.   3. Abnormal septal motion consistent with post-operative status.   4. There is normal right ventricular global systolic function.   5. The left atrium is mild to moderately dilated.   6. The right atrium is moderately dilated.   7. Right ventricular systolic pressure is within normal limits.   8. The patient is s/p aortic root replacement and Ascending Aorta  Replacement w/ 28mm straight gelweave on 11/5/2024.   9. The patient is s/p 23mm Avalus Ultra surgical AVR on 11/5/2024. Findings consistent with normal prosthetic aortic valve function.  10. Normal aortic root.  11. Compared with study dated 11/8/2024, there is now a slightly lower LV EF, the tricuspid regurgitation is less severe, the right ventricle appears normal and there is no longer a sizable pleural effusion. The peak and mean gradients through the aortic valve are now mildly lower.         Transthoracic Echo (TTE) Complete With Contrast And Ultromics 11/08/2024    [facility], [address] [place] [address]  Tel [phone] and Fax [phone]    TRANSTHORACIC ECHOCARDIOGRAM REPORT      Patient Name:       DUTCH FITZGERALD      Mira Physician:    [id number] Andrés Armstrong MD  Study Date:         11/8/2024           Ordering Provider:    [id number] JULIANO ROWLAND  MRN/PID:            [id number]            Fellow:  Accession#:         [id number]        Nurse:  Date of Birth/Age:  1955 / 69      Sonographer:          Tahira walton                                     CRESCENCIO  Gender assigned at  F                   Additional Staff:  Birth:  Height:             162.56 cm           Admit Date:           11/5/2024  Weight:             70.76 kg            Admission Status:     Inpatient -  Routine  BSA / BMI:          1.76 m2 / 26.78     Encounter#:           [id number]  kg/m2  Blood Pressure:     105/69 mmHg         Department Location:  [facility]  Non Invasive    Study Type:    TRANSTHORACIC ECHO (TTE) COMPLETE  Diagnosis/ICD: Presence of prosthetic heart valve-Z95.2  Indication:    S/p AVR  CPT Code:      Echo Complete w Full Doppler-[id number]    Patient History:  Pertinent History: HTN, Hyperlipidemia and Previous DVT. s/p Bentall's  procedure, composite replacement of aortic root and ascending  aorta with 2 23 mm Availnex aortic valve and 28 mm Gelweave  graft  11/5/24; Hx of breast canser, s/p bilateral lumpectomy,  chemotherapy.    Study Detail: The following Echo studies were performed: 2D, M-Mode, Doppler and  color flow. Technically challenging study due to body habitus and  postoperative dressings. Definity used as a contrast agent for  endocardial border definition. Total contrast used for this  procedure was 1 mL via IV push.      PHYSICIAN INTERPRETATION:  Left Ventricle: Left ventricular ejection fraction is normal, by visual estimate at 55-60%. There are no regional left ventricular wall motion abnormalities. The left ventricular cavity size is normal. There is mildly increased septal and mildly increased posterior left ventricular wall thickness. There is left ventricular concentric remodeling. Abnormal (paradoxical) septal motion consistent with post-operative status and the interventricular septum is flattened in diastole ('D' shaped left ventricle), consistent with right ventricular volume overload. Spectral Doppler shows a Grade II (pseudonormal pattern) of left ventricular diastolic filling with an elevated left atrial pressure.  Left Atrium: The left atrium is mildly dilated.  Right Ventricle: The right ventricle is mildly enlarged. There is mildly reduced right ventricular systolic function.  Right Atrium: The right atrium is mild to moderately dilated.  Aortic Valve: There is a prosthetic aortic valve present. The aortic valve dimensionless index is 0.75. There is no evidence of aortic valve regurgitation. The peak instantaneous gradient of the aortic valve is 29 mmHg. The mean gradient of the aortic valve is 16 mmHg. S/p Bental procedure.  Mitral Valve: The mitral valve is normal in structure. There is trace to mild mitral valve regurgitation.  Tricuspid Valve: The tricuspid valve is structurally normal. There is moderate tricuspid regurgitation. The Doppler estimated RVSP is within normal limits at 27.6 mmHg.  Pulmonic Valve: The pulmonic valve is  structurally normal. There is physiologic pulmonic valve regurgitation.  Pericardium: Trivial pericardial effusion.  Pleural: There is a moderate left pleural effusion.  Aorta: The aortic root is abnormal. There is a prosthetic graft seen in the position of the ascending aorta. S/p Bentalls procedure.  In comparison to the previous echocardiogram(s): Compared with study dated 11/5/2024, expected post-op changes except the TR.      CONCLUSIONS:  1. Left ventricular ejection fraction is normal, by visual estimate at 55-60%.  2. Spectral Doppler shows a Grade II (pseudonormal pattern) of left ventricular diastolic filling with an elevated left atrial pressure.  3. Abnormal septal motion consistent with post-operative status and right ventricular volume overload.  4. There is mildly reduced right ventricular systolic function.  5. Mildly enlarged right ventricle.  6. The left atrium is mildly dilated.  7. The right atrium is mild to moderately dilated.  8. There is a moderate pleural effusion.  9. Moderate tricuspid regurgitation visualized.  10. Right ventricular systolic pressure is within normal limits.  11. Prosthetic graft in the ascending aorta position.  12. Compared with study dated 11/5/2024, expected post-op changes except the TR.    RECOMMENDATIONS:  Utilizing an FDA cleared automated machine learning algorithm (EchoGo Heart Failure by [employer]), the analysis of the apical 4-chamber echocardiogram suggests the presence of heart failure with preserved ejection fraction (HFpEF)*. Clinical correlation looking for additional heart failure signs and symptoms is recommended, as a definite diagnosis of heart failure cannot be made by imaging alone.  *Per ACC/AHA/HFSA universal diagnosis of heart failure, HFpEF is defined as 1) signs and symptoms leading to clinical diagnosis of heart failure, 2) an ejection fraction of at least 50%, and 3) evidence of elevated intra-cardiac filling pressures by echocardiography,  BNP elevation, or catheterization.    QUANTITATIVE DATA SUMMARY:    2D MEASUREMENTS:          Normal Ranges:  IVSd:            1.20 cm  (0.6-1.1cm)  LVPWd:           1.20 cm  (0.6-1.1cm)  LVIDd:           3.70 cm  (3.9-5.9cm)  LVIDs:           2.60 cm  LV Mass Index:   84 g/m2  LVEDV Index:     60 ml/m2  LV % FS          29.7 %      LA VOLUME:                   Normal Ranges:  LA Vol A4C:        64.1 ml   (22+/-6mL/m2)  LA Vol Index A4C:  36.4ml/m2  LA Area A4C:       20.5 cm2  LA Major Axis A4C: 5.6 cm      RA VOLUME BY A/L METHOD:          Normal Ranges:  RA Area A4C:             22.4 cm2      AORTA MEASUREMENTS:         Normal Ranges:  Asc Ao, d:          3.31 cm (2.1-3.4cm)      LV SYSTOLIC FUNCTION BY 2D PLANIMETRY (MOD):  Normal Ranges:  EF-A4C View:    51 % (>=55%)  EF-A2C View:    49 %  EF-Biplane:     50 %  EF-Visual:      58 %  LV EF Reported: 58 %      LV DIASTOLIC FUNCTION:             Normal Ranges:  MV Peak E:             1.07 m/s    (0.7-1.2 m/s)  MV Peak A:             0.86 m/s    (0.42-0.7 m/s)  E/A Ratio:             1.24        (1.0-2.2)  MV e'                  0.056 m/s   (>8.0)  MV lateral e'          0.05 m/s  MV medial e'           0.06 m/s  MV A Dur:              140.00 msec  E/e' Ratio:            19.09       (<8.0)      MITRAL VALVE:          Normal Ranges:  MV DT:        235 msec (150-240msec)      AORTIC VALVE:                      Normal Ranges:  AoV Vmax:                2.69 m/s  (<=1.7m/s)  AoV Peak P.9 mmHg (<20mmHg)  AoV Mean P.0 mmHg (1.7-11.5mmHg)  LVOT Max Sathish:            2.14 m/s  (<=1.1m/s)  AoV VTI:                 59.10 cm  (18-25cm)  LVOT VTI:                44.60 cm  LVOT Diameter:           2.20 cm   (1.8-2.4cm)  AoV Area, VTI:           2.87 cm2  (2.5-5.5cm2)  AoV Area,Vmax:           3.02 cm2  (2.5-4.5cm2)  AoV Dimensionless Index: 0.75      RIGHT VENTRICLE:  RV Basal 4.40 cm  RV Mid   3.30 cm  RV Major 6.7 cm  TAPSE:   15.1 mm  RV s'     0.09 m/s      TRICUSPID VALVE/RVSP:          Normal Ranges:  Peak TR Velocity:     2.48 m/s  RV Syst Pressure:     28 mmHg  (< 30mmHg)      PULMONIC VALVE:          Normal Ranges:  PV Accel Time:  127 msec (>120ms)  PV Max Sathish:     1.3 m/s  (0.6-0.9m/s)  PV Max P.0 mmHg      [id number] Andrés Armstrong MD  Electronically signed on 2024 at 1:56:20 PM        ** Final **      Transthoracic Echo (TTE) Complete 2024    [facility], [address] [address]  Tel [phone] and Fax [phone]    TRANSTHORACIC ECHOCARDIOGRAM REPORT      Patient Name:      DUTCH Meyers Physician:    [id number] Fortino Marcelino MD  Study Date:        2024            Ordering Provider:    [id number] RAHEL BENJAMIN  MRN/PID:           [id number]             Fellow:  Accession#:        [id number]         Nurse:  Date of Birth/Age: 1955 / 69 years Sonographer:          [place] AYALA  Gender:            F                    Additional Staff:  Height:            162.56 cm            Admit Date:  Weight:            68.04 kg             Admission Status:     Outpatient  BSA / BMI:         1.73 m2 / 25.75      Encounter#:           [id number]  kg/m2  Department Location:  [facility]  Blood Pressure: 167 /95 mmHg    Study Type:    TRANSTHORACIC ECHO (TTE) COMPLETE  Diagnosis/ICD: Ascending aorta dilatation-I77.810; Nonrheumatic aortic (valve)  insufficiency-I35.1  Indication:    Ascending Ao dilation, AI  CPT Code:      Echo Complete w Full Doppler-[id number]    Patient History:  Pertinent History: Ascending Ao dilation, AI, GERD, Hyperthyroidism,  Diverticular disease, short- segment Crocker's esophagus, H/o  Breast CA, h/o CHEMO and Radiation (, ), murmur.    Study Detail: The following Echo studies were performed: 2D, M-Mode, Doppler and  color flow.      PHYSICIAN INTERPRETATION:  Left Ventricle: The left ventricular systolic function is low normal, with an  estimated ejection fraction of 50-55%. There are no regional wall motion abnormalities. The left ventricular cavity size is normal. Spectral Doppler shows an impaired relaxation pattern of left ventricular diastolic filling.  Left Atrium: The left atrium is mild to moderately dilated.  Right Ventricle: The right ventricle is normal in size. There is normal right ventricular global systolic function.  Right Atrium: The right atrium is normal in size.  Aortic Valve: The aortic valve appears structurally normal. There is evidence of mild aortic valve stenosis.  There is mild aortic valve regurgitation. The peak instantaneous gradient of the aortic valve is 22.0 mmHg. The mean gradient of the aortic valve is 10.1 mmHg.  Mitral Valve: The mitral valve is normal in structure. There is trace to mild mitral valve regurgitation.  Tricuspid Valve: The tricuspid valve is structurally normal. There is mild tricuspid regurgitation. The Doppler estimated RVSP is mildly elevated at 33.6 mmHg.  Pulmonic Valve: The pulmonic valve is structurally normal. There is trace pulmonic valve regurgitation.  Pericardium: There is a trivial pericardial effusion.  Aorta: The aortic root is abnormal. There is moderate dilatation of the ascending aorta. There is mild dilatation of the aortic root.  Systemic Veins: The inferior vena cava appears to be of normal size. There is IVC inspiratory collapse greater than 50%.  In comparison to the previous echocardiogram(s): There are no prior studies on this patient for comparison purposes.      CONCLUSIONS:  1. Left ventricular systolic function is low normal with a 50-55% estimated ejection fraction.  2. Spectral Doppler shows an impaired relaxation pattern of left ventricular diastolic filling.  3. The left atrium is mild to moderately dilated.  4. Mildly elevated RVSP.  5. Mild aortic valve stenosis.  6. Mild aortic valve regurgitation.  7. There is moderate dilatation of the ascending  aorta.    QUANTITATIVE DATA SUMMARY:  2D MEASUREMENTS:  Normal Ranges:  Ao Root d:     3.80 cm   (2.0-3.7cm)  LAs:           3.79 cm   (2.7-4.0cm)  IVSd:          0.90 cm   (0.6-1.1cm)  LVPWd:         0.77 cm   (0.6-1.1cm)  LVIDd:         4.24 cm   (3.9-5.9cm)  LVIDs:         3.51 cm  LV Mass Index: 63.1 g/m2  LV % FS        17.3 %    LA VOLUME:  Normal Ranges:  LA Vol A4C:        81.6 ml    (22+/-6mL/m2)  LA Vol A2C:        133.9 ml  LA Vol BP:         108.8 ml  LA Vol Index A4C:  47.1 ml/m2  LA Vol Index A2C:  77.4 ml/m2  LA Vol Index BP:   62.9 ml/m2  LA Area A4C:       24.0 cm2  LA Area A2C:       32.0 cm2  LA Major Axis A4C: 6.0 cm  LA Major Axis A2C: 6.5 cm  LA Volume Index:   62.0 ml/m2  LA Vol A4C:        78.7 ml  LA Vol A2C:        123.3 ml    RA VOLUME BY A/L METHOD:  Normal Ranges:  RA Vol A4C:        60.7 ml    (8.3-19.5ml)  RA Vol Index A4C:  35.1 ml/m2  RA Area A4C:       20.0 cm2  RA Major Axis A4C: 5.6 cm    M-MODE MEASUREMENTS:  Normal Ranges:  Ao Root: 4.20 cm (2.0-3.7cm)  LAs:     3.32 cm (2.7-4.0cm)    AORTA MEASUREMENTS:  Normal Ranges:  Asc Ao, d: 4.50 cm (2.1-3.4cm)  Ao Arch:   3.40 cm (2.0-3.6cm)    LV SYSTOLIC FUNCTION BY 2D PLANIMETRY (MOD):  Normal Ranges:  EF-A4C View: 59.1 % (>=55%)  EF-A2C View: 54.3 %  EF-Biplane:  56.2 %    LV DIASTOLIC FUNCTION:  Normal Ranges:  MV Peak E:        0.52 m/s    (0.7-1.2 m/s)  MV Peak A:        1.09 m/s    (0.42-0.7 m/s)  E/A Ratio:        0.47        (1.0-2.2)  MV e'             0.06 m/s    (>8.0)  MV lateral e'     0.11 m/s  MV medial e'      0.09 m/s  MV A Dur:         125.59 msec  E/e' Ratio:       7.98        (<8.0)  a'                0.14 m/s  PulmV Sys Sathish:    55.13 cm/s  PulmV Carmona Sathish:   35.20 cm/s  PulmV S/D Sathish:    1.57  PulmV A Revs Sathish: 26.43 cm/s  PulmV A Revs Dur: 104.66 msec    MITRAL VALVE:  Normal Ranges:  MV DT: 148 msec (150-240msec)    MITRAL INSUFFICIENCY:  Normal Ranges:  MR VTI:  252.95 cm  MR Vmax: 641.27 cm/s    AORTIC  VALVE:  Normal Ranges:  AoV Vmax:                2.34 m/s  (<=1.7m/s)  AoV Peak P.0 mmHg (<20mmHg)  AoV Mean PG:             10.1 mmHg (1.7-11.5mmHg)  LVOT Max Sathish:            1.53 m/s  (<=1.1m/s)  AoV VTI:                 50.03 cm  (18-25cm)  LVOT VTI:                31.38 cm  LVOT Diameter:           2.05 cm   (1.8-2.4cm)  AoV Area, VTI:           2.08 cm2  (2.5-5.5cm2)  AoV Area,Vmax:           2.16 cm2  (2.5-4.5cm2)  AoV Dimensionless Index: 0.63    AORTIC INSUFFICIENCY:  AI Vmax:       5.21 m/s  AI Half-time:  383 msec  AI Decel Time: 1322 msec  AI Decel Rate: 396.39 cm/s2      RIGHT VENTRICLE:  RV Basal 3.90 cm  RV Mid   2.50 cm  RV Major 8.5 cm  TAPSE:   23.7 mm  RV s'    0.18 m/s    TRICUSPID VALVE/RVSP:  Normal Ranges:  Peak TR Velocity: 2.77 m/s  RV Syst Pressure: 33.6 mmHg (< 30mmHg)  IVC Diam:         1.30 cm    PULMONIC VALVE:  Normal Ranges:  PV Accel Time: 140 msec (>120ms)  PV Max Sathish:    1.0 m/s  (0.6-0.9m/s)  PV Max P.0 mmHg    Pulmonary Veins:  PulmV A Revs Dur: 104.66 msec  PulmV A Revs Sathish: 26.43 cm/s  PulmV Carmona Sathish:   35.20 cm/s  PulmV S/D Sathish:    1.57  PulmV Sys Sathish:    55.13 cm/s      [id number] Fortino Marcelino MD  Electronically signed on 2024 at 10:25:09 PM        ** Final **         Cath:  24  CONCLUSIONS:   1. Minimal luminal irregularities in a right dominant system.    Stress Test:  Stress Results:  No results found for this or any previous visit from the past 365 days.         Cardiac Imagin24  IMPRESSION:  1.  Small bilateral pleural effusions which appear greater on the  left than right.  2. Probable subsegmental atelectasis left lower lobe behind the heart.  3. Moderate cardiomegaly, underlying pericardial effusion not  excluded.    CTA chest 25  IMPRESSION:  1. Status post Bentall procedure with expected postsurgical changes  and no evidence of anastomotic site leakage.  2. There is no evidence for acute aortic pathology.  3. There is no  significant calcific atherosclerosis of the coronary  vessels. The visualized portions of the ostial coronary vessels which  were reimplanted are overall intact. Note that this study was not  optimized for coronary vessel evaluation.  4. Mildly dilated main pulmonary artery. Correlate/concern for  elevated right-sided filling pressures.        Assessment/Plan   Kiley was seen today for follow-up.  Diagnoses and all orders for this visit:  S/P ascending aortic aneurysm repair (Primary)  Primary hypertension  -     carvedilol (Coreg) 6.25 mg tablet; Take 2 tablets (12.5 mg) by mouth 2 times daily (morning and late afternoon).  Hyperlipidemia, unspecified hyperlipidemia type  S/P aortic aneurysm repair  S/P AVR  -     amoxicillin (Amoxil) 500 mg capsule; Take 4 caps (2000 mg) 30 to 60 minutes prior to dental cleanings or procedures  -     Transthoracic Echo (TTE) Complete; Future        In summary Ms. Ward is a pleasant 69-year-old white female with a past medical history significant for ascending aortic aneurysm with a trileaflet aortic valve s/p aorta and aortic replacement and aortic valve replacement, hyperlipidemia with a CT calcium score of 0 in 2024, prior left upper extremity DVT, and bilateral breast cancer status post prior Herceptin. She was hospitalized in November at which time she underwent composite replacement of aortic root and ascending aorta with 2 23 mm Availnex aortic valve and 28 mm Gelweave graft, reimplantation of coronary arteries, and posterior pericardiotomy. Hospitalization was uncomplicated. Her blood pressure is elevated today and has been such at home. I increased Carvedilol to 12.5 mg twice a day. She is euvolemic by clinical exam. Recent lipid panel at goal. I did educate her that she benefits for antibiotics prior to any dental cleanings or procedures. She will follow up with her primary care physician about iron supplement as she has had difficulties tolerating. She will continue  current cardiovascular medications. We will see her back in follow-up in November with an echocardiogram.      Orders:  No orders of the defined types were placed in this encounter.     Followup Appts:  Future Appointments   Date Time Provider Department Center   4/3/2025  8:45 AM Leon Ramirez MD [id number] Academic   4/28/2025  8:45 AM Indra Torres MD [id number] [facility]   5/7/2025  9:30 AM Duke Shipman MD [id number] [facility]   8/19/2025  9:30 AM [facility]I [facility]   8/19/2025 10:30 AM YOSVANY Baird-CNP [id number]           ____________________________________________________________  LEONIDES Calvo  [facility]  [facility]

## 2025-03-20 DIAGNOSIS — N63.42 UNSPECIFIED LUMP IN LEFT BREAST, SUBAREOLAR: ICD-10-CM

## 2025-03-20 DIAGNOSIS — N64.9 DISORDER OF BREAST, UNSPECIFIED: ICD-10-CM

## 2025-03-20 DIAGNOSIS — N63.20 MASS OF LEFT BREAST, UNSPECIFIED QUADRANT: ICD-10-CM

## 2025-03-31 NOTE — PROGRESS NOTES
Kiley Ward female   1955 70 y.o.   [id number]      Chief Complaint   left breast mass, history bilateral breast cancer     History Of Present Illness  Kiley Ward is a very pleasant 70 y.o.  female presenting with left palpable mass. Reports the left breast mass that has been previously evaluated felt different in size and shape. Denies trauma. History of right invasive ductal carcinoma triple positive treated at [place] in  s/p lumpectomy and sentinel lymph node biopsy, neoadjuvant chemotherapy and radiation. History left invasive ductal carcinoma ER/PER+  treated at [place] in 2014 s/p lumpectomy and sentinel lymph node biopsy, chemotherapy and radiation. 2014 genetic testing and is BRCA1 and BRCA2 negative. In  she had a bilateral breast reduction. She has family history breast cancer in mother and sister.      BREAST IMAGIN2024 RIGHT diagnostic mammogram and right limited breast ultrasound, BI-RADS Category 2. 2025 LEFT diagnostic mammogram and ultrasound BI-RADS Category 2. The palpable lump of the left breast corresponds to a stable mass previously evaluated. There has been no significant interval change  in size. Annual bilateral mammogram is due 2025.    REPRODUCTIVE HISTORY: menarche age 14, , first birth age 31,  4 months, no OCP's, chemotherapy induced menopause age 57,  no HRT, heterogeneously dense breast tissue                                   FAMILY CANCER HISTORY:    Mother: Breast cancer, age 60  Sister: Breast cancer, age 49 negative genetics   Father: Prostate cancer age 65    Surgical History  She has a past surgical history that includes Breast lumpectomy (Bilateral); Cardiac catheterization (N/A, 2024); Esophagogastroduodenoscopy (2023); Colonoscopy; and Ascending aortic aneurysm repair w/ tissue aortic valve replacement.     Social History  She reports that she has never smoked. She has never used smokeless tobacco.  She reports current alcohol use of about 4.0 standard drinks of alcohol per week. She reports that she does not use drugs.    Family History  Family History   Problem Relation Name Age of Onset    Breast cancer Mother      Prostate cancer Father      Breast cancer Sister      Glaucoma Neg Hx          Allergies  Patient has no known allergies.    Medications  Current Outpatient Medications   Medication Instructions    acetaminophen (TYLENOL) 650 mg, oral, Every 6 hours PRN    aspirin 81 mg, oral, Daily    atorvastatin (LIPITOR) 10 mg, oral, Nightly    calcitriol (Rocaltrol) 0.25 mcg capsule 1 capsule, Daily    carvedilol (COREG) 12.5 mg, oral, 2 times daily (morning and late afternoon)    dexlansoprazole (DEXILANT) 60 mg, oral, Daily    ergocalciferol (Vitamin D-2) 1.25 MG (08785 UT) capsule 1 capsule (50,000 Units) every 14 (fourteen) days.    famotidine (PEPCID) 40 mg, Daily    mupirocin (Bactroban) 2 % ointment 1 Application, Topical, 3 times daily, Apply to all surgical sites three times a day         REVIEW OF SYSTEMS    Constitutional:  Negative for appetite change, fatigue, fever and unexpected weight change.   HENT:  Negative for ear pain, hearing loss, nosebleeds, sore throat and trouble swallowing.    Eyes:  Negative for discharge, itching and visual disturbance.   Respiratory:  Negative for cough, chest tightness and shortness of breath.    Cardiovascular:  Negative for chest pain, palpitations and leg swelling.   Breast: as indicated in HPI  Gastrointestinal:  Negative for abdominal pain, constipation, diarrhea and nausea.   Endocrine: Negative for cold intolerance and heat intolerance.   Genitourinary:  Negative for dysuria, frequency, hematuria, pelvic pain and vaginal bleeding.   Musculoskeletal:  Negative for arthralgias, back pain, gait problem, joint swelling and myalgias.   Skin:  Negative for color change and rash.   Allergic/Immunologic: Negative for environmental allergies and food allergies.    Neurological:  Negative for dizziness, tremors, speech difficulty, weakness, numbness and headaches.   Hematological:  Does not bruise/bleed easily.   Psychiatric/Behavioral:  Negative for agitation, dysphoric mood and sleep disturbance. The patient is not nervous/anxious.         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     Physical Exam  Patient is alert and oriented x3 and in a relaxed and appropriate mood. Her gait is steady and hand grasps are equal. Sclera is clear. The breasts are asymmetrical, left larger. The right breast well healed superolateral lumpectomy incision and pedicle method incision. Right axilla well healed incision. Left breast well healed pedicle method incision and superolateral lumpectomy incision. Left axilla well healed surgical incision. Left breast 5:00 5 cm from the nipple, 2 x 2 cm palpable mass. Left breast skin with telangiectasias secondary to radiation. The tissue is soft without palpable abnormalities, discrete nodules or masses. The skin and nipples appear normal. There is no cervical, supraclavicular or axillary lymphadenopathy. Heart rate and rhythm normal, S1  and S2 appreciated. The lungs are clear to auscultation bilaterally. Abdomen is soft and non-tender.       Physical Exam  Chest:              Last Recorded Vitals  Vitals:    04/08/25 0947   BP: (!) 147/93   Pulse: 66   Temp: 37.1 °C (98.8 °F)   SpO2: 97%         Relevant Results   Time was spent viewing digital images of the radiology testing with the patient. I explained the results in depth, along with suggested explanation for follow up recommendations based on the testing results. BI-RADS Category 2    Imaging  Interpreted By:  Shell Pace,   STUDY:  BI US BREAST LIMITED LEFT; BI MAMMO LEFT DIAGNOSTIC TOMOSYNTHESIS;  4/8/2025 9:31 am; 4/8/2025 9:20 am      ACCESSION NUMBER(S):  [id number]; [id number]      ORDERING CLINICIAN:  JASS MCNEIL      INDICATION:  Palpable mass in the left breast. History of left lumpectomy with  radiation in bilateral breast reduction.      ,N63.20 Unspecified lump in the left breast, unspecified  quadrant,N64.9 Disorder of breast, unspecified; ,N63.20 Unspecified  lump in the left breast, unspecified quadrant,N63.42 Unspecified lump  in left breast, subareolar      COMPARISON:  08/13/2024, 02/05/2024, 02/02/2023.      FINDINGS:  MAMMOGRAPHY: 2D and tomosynthesis images were reviewed at 1 mm slice  thickness.      Density:  The breasts are heterogeneously dense, which may obscure  small masses.      A metallic radiopaque marker was placed by the patient at the site of  the focal lump in the central inferior left breast; a stable  circumscribed mass with coarse calcifications is seen deep to the  radiopaque marker.      ULTRASOUND: Targeted ultrasound was performed of the left breast by a  registered sonographer with elastography at the area of palpable  lump. At 4 o'clock 4 cm from the nipple, an oval circumscribed  hypoechoic mass with internal echogenic foci is seen measuring 1.6 x  1.0 x 1.7 cm. There is no internal vascularity. Elastography  characteristics are limited  due to its superficial location. This  mass is stable compared to the ultrasound on 08/13/2024 where it  measured 1.7 x 0.9 x 1.6 cm. Additionally, it appears  mammographically stable dating back to at least 11/10/2021.      IMPRESSION:  The palpable lump of the left breast corresponds to a stable mass  previously evaluated. There has been no significant interval change  in size. Annual bilateral mammogram is due 08/2025.      BI-RADS CATEGORY:  BI-RADS Category:  2 Benign.  Recommendation:  Annual Screening.  Recommended Date:  4 Months.  Laterality:  Bilateral.       Assessment/Plan       PLAN:  Normal clinical exam and imaging, bilateral breast cancer, heterogeneously dense breast tissue.        Patient Discussion/Summary  Your clinical examination and imaging are normal. Please return in August 2025 for bilateral screening mammogram and office visit or sooner if you have any problems or concerns.     You can see your health information, review clinical summaries from office visits & test results online when you follow your health with MY  Chart, a personal health record. To sign up go to [email or web address]. If you need assistance with signing up or trouble getting into your account call [email or web address] Patient Line 24/7 at [phone].    My office phone number is [phone] if you need to get in touch with me or have additional questions or concerns. Thank you for choosing [facility] and trusting me as your healthcare provider. I look forward to seeing you again at your next office visit. I am honored to be a provider on your health care team and I remain dedicated to helping you achieve your health goals.      Nicky Mayers, YOSVANY-CNP

## 2025-04-03 ENCOUNTER — APPOINTMENT (OUTPATIENT)
Dept: OPHTHALMOLOGY | Facility: CLINIC | Age: 70
End: 2025-04-03
Payer: MEDICARE

## 2025-04-08 ENCOUNTER — OFFICE VISIT (OUTPATIENT)
Dept: SURGICAL ONCOLOGY | Facility: CLINIC | Age: 70
End: 2025-04-08
Payer: MEDICARE

## 2025-04-08 ENCOUNTER — HOSPITAL ENCOUNTER (OUTPATIENT)
Dept: RADIOLOGY | Facility: CLINIC | Age: 70
Discharge: HOME | End: 2025-04-08
Payer: MEDICARE

## 2025-04-08 VITALS
TEMPERATURE: 98.8 F | DIASTOLIC BLOOD PRESSURE: 93 MMHG | WEIGHT: 150.8 LBS | OXYGEN SATURATION: 97 % | SYSTOLIC BLOOD PRESSURE: 147 MMHG | HEART RATE: 66 BPM | BODY MASS INDEX: 25.88 KG/M2

## 2025-04-08 DIAGNOSIS — Z85.3 HISTORY OF MALIGNANT NEOPLASM OF BREAST: ICD-10-CM

## 2025-04-08 DIAGNOSIS — N63.20 MASS OF LEFT BREAST, UNSPECIFIED QUADRANT: ICD-10-CM

## 2025-04-08 DIAGNOSIS — R92.333 HETEROGENEOUSLY DENSE TISSUE OF BOTH BREASTS ON MAMMOGRAPHY: Primary | ICD-10-CM

## 2025-04-08 DIAGNOSIS — N63.42 UNSPECIFIED LUMP IN LEFT BREAST, SUBAREOLAR: ICD-10-CM

## 2025-04-08 DIAGNOSIS — N64.9 DISORDER OF BREAST, UNSPECIFIED: ICD-10-CM

## 2025-04-08 PROCEDURE — 76642 ULTRASOUND BREAST LIMITED: CPT | Mod: LT

## 2025-04-08 PROCEDURE — 1159F MED LIST DOCD IN RCRD: CPT

## 2025-04-08 PROCEDURE — 99214 OFFICE O/P EST MOD 30 MIN: CPT

## 2025-04-08 PROCEDURE — 99214 OFFICE O/P EST MOD 30 MIN: CPT | Mod: 25

## 2025-04-08 PROCEDURE — 1036F TOBACCO NON-USER: CPT

## 2025-04-08 PROCEDURE — 1126F AMNT PAIN NOTED NONE PRSNT: CPT

## 2025-04-08 PROCEDURE — 3080F DIAST BP >= 90 MM HG: CPT

## 2025-04-08 PROCEDURE — 3077F SYST BP >= 140 MM HG: CPT

## 2025-04-08 PROCEDURE — 1157F ADVNC CARE PLAN IN RCRD: CPT

## 2025-04-08 PROCEDURE — 77065 DX MAMMO INCL CAD UNI: CPT | Mod: LT

## 2025-04-08 PROCEDURE — G0279 TOMOSYNTHESIS, MAMMO: HCPCS | Mod: LEFT SIDE | Performed by: STUDENT IN AN ORGANIZED HEALTH CARE EDUCATION/TRAINING PROGRAM

## 2025-04-08 PROCEDURE — 77065 DX MAMMO INCL CAD UNI: CPT | Mod: LEFT SIDE | Performed by: STUDENT IN AN ORGANIZED HEALTH CARE EDUCATION/TRAINING PROGRAM

## 2025-04-08 PROCEDURE — 76642 ULTRASOUND BREAST LIMITED: CPT | Mod: LEFT SIDE | Performed by: STUDENT IN AN ORGANIZED HEALTH CARE EDUCATION/TRAINING PROGRAM

## 2025-04-08 PROCEDURE — 76982 USE 1ST TARGET LESION: CPT

## 2025-04-08 ASSESSMENT — PAIN SCALES - GENERAL: PAINLEVEL_OUTOF10: 0-NO PAIN

## 2025-04-08 NOTE — PATIENT INSTRUCTIONS
Your clinical examination and imaging are normal. Please return in August 2025 for bilateral screening mammogram and office visit or sooner if you have any problems or concerns.     You can see your health information, review clinical summaries from office visits & test results online when you follow your health with MY  Chart, a personal health record. To sign up go to [email or web address]. If you need assistance with signing up or trouble getting into your account call [employer] Patient Line 24/7 at [phone].    My office phone number is [phone] if you need to get in touch with me or have additional questions or concerns. Thank you for choosing [facility] and trusting me as your healthcare provider. I look forward to seeing you again at your next office visit. I am honored to be a provider on your health care team and I remain dedicated to helping you achieve your health goals.

## 2025-04-14 ENCOUNTER — TELEPHONE (OUTPATIENT)
Dept: CARDIAC REHAB | Facility: CLINIC | Age: 70
End: 2025-04-14

## 2025-04-14 ENCOUNTER — APPOINTMENT (OUTPATIENT)
Dept: PRIMARY CARE | Facility: CLINIC | Age: 70
End: 2025-04-14
Payer: MEDICARE

## 2025-04-14 NOTE — TELEPHONE ENCOUNTER
Kiley Ward  04/14/25    Called patient to schedule start date for the rehab sessions for cardiac rehab at [facility].  Pt stated that she is not interested at this time.  She is already exercising at the  gym.  Her provider is aware.    Ruthie Cheek RN

## 2025-04-25 DIAGNOSIS — I71.21 ANEURYSM OF ASCENDING AORTA WITHOUT RUPTURE: ICD-10-CM

## 2025-04-25 RX ORDER — ATORVASTATIN CALCIUM 10 MG/1
10 TABLET, FILM COATED ORAL NIGHTLY
Qty: 30 TABLET | Refills: 0 | Status: SHIPPED | OUTPATIENT
Start: 2025-04-25

## 2025-04-28 ENCOUNTER — APPOINTMENT (OUTPATIENT)
Dept: OPHTHALMOLOGY | Age: 70
End: 2025-04-28
Payer: MEDICARE

## 2025-04-28 DIAGNOSIS — C50.911: ICD-10-CM

## 2025-04-28 DIAGNOSIS — H35.30 ARMD (AGE RELATED MACULAR DEGENERATION): Primary | ICD-10-CM

## 2025-04-28 DIAGNOSIS — H35.3124 NONEXUDATIVE AGE-RELATED MACULAR DEGENERATION, LEFT EYE, ADVANCED ATROPHIC WITH SUBFOVEAL INVOLVEMENT: ICD-10-CM

## 2025-04-28 DIAGNOSIS — H43.813 PVD (POSTERIOR VITREOUS DETACHMENT), BOTH EYES: ICD-10-CM

## 2025-04-28 PROCEDURE — 92134 CPTRZ OPH DX IMG PST SGM RTA: CPT | Performed by: OPHTHALMOLOGY

## 2025-04-28 PROCEDURE — 99213 OFFICE O/P EST LOW 20 MIN: CPT | Performed by: OPHTHALMOLOGY

## 2025-04-28 ASSESSMENT — PACHYMETRY
OD_CT(UM): 584
OS_CT(UM): 597

## 2025-04-28 ASSESSMENT — EXTERNAL EXAM - LEFT EYE: OS_EXAM: NORMAL

## 2025-04-28 ASSESSMENT — VISUAL ACUITY
OD_SC: 20/30+2
OS_SC: 20/30
METHOD: SNELLEN - LINEAR

## 2025-04-28 ASSESSMENT — TONOMETRY
OS_IOP_MMHG: 14
OD_IOP_MMHG: 14
IOP_METHOD: GOLDMANN APPLANATION

## 2025-04-28 ASSESSMENT — CUP TO DISC RATIO
OD_RATIO: 0.3
OS_RATIO: 0.5

## 2025-04-28 ASSESSMENT — SLIT LAMP EXAM - LIDS
COMMENTS: NORMAL
COMMENTS: NORMAL

## 2025-04-28 ASSESSMENT — ENCOUNTER SYMPTOMS: EYES NEGATIVE: 1

## 2025-04-28 ASSESSMENT — EXTERNAL EXAM - RIGHT EYE: OD_EXAM: NORMAL

## 2025-04-28 NOTE — PROGRESS NOTES
Assessment/Plan   Diagnoses and all orders for this visit:  ARMD (age related macular degeneration)  -     OCT, Retina - OU - Both Eyes  PVD (posterior vitreous detachment), both eyes  Nonexudative age-related macular degeneration, left eye, advanced atrophic with subfoveal involvement  Primary malignant neoplasm of right female breast    No history of checkpoint inhibitors  Had breast cancer    1. Importance of avoiding situations of risk for eye injury and of routine use of appropriate safety eye protection discussed with patient and emphasized.  2. Diet and lifestyle precautions regarding age-related macular degeneration discussed.  Have recommended AREDS 2 vitamins, refraining from smoking, UV protection, and self-monitoring of vision. Patient advised to seek ophthalmic follow-up promptly if there are significant changes in vision.  3. Follow up retina        DIAGNOSTIC PROCEDURE DONE    OCT DONE OD/OS            REASON FOR TEST: will help address and tailor  therapy by detecting subclinical CME SRF     Hi quality OCT  scans obtained  signal good    OCT OD - Normal Foveal Contour, No Edema, IS/OS Junction Normal Erm  OCT OS - Normal Foveal Contour, No Edema, IS/OS Junction Normal erm    additional commnents: lamellar defect ou    She has few drusen in both eyes as well as fine epiretinal membrane (ERM) in both eyes  Vision is excellent  Follow up 6 months

## 2025-04-30 DIAGNOSIS — K21.9 GASTROESOPHAGEAL REFLUX DISEASE, UNSPECIFIED WHETHER ESOPHAGITIS PRESENT: ICD-10-CM

## 2025-04-30 RX ORDER — DEXLANSOPRAZOLE 60 MG/1
1 CAPSULE, DELAYED RELEASE ORAL DAILY
Qty: 90 CAPSULE | Refills: 1 | Status: SHIPPED | OUTPATIENT
Start: 2025-04-30

## 2025-05-02 NOTE — PROGRESS NOTES
Facial Plastic & Reconstructive Surgery      POV s/p STAGE 2 forehead flap takedown on 2/25/25    Doing well, endorses improved swelling and pain  Continues ointment to prior surgical sites    Incisions c/d/I    0.2 kenalog 10 was injected at right nasal side wall  Patient tolerated well    PHOTOS taken by GC    Plan:  Continue wound care as instructed TID   RTC 4-6 months or sooner if needed     Shannan Garrett PA-C

## 2025-05-07 ENCOUNTER — APPOINTMENT (OUTPATIENT)
Dept: OTOLARYNGOLOGY | Facility: CLINIC | Age: 70
End: 2025-05-07
Payer: MEDICARE

## 2025-05-07 VITALS — BODY MASS INDEX: 25.75 KG/M2 | WEIGHT: 150 LBS

## 2025-05-07 DIAGNOSIS — L90.5 SCAR OF FOREHEAD: ICD-10-CM

## 2025-05-07 PROCEDURE — 96372 THER/PROPH/DIAG INJ SC/IM: CPT | Performed by: OTOLARYNGOLOGY

## 2025-05-07 PROCEDURE — 1159F MED LIST DOCD IN RCRD: CPT | Performed by: PHYSICIAN ASSISTANT

## 2025-05-07 PROCEDURE — 99024 POSTOP FOLLOW-UP VISIT: CPT | Performed by: PHYSICIAN ASSISTANT

## 2025-05-19 ENCOUNTER — APPOINTMENT (OUTPATIENT)
Dept: PRIMARY CARE | Facility: CLINIC | Age: 70
End: 2025-05-19
Payer: MEDICARE

## 2025-05-19 VITALS
DIASTOLIC BLOOD PRESSURE: 76 MMHG | HEIGHT: 64 IN | OXYGEN SATURATION: 95 % | HEART RATE: 82 BPM | SYSTOLIC BLOOD PRESSURE: 122 MMHG | WEIGHT: 148.8 LBS | BODY MASS INDEX: 25.4 KG/M2

## 2025-05-19 DIAGNOSIS — Z98.890 S/P AORTIC ANEURYSM REPAIR: ICD-10-CM

## 2025-05-19 DIAGNOSIS — E55.9 VITAMIN D DEFICIENCY: ICD-10-CM

## 2025-05-19 DIAGNOSIS — I71.21 ANEURYSM OF ASCENDING AORTA WITHOUT RUPTURE: ICD-10-CM

## 2025-05-19 DIAGNOSIS — Z13.0 SCREENING FOR BLOOD DISEASE: ICD-10-CM

## 2025-05-19 DIAGNOSIS — Z13.1 SCREENING FOR DIABETES MELLITUS: ICD-10-CM

## 2025-05-19 DIAGNOSIS — I10 PRIMARY HYPERTENSION: ICD-10-CM

## 2025-05-19 DIAGNOSIS — Z85.3 HISTORY OF BREAST CANCER IN FEMALE: ICD-10-CM

## 2025-05-19 DIAGNOSIS — Z86.79 S/P AORTIC ANEURYSM REPAIR: ICD-10-CM

## 2025-05-19 DIAGNOSIS — Z95.2 S/P AVR (AORTIC VALVE REPLACEMENT) AND AORTOPLASTY: ICD-10-CM

## 2025-05-19 DIAGNOSIS — Z00.00 HEALTHCARE MAINTENANCE: ICD-10-CM

## 2025-05-19 DIAGNOSIS — Z00.00 ROUTINE GENERAL MEDICAL EXAMINATION AT HEALTH CARE FACILITY: ICD-10-CM

## 2025-05-19 DIAGNOSIS — Z13.6 SCREENING FOR CARDIOVASCULAR CONDITION: ICD-10-CM

## 2025-05-19 DIAGNOSIS — E78.5 HYPERLIPIDEMIA, UNSPECIFIED HYPERLIPIDEMIA TYPE: ICD-10-CM

## 2025-05-19 DIAGNOSIS — Z13.29 SCREENING FOR THYROID DISORDER: ICD-10-CM

## 2025-05-19 DIAGNOSIS — Z00.00 MEDICARE ANNUAL WELLNESS VISIT, SUBSEQUENT: Primary | ICD-10-CM

## 2025-05-19 DIAGNOSIS — K21.9 GASTROESOPHAGEAL REFLUX DISEASE, UNSPECIFIED WHETHER ESOPHAGITIS PRESENT: ICD-10-CM

## 2025-05-19 DIAGNOSIS — C43.31: ICD-10-CM

## 2025-05-19 PROCEDURE — 99397 PER PM REEVAL EST PAT 65+ YR: CPT | Performed by: STUDENT IN AN ORGANIZED HEALTH CARE EDUCATION/TRAINING PROGRAM

## 2025-05-19 PROCEDURE — 1124F ACP DISCUSS-NO DSCNMKR DOCD: CPT | Performed by: STUDENT IN AN ORGANIZED HEALTH CARE EDUCATION/TRAINING PROGRAM

## 2025-05-19 PROCEDURE — 99214 OFFICE O/P EST MOD 30 MIN: CPT | Performed by: STUDENT IN AN ORGANIZED HEALTH CARE EDUCATION/TRAINING PROGRAM

## 2025-05-19 PROCEDURE — 1170F FXNL STATUS ASSESSED: CPT | Performed by: STUDENT IN AN ORGANIZED HEALTH CARE EDUCATION/TRAINING PROGRAM

## 2025-05-19 PROCEDURE — 1036F TOBACCO NON-USER: CPT | Performed by: STUDENT IN AN ORGANIZED HEALTH CARE EDUCATION/TRAINING PROGRAM

## 2025-05-19 PROCEDURE — G0439 PPPS, SUBSEQ VISIT: HCPCS | Performed by: STUDENT IN AN ORGANIZED HEALTH CARE EDUCATION/TRAINING PROGRAM

## 2025-05-19 PROCEDURE — G0446 INTENS BEHAVE THER CARDIO DX: HCPCS | Performed by: STUDENT IN AN ORGANIZED HEALTH CARE EDUCATION/TRAINING PROGRAM

## 2025-05-19 PROCEDURE — 1159F MED LIST DOCD IN RCRD: CPT | Performed by: STUDENT IN AN ORGANIZED HEALTH CARE EDUCATION/TRAINING PROGRAM

## 2025-05-19 PROCEDURE — 3078F DIAST BP <80 MM HG: CPT | Performed by: STUDENT IN AN ORGANIZED HEALTH CARE EDUCATION/TRAINING PROGRAM

## 2025-05-19 PROCEDURE — 3074F SYST BP LT 130 MM HG: CPT | Performed by: STUDENT IN AN ORGANIZED HEALTH CARE EDUCATION/TRAINING PROGRAM

## 2025-05-19 PROCEDURE — 3008F BODY MASS INDEX DOCD: CPT | Performed by: STUDENT IN AN ORGANIZED HEALTH CARE EDUCATION/TRAINING PROGRAM

## 2025-05-19 RX ORDER — DEXLANSOPRAZOLE 60 MG/1
1 CAPSULE, DELAYED RELEASE ORAL DAILY
Qty: 90 CAPSULE | Refills: 1 | Status: SHIPPED | OUTPATIENT
Start: 2025-05-19

## 2025-05-19 RX ORDER — ATORVASTATIN CALCIUM 10 MG/1
10 TABLET, FILM COATED ORAL NIGHTLY
Qty: 90 TABLET | Refills: 1 | Status: SHIPPED | OUTPATIENT
Start: 2025-05-19

## 2025-05-19 ASSESSMENT — ENCOUNTER SYMPTOMS
OCCASIONAL FEELINGS OF UNSTEADINESS: 0
DEPRESSION: 0
LOSS OF SENSATION IN FEET: 0

## 2025-05-19 ASSESSMENT — ACTIVITIES OF DAILY LIVING (ADL)
MANAGING_FINANCES: INDEPENDENT
DOING_HOUSEWORK: INDEPENDENT
GROCERY_SHOPPING: INDEPENDENT
DRESSING: INDEPENDENT
BATHING: INDEPENDENT
TAKING_MEDICATION: INDEPENDENT

## 2025-05-19 ASSESSMENT — PATIENT HEALTH QUESTIONNAIRE - PHQ9
SUM OF ALL RESPONSES TO PHQ9 QUESTIONS 1 AND 2: 0
2. FEELING DOWN, DEPRESSED OR HOPELESS: NOT AT ALL
1. LITTLE INTEREST OR PLEASURE IN DOING THINGS: NOT AT ALL

## 2025-05-19 NOTE — PROGRESS NOTES
Subjective   Reason for Visit: Kiley Ward is an 70 y.o. female here for a Medicare Wellness visit.          Reviewed all medications by prescribing practitioner or clinical pharmacist (such as prescriptions, OTCs, herbal therapies and supplements) and documented in the medical record.    HPI  1. Health maintenance/MCR  Overall patient is doing well.   Denies any chest pain, shortness of breath or wheezing upon exertion.  Denies any fever chills or constitutional symptoms, denies any unintentional weight loss.  Denies any nausea/vomiting or constipation/diarrhea.  Denies any urinary symptoms.  Denies any recent change in hearing or vision.  Denies any lightheadedness, dizziness or balance problem  History of breast cancer bilaterally, parathyroidism, and Crocker's esophagus  Immunization: Tdap 10/2024; influenza vaccine 2024  Shingrix UTD 2 doses; PCV20 2024  COVID-19 vaccine up-to-date  Colon Cancer Screenin, repeat in 5 years - DUE  OB/GYN: Sees [place] physicians; Pap smear  and mammogram 2025   Diet: Well-balanced  Exercise: Regularly  Tobacco: Denies use  EtOH: Denies use     2.  Hypertension/status post TAVR/status post ascending aortic aneurysm repair  Screening CT cardiac calcium score in 2024 visualized a dilatation of the ascending aorta 5.2 cm, which was confirmed with CT angio.  She underwent an elective Aortic valve replacement, and Aortic root and Ascending aorta graft replacement with DR. Ramirez, on 2024  She is overall feeling well. She has been exercising regularly at the gym using the elliptical, treadmill, or stationary bike for 45 minutes three times a week.   She has been doing Pilates once a week.   Denies chest pain, orthopnea, pnd, lightheadedness, dizziness, syncope, palpitations, lower extremity edema, or bleeding issues.   She is taking aspirin and Coreg 12.5 mg twice daily  Has a recommendation to take 2 g of amoxicillin prior to any dental procedure    3.  Hyperlipidemia  He has a history of elevated LDL and was started on Lipitor 10 mg daily in 2024  Lipid panel  in January 2025 was within normal limits  ASCVD score is elevated at 16.3%    4. Vitamin D def  Currently on Vit D and Calcium supplementation.   Currently sees and endocrinologist who takes care of this and her DEXA scan which she states her most recent was last year.   Our records show DEXA 2021 - osteoporosis    5. History of bilateral breast cancer  Follows with oncologist who is following with mammograms, breast ultrasounds and breast MRI.  Previously on Exemestane Rx stopped in 9/2024 after finishing 10-year course    6. Gerd/Hx of barretts eso  Endoscopy in 2023 showed improvement per patient.   Taking Dexlansoprazole for GERD and sxs are well controlled. She is interested in having us take over this Rx as well.     7. Melanoma of the nose, s/p surgery  Patient was diagnosed with melanoma of her nasal skin and had Mohs surgery in February 2025.  Follows regularly with dermatology.  No current symptoms      No Known Allergies    Immunization History   Administered Date(s) Administered    COVID-19, mRNA, LNP-S, PF, 30 mcg/0.3 mL dose 03/23/2021, 04/13/2021, 01/20/2022    Flu vaccine (IIV4), preservative free *Check age/dose* 11/09/2017    Flu vaccine, trivalent, preservative free, HIGH-DOSE, age 65y+ (Fluzone) 10/18/2022, 09/16/2024    Flu vaccine, trivalent, preservative free, age 6 months and greater (Fluarix/Fluzone/Flulaval) 11/08/2018    Influenza, Seasonal, Quadrivalent, Adjuvanted 01/05/2024    Influenza, Unspecified 10/26/2012, 10/16/2013, 10/30/2014, 11/16/2015, 11/09/2017, 11/08/2018, 10/20/2020, 10/18/2022, 01/05/2024    Influenza, seasonal, injectable 10/26/2012, 10/16/2013, 10/30/2014, 11/16/2015    Pfizer COVID-19 vaccine, 12 years and older, (30mcg/0.3mL) (Comirnaty) 09/16/2024    Pneumococcal conjugate vaccine, 20-valent (PREVNAR 20) 02/28/2024    Tdap vaccine, age 7 year and older  "(BOOSTRIX, ADACEL) 09/04/2014, 10/10/2024    Zoster vaccine, recombinant, adult (SHINGRIX) 09/26/2019, 01/16/2020    Zoster, live 03/25/2014       Patient Self Assessment of Health Status  Patient Self Assessment: Excellent    Nutrition and Exercise  Current Diet: Well Balanced Diet  Adequate Fluid Intake: No  Caffeine: Yes  Exercise Frequency: Regularly    Functional Ability/Level of Safety  Cognitive Impairment Observed: No cognitive impairment observed    Home Safety Risk Factors: None    Patient Care Team:  Eloise Naylor DO as PCP - General  Eloise Naylor DO as PCP - [employer] PCP  Brenda Rodrigues, APRN-CNP as Nurse Practitioner (Cardiology)     Review of Systems  All pertinent positive symptoms are included in the history of present illness.    All other systems have been reviewed and are negative and noncontributory to this patient's current ailments.    Objective   Vitals:  /76 (BP Location: Left arm, Patient Position: Sitting, BP Cuff Size: Adult)   Pulse 82   Ht 1.626 m (5' 4\")   Wt 67.5 kg (148 lb 12.8 oz)   SpO2 95%   BMI 25.54 kg/m²     Admission on 02/25/2025, Discharged on 02/25/2025   Component Date Value Ref Range Status    ABO TYPE 02/25/2025 O   Final    Rh TYPE 02/25/2025 POS   Final    ANTIBODY SCREEN 02/25/2025 NEG   Final   Admission on 01/28/2025, Discharged on 01/29/2025   Component Date Value Ref Range Status    Case Report 01/28/2025    Final                    Value:Surgical Pathology                                Case: [id number]                                  Authorizing Provider:  Duke Shipman MD        Collected:           01/28/2025 0843              Ordering Location:     [facility]       Received:            01/28/2025 [facility]                                     [facility] OR                                                             Pathologist:           Carolyn Hendrickson MD                                                       " "   Specimen:    SKIN EXCISION, CENTRAL FOREHEAD LESION                                                     FINAL DIAGNOSIS 01/28/2025    Final                    Value:A.  Skin, central forehead, excision:  --Intradermal nevus, not present at margins    Comment: Dr. Shipman confirmed that this nevus represents an incidental clinical finding distant to the patient's previously diagnosed melanoma via secure chat on 2/5/25.    :  Dr. K. Behrens        01/28/2025    Final                    Value:By the signature on this report, the individual or group listed as making the Final Interpretation/Diagnosis certifies that they have reviewed this case.       Clinical History 01/28/2025    Final                    Value:Pre-op diagnosis:  Melanoma of nose (Multi) [C43.31]    Gross Description 01/28/2025    Final                    Value:A:  Received in formalin, labeled with the patient's name and hospital number and \"central forehead lesion\", is an unoriented, elliptical segment of skin with subcutaneous tissue measuring 1.5 x 1.0 x 0.5 cm.  On the skin surface is an ovoid, elevated area measuring 0.4 x 0.4 cm which extends within 0.3 cm to both lateral margins. The deep and lateral margins are inked blue. The specimen is sectioned and entirely submitted in 2 cassettes.   MRS     Summary of Cassettes:  Specimen Label Site  A  1 tips  2 body of ellipse      Disclaimer 01/28/2025    Final                    Value:One or more of the reagents used to perform assays on this specimen MAY have contained components considered to be analyte specific reagents (ASR's).  ASR's have not been cleared or approved by the U.S. Food and Drug Administration.  These assays were developed and their performance characteristics determined by the Department of Pathology at [facility]. The FDA does not require this test to go through premarket FDA review. This test is used for clinical purposes. It " should not be regarded as investigational or for research. This laboratory is certified under the Clinical Laboratory Improvement Amendments (CLIA) as qualified to perform high complexity clinical laboratory testing.  The assays were performed with appropriate positive and negative controls which stained appropriately.     Pre-Admission Testing on 01/21/2025   Component Date Value Ref Range Status    WBC 01/21/2025 4.9  4.4 - 11.3 x10*3/uL Final    nRBC 01/21/2025 0.0  0.0 - 0.0 /100 WBCs Final    RBC 01/21/2025 4.05  4.00 - 5.20 x10*6/uL Final    Hemoglobin 01/21/2025 11.6 (L)  12.0 - 16.0 g/dL Final    Hematocrit 01/21/2025 36.6  36.0 - 46.0 % Final    MCV 01/21/2025 90  80 - 100 fL Final    MCH 01/21/2025 28.6  26.0 - 34.0 pg Final    MCHC 01/21/2025 31.7 (L)  32.0 - 36.0 g/dL Final    RDW 01/21/2025 14.3  11.5 - 14.5 % Final    Platelets 01/21/2025 344  150 - 450 x10*3/uL Final    ABO TYPE 01/21/2025 O   Final    Rh TYPE 01/21/2025 POS   Final    ANTIBODY SCREEN 01/21/2025 NEG   Final   Hospital Outpatient Visit on 01/08/2025   Component Date Value Ref Range Status    POCT Creatinine 01/08/2025 0.75  0.60 - 1.30 mg/dL Final    POCT eGFR 01/08/2025 87  >=60 mL/min/1.73m*2 Final    Calculations of estimated GFR are performed using the 2021 CKD-EPI Study Refit  equation without the race variable for the IDMS-Traceable Creatinine Methods.     https://jasn.asnjournals.org/content/early/2021/09/22/ASN.3499528760   Lab on 01/08/2025   Component Date Value Ref Range Status    Glucose 01/08/2025 99  74 - 99 mg/dL Final    Sodium 01/08/2025 139  136 - 145 mmol/L Final    Potassium 01/08/2025 4.3  3.5 - 5.3 mmol/L Final    Chloride 01/08/2025 105  98 - 107 mmol/L Final    Bicarbonate 01/08/2025 29  21 - 32 mmol/L Final    Anion Gap 01/08/2025 9 (L)  10 - 20 mmol/L Final    Urea Nitrogen 01/08/2025 15  6 - 23 mg/dL Final    Creatinine 01/08/2025 0.80  0.50 - 1.05 mg/dL Final    eGFR 01/08/2025 81  >60 mL/min/1.73m*2 Final     Calculations of estimated GFR are performed using the 2021 CKD-EPI Study Refit equation without the race variable for the IDMS-Traceable creatinine methods.  https://jasn.asnjournals.org/content/early/2021/09/22/ASN.6146170318    Calcium 01/08/2025 9.1  8.6 - 10.3 mg/dL Final    Albumin 01/08/2025 4.0  3.4 - 5.0 g/dL Final    Alkaline Phosphatase 01/08/2025 103  33 - 136 U/L Final    Total Protein 01/08/2025 7.3  6.4 - 8.2 g/dL Final    AST 01/08/2025 24  9 - 39 U/L Final    Bilirubin, Total 01/08/2025 0.5  0.0 - 1.2 mg/dL Final    ALT 01/08/2025 24  7 - 45 U/L Final    Patients treated with Sulfasalazine may generate falsely decreased results for ALT.    Cholesterol 01/08/2025 154  0 - 199 mg/dL Final          Age      Desirable   Borderline High   High     0-19 Y     0 - 169       170 - 199     >/= 200    20-24 Y     0 - 189       190 - 224     >/= 225         >24 Y     0 - 199       200 - 239     >/= 240   **All ranges are based on fasting samples. Specific   therapeutic targets will vary based on patient-specific   cardiac risk.    Pediatric guidelines reference:Pediatrics 2011, 128(S5).Adult guidelines reference: NCEP ATPIII Guidelines,EMMETT 2001, 258:2486-97    Venipuncture immediately after or during the administration of Metamizole may lead to falsely low results. Testing should be performed immediately prior to Metamizole dosing.    HDL-Cholesterol 01/08/2025 59.5  mg/dL Final      Age       Very Low   Low     Normal    High    0-19 Y    < 35      < 40     40-45     ----  20-24 Y    ----     < 40      >45      ----        >24 Y      ----     < 40     40-60      >60      Cholesterol/HDL Ratio 01/08/2025 2.6   Final      Ref Values  Desirable  < 3.4  High Risk  > 5.0    LDL Calculated 01/08/2025 81  <=99 mg/dL Final                                Near   Borderline      AGE      Desirable  Optimal    High     High     Very High     0-19 Y     0 - 109     ---    110-129   >/= 130     ----    20-24 Y     0 -  119     ---    120-159   >/= 160     ----      >24 Y     0 -  99   100-129  130-159   160-189     >/=190      VLDL 01/08/2025 14  0 - 40 mg/dL Final    Triglycerides 01/08/2025 70  0 - 149 mg/dL Final    Age              Desirable        Borderline         High        Very High  SEX:B           mg/dL             mg/dL               mg/dL      mg/dL  <=14D                       ----               ----        ----  15D-365D                    ----               ----        ----  1Y-9Y           0-74               75-99             >=100       ----  10Y-19Y        0-89                            >=130       ----  20Y-24Y        0-114             115-149             >=150      ----  >= 25Y         0-149             150-199             200-499    >=500      Venipuncture immediately after or during the administration of Metamizole may lead to falsely low results. Testing should be performed immediately prior to Metamizole dosing.    Non HDL Cholesterol 01/08/2025 95  0 - 149 mg/dL Final          Age       Desirable   Borderline High   High     Very High     0-19 Y     0 - 119       120 - 144     >/= 145    >/= 160    20-24 Y     0 - 149       150 - 189     >/= 190      ----         >24 Y    30 mg/dL above LDL Cholesterol goal      WBC 01/08/2025 4.1 (L)  4.4 - 11.3 x10*3/uL Final    nRBC 01/08/2025 0.0  0.0 - 0.0 /100 WBCs Final    RBC 01/08/2025 3.87 (L)  4.00 - 5.20 x10*6/uL Final    Hemoglobin 01/08/2025 11.1 (L)  12.0 - 16.0 g/dL Final    Hematocrit 01/08/2025 34.7 (L)  36.0 - 46.0 % Final    MCV 01/08/2025 90  80 - 100 fL Final    MCH 01/08/2025 28.7  26.0 - 34.0 pg Final    MCHC 01/08/2025 32.0  32.0 - 36.0 g/dL Final    RDW 01/08/2025 14.3  11.5 - 14.5 % Final    Platelets 01/08/2025 327  150 - 450 x10*3/uL Final    Neutrophils % 01/08/2025 60.8  40.0 - 80.0 % Final    Immature Granulocytes %, Automated 01/08/2025 0.2  0.0 - 0.9 % Final    Immature Granulocyte Count (IG) includes promyelocytes,  myelocytes and metamyelocytes but does not include bands. Percent differential counts (%) should be interpreted in the context of the absolute cell counts (cells/UL).    Lymphocytes % 01/08/2025 23.5  13.0 - 44.0 % Final    Monocytes % 01/08/2025 11.8  2.0 - 10.0 % Final    Eosinophils % 01/08/2025 2.7  0.0 - 6.0 % Final    Basophils % 01/08/2025 1.0  0.0 - 2.0 % Final    Neutrophils Absolute 01/08/2025 2.48  1.20 - 7.70 x10*3/uL Final    Percent differential counts (%) should be interpreted in the context of the absolute cell counts (cells/uL).    Immature Granulocytes Absolute, Au* 01/08/2025 0.01  0.00 - 0.70 x10*3/uL Final    Lymphocytes Absolute 01/08/2025 0.96 (L)  1.20 - 4.80 x10*3/uL Final    Monocytes Absolute 01/08/2025 0.48  0.10 - 1.00 x10*3/uL Final    Eosinophils Absolute 01/08/2025 0.11  0.00 - 0.70 x10*3/uL Final    Basophils Absolute 01/08/2025 0.04  0.00 - 0.10 x10*3/uL Final   Hospital Outpatient Visit on 01/07/2025   Component Date Value Ref Range Status    AV pk julio 01/07/2025 2.32  m/s Final    AV mn grad 01/07/2025 12  mmHg Final    LVOT diam 01/07/2025 1.89  cm Final    MV E/A ratio 01/07/2025 0.66   Final    LA vol index A/L 01/07/2025 41.9  ml/m2 Final    Tricuspid annular plane systolic e* 01/07/2025 1.6  cm Final    LV EF 01/07/2025 53  % Final    RV free wall pk S' 01/07/2025 8.18  cm/s Final    LVIDd 01/07/2025 4.29  cm Final    RVSP 01/07/2025 26.6  mmHg Final    Aortic Valve Area by Continuity of* 01/07/2025 1.12  cm2 Final    Aortic Valve Area by Continuity of* 01/07/2025 1.37  cm2 Final    AV pk grad 01/07/2025 22  mmHg Final    LV A4C EF 01/07/2025 51.8   Final   Office Visit on 12/16/2024   Component Date Value Ref Range Status    Ventricular Rate 12/16/2024 75  BPM Final    Atrial Rate 12/16/2024 75  BPM Final    NE Interval 12/16/2024 160  ms Final    QRS Duration 12/16/2024 78  ms Final    QT Interval 12/16/2024 416  ms Final    QTC Calculation(Bazett) 12/16/2024 464  ms Final     P Axis 12/16/2024 39  degrees Final    R Axis 12/16/2024 -35  degrees Final    T Axis 12/16/2024 91  degrees Final    QRS Count 12/16/2024 13  beats Final    Q Onset 12/16/2024 214  ms Final    P Onset 12/16/2024 134  ms Final    P Offset 12/16/2024 194  ms Final    T Offset 12/16/2024 422  ms Final    QTC Fredericia 12/16/2024 447  ms Final   Lab on 12/06/2024   Component Date Value Ref Range Status    WBC 12/06/2024 4.6  4.4 - 11.3 x10*3/uL Final    nRBC 12/06/2024 0.0  0.0 - 0.0 /100 WBCs Final    RBC 12/06/2024 3.25 (L)  4.00 - 5.20 x10*6/uL Final    Hemoglobin 12/06/2024 9.7 (L)  12.0 - 16.0 g/dL Final    Hematocrit 12/06/2024 30.5 (L)  36.0 - 46.0 % Final    MCV 12/06/2024 94  80 - 100 fL Final    MCH 12/06/2024 29.8  26.0 - 34.0 pg Final    MCHC 12/06/2024 31.8 (L)  32.0 - 36.0 g/dL Final    RDW 12/06/2024 13.7  11.5 - 14.5 % Final    Platelets 12/06/2024 388  150 - 450 x10*3/uL Final   Lab on 11/25/2024   Component Date Value Ref Range Status    Magnesium 11/25/2024 2.07  1.60 - 2.40 mg/dL Final    WBC 11/25/2024 6.3  4.4 - 11.3 x10*3/uL Final    nRBC 11/25/2024 0.0  0.0 - 0.0 /100 WBCs Final    RBC 11/25/2024 3.13 (L)  4.00 - 5.20 x10*6/uL Final    Hemoglobin 11/25/2024 9.2 (L)  12.0 - 16.0 g/dL Final    Hematocrit 11/25/2024 29.8 (L)  36.0 - 46.0 % Final    MCV 11/25/2024 95  80 - 100 fL Final    MCH 11/25/2024 29.4  26.0 - 34.0 pg Final    MCHC 11/25/2024 30.9 (L)  32.0 - 36.0 g/dL Final    RDW 11/25/2024 13.5  11.5 - 14.5 % Final    Platelets 11/25/2024 608 (H)  150 - 450 x10*3/uL Final    Neutrophils % 11/25/2024 66.7  40.0 - 80.0 % Final    Immature Granulocytes %, Automated 11/25/2024 0.6  0.0 - 0.9 % Final    Immature Granulocyte Count (IG) includes promyelocytes, myelocytes and metamyelocytes but does not include bands. Percent differential counts (%) should be interpreted in the context of the absolute cell counts (cells/UL).    Lymphocytes % 11/25/2024 16.2  13.0 - 44.0 % Final    Monocytes %  11/25/2024 10.1  2.0 - 10.0 % Final    Eosinophils % 11/25/2024 5.0  0.0 - 6.0 % Final    Basophils % 11/25/2024 1.4  0.0 - 2.0 % Final    Neutrophils Absolute 11/25/2024 4.17  1.20 - 7.70 x10*3/uL Final    Percent differential counts (%) should be interpreted in the context of the absolute cell counts (cells/uL).    Immature Granulocytes Absolute, Au* 11/25/2024 0.04  0.00 - 0.70 x10*3/uL Final    Lymphocytes Absolute 11/25/2024 1.01 (L)  1.20 - 4.80 x10*3/uL Final    Monocytes Absolute 11/25/2024 0.63  0.10 - 1.00 x10*3/uL Final    Eosinophils Absolute 11/25/2024 0.31  0.00 - 0.70 x10*3/uL Final    Basophils Absolute 11/25/2024 0.09  0.00 - 0.10 x10*3/uL Final    Glucose 11/25/2024 96  74 - 99 mg/dL Final    Sodium 11/25/2024 136  136 - 145 mmol/L Final    Potassium 11/25/2024 4.3  3.5 - 5.3 mmol/L Final    Chloride 11/25/2024 101  98 - 107 mmol/L Final    Bicarbonate 11/25/2024 26  21 - 32 mmol/L Final    Anion Gap 11/25/2024 13  10 - 20 mmol/L Final    Urea Nitrogen 11/25/2024 16  6 - 23 mg/dL Final    Creatinine 11/25/2024 0.91  0.50 - 1.05 mg/dL Final    eGFR 11/25/2024 69  >60 mL/min/1.73m*2 Final    Calculations of estimated GFR are performed using the 2021 CKD-EPI Study Refit equation without the race variable for the IDMS-Traceable creatinine methods.  https://jasn.asnjournals.org/content/early/2021/09/22/ASN.8282258649    Calcium 11/25/2024 9.1  8.6 - 10.3 mg/dL Final    Albumin 11/25/2024 3.6  3.4 - 5.0 g/dL Final    Alkaline Phosphatase 11/25/2024 491 (H)  33 - 136 U/L Final    Total Protein 11/25/2024 7.7  6.4 - 8.2 g/dL Final    AST 11/25/2024 22  9 - 39 U/L Final    Bilirubin, Total 11/25/2024 0.4  0.0 - 1.2 mg/dL Final    ALT 11/25/2024 42  7 - 45 U/L Final    Patients treated with Sulfasalazine may generate falsely decreased results for ALT.    Phosphorus 11/25/2024 4.1  2.5 - 4.9 mg/dL Final    The performance characteristics of phosphorus testing in heparinized plasma have been validated by the  individual  laboratory site where testing is performed. Testing on heparinized plasma is not approved by the FDA; however, such approval is not necessary.       Physical Exam  CONSTITUTIONAL - well nourished, well developed, looks like stated age, in no acute distress, not ill-appearing, and not tired appearing  SKIN - normal skin color and pigmentation, normal skin turgor without rash, lesions, or nodules visualized  HEAD - no trauma, normocephalic  EYES - pupils are equal and reactive to light, extraocular muscles are intact, and normal external exam  ENT -  no signs of infection, uvula midline, normal tongue movement and throat normal, no exudate, nasal passage without discharge and patent  NECK - supple without rigidity, no neck mass was observed  CHEST - clear to auscultation, no wheezing, no crackles and no rales, good effort  CARDIAC - regular rate and regular rhythm, no skipped beats, 3/6 systolic murmur  ABDOMEN - no organomegaly, soft, nontender, nondistended, normal bowel sounds, no guarding/rebound/rigidity  EXTREMITIES - no edema, no deformities  NEUROLOGICAL - normal gait, normal balance, normal motor, no ataxia; alert, oriented and no focal signs  PSYCHIATRIC - alert, pleasant and cordial, age-appropriate  IMMUNOLOGIC - no cervical lymphadenopathy    Assessment/Plan     1. Health maintenance/[facility]  Complete history and physical examination was performed  Requisition for routine lab work was ordered today  We will review the test results once available and make treatment commendation accordingly.  Patient is up-to-date with her immunization  She is following with oncology for her mammograms and surveillance  She is following with GI and is planning to do EGD and colonoscopy in the near future.  Educated about the importance of conducting healthy lifestyle, following well-balanced diet and exercising regularly    Reviewed in detail prior lab work, imaging and other specialties notes     2.  Hypertension/status post TAVR/status post ascending aortic aneurysm repair  Stable, asymptomatic.  Continue following with cardiology, per their recommendation  Continue Coreg 12.5 mg twice daily and ASA  The 10-year ASCVD risk score (Deangelo VICTORIA, et al., 2019) is: 11.6%    Values used to calculate the score:      Age: 70 years      Sex: Female      Is Non- : No      Diabetic: Yes      Tobacco smoker: No      Systolic Blood Pressure: 122 mmHg      Is BP treated: No      HDL Cholesterol: 59.5 mg/dL      Total Cholesterol: 154 mg/dL  Cardiovascular risk discussed and, if needed, lifestyle modifications recommended, including nutritional choices, exercise, and elimination of habits contributing to risk.    We agreed on a plan to reduce the current cardiovascular risk    3. Hyperlipidemia  Previous Lipid panel showed Cholesterol 154, HDL 59, LDL 81, triglyceride 70   Currently on Lipitor 10 mg daily  Refills were sent to her local pharmacy  CT cardiac score from May 2024 was 0    3. Vitamin D def  Currently on Vit D and Calcium supplementation.   Continue seeing your endocrinologist at [facility].    4. History of bilateral breast cancer  We will wait to prescribe your exemestane until you follow up with your oncologist as we do not have experience with this medication.      5. Gerd/Hx of barretts eso  A refill of your Dexlansoprazole was sent to your pharmacy.    Patient will follow with GI office for her EGD and colonoscopy    6. Melanoma of the nose, s/p surgery  Continue following with dermatology per their recommendations    Thank you for letting us be a part of your care team.  Please call the office if you have further questions or concerns regarding your care    Otherwise, please follow-up in 6 months for continued care and refills     Barrera Yan MD  PGY2, FM Resident

## 2025-05-20 ENCOUNTER — HOSPITAL ENCOUNTER (OUTPATIENT)
Dept: RADIOLOGY | Facility: HOSPITAL | Age: 70
Discharge: HOME | End: 2025-05-20
Payer: MEDICARE

## 2025-05-20 DIAGNOSIS — R92.333 HETEROGENEOUSLY DENSE TISSUE OF BOTH BREASTS ON MAMMOGRAPHY: ICD-10-CM

## 2025-05-20 DIAGNOSIS — Z85.3 HISTORY OF MALIGNANT NEOPLASM OF BREAST: ICD-10-CM

## 2025-05-20 PROCEDURE — A9575 INJ GADOTERATE MEGLUMI 0.1ML: HCPCS

## 2025-05-20 PROCEDURE — 2550000001 HC RX 255 CONTRASTS

## 2025-05-20 PROCEDURE — 77049 MRI BREAST C-+ W/CAD BI: CPT

## 2025-05-20 PROCEDURE — 77049 MRI BREAST C-+ W/CAD BI: CPT | Performed by: RADIOLOGY

## 2025-05-20 RX ORDER — GADOTERATE MEGLUMINE 376.9 MG/ML
13 INJECTION INTRAVENOUS
Status: COMPLETED | OUTPATIENT
Start: 2025-05-20 | End: 2025-05-20

## 2025-05-20 RX ADMIN — GADOTERATE MEGLUMINE 13 ML: 376.9 INJECTION INTRAVENOUS at 10:06

## 2025-05-24 LAB
ALBUMIN SERPL-MCNC: 4.2 G/DL (ref 3.6–5.1)
ALP SERPL-CCNC: 94 U/L (ref 37–153)
ALT SERPL-CCNC: 19 U/L (ref 6–29)
ANION GAP SERPL CALCULATED.4IONS-SCNC: 8 MMOL/L (CALC) (ref 7–17)
AST SERPL-CCNC: 19 U/L (ref 10–35)
BASOPHILS # BLD AUTO: 58 CELLS/UL (ref 0–200)
BASOPHILS NFR BLD AUTO: 1.1 %
BILIRUB SERPL-MCNC: 0.5 MG/DL (ref 0.2–1.2)
BUN SERPL-MCNC: 22 MG/DL (ref 7–25)
CALCIUM SERPL-MCNC: 9.1 MG/DL (ref 8.6–10.4)
CHLORIDE SERPL-SCNC: 104 MMOL/L (ref 98–110)
CHOLEST SERPL-MCNC: 152 MG/DL
CHOLEST/HDLC SERPL: 2.7 (CALC)
CO2 SERPL-SCNC: 28 MMOL/L (ref 20–32)
CREAT SERPL-MCNC: 0.87 MG/DL (ref 0.5–1.05)
EGFRCR SERPLBLD CKD-EPI 2021: 73 ML/MIN/1.73M2
EOSINOPHIL # BLD AUTO: 180 CELLS/UL (ref 15–500)
EOSINOPHIL NFR BLD AUTO: 3.4 %
ERYTHROCYTE [DISTWIDTH] IN BLOOD BY AUTOMATED COUNT: 13.8 % (ref 11–15)
EST. AVERAGE GLUCOSE BLD GHB EST-MCNC: 117 MG/DL
EST. AVERAGE GLUCOSE BLD GHB EST-SCNC: 6.5 MMOL/L
GLUCOSE SERPL-MCNC: 93 MG/DL (ref 65–99)
HBA1C MFR BLD: 5.7 %
HCT VFR BLD AUTO: 37.3 % (ref 35–45)
HDLC SERPL-MCNC: 57 MG/DL
HGB BLD-MCNC: 11.7 G/DL (ref 11.7–15.5)
LDLC SERPL CALC-MCNC: 81 MG/DL (CALC)
LYMPHOCYTES # BLD AUTO: 1129 CELLS/UL (ref 850–3900)
LYMPHOCYTES NFR BLD AUTO: 21.3 %
MCH RBC QN AUTO: 30 PG (ref 27–33)
MCHC RBC AUTO-ENTMCNC: 31.4 G/DL (ref 32–36)
MCV RBC AUTO: 95.6 FL (ref 80–100)
MONOCYTES # BLD AUTO: 440 CELLS/UL (ref 200–950)
MONOCYTES NFR BLD AUTO: 8.3 %
NEUTROPHILS # BLD AUTO: 3493 CELLS/UL (ref 1500–7800)
NEUTROPHILS NFR BLD AUTO: 65.9 %
NONHDLC SERPL-MCNC: 95 MG/DL (CALC)
PLATELET # BLD AUTO: 302 THOUSAND/UL (ref 140–400)
PMV BLD REES-ECKER: 10.7 FL (ref 7.5–12.5)
POTASSIUM SERPL-SCNC: 4.1 MMOL/L (ref 3.5–5.3)
PROT SERPL-MCNC: 7.1 G/DL (ref 6.1–8.1)
RBC # BLD AUTO: 3.9 MILLION/UL (ref 3.8–5.1)
SODIUM SERPL-SCNC: 140 MMOL/L (ref 135–146)
TRIGL SERPL-MCNC: 68 MG/DL
TSH SERPL-ACNC: 1.05 MIU/L (ref 0.4–4.5)
WBC # BLD AUTO: 5.3 THOUSAND/UL (ref 3.8–10.8)

## 2025-05-25 NOTE — RESULT ENCOUNTER NOTE
Complete blood cell count shows no anemia    Hemoglobin A1c slightly elevated 5.7%    Thyroid within normal limits    Cholesterol is good at 152, HDL 57, LDL 68, LDL 81    Sugar, kidneys, liver, electrolytes are all within normal limits

## 2025-06-26 ENCOUNTER — APPOINTMENT (OUTPATIENT)
Dept: OPHTHALMOLOGY | Facility: CLINIC | Age: 70
End: 2025-06-26
Payer: MEDICARE

## 2025-08-19 ENCOUNTER — APPOINTMENT (OUTPATIENT)
Dept: SURGICAL ONCOLOGY | Facility: CLINIC | Age: 70
End: 2025-08-19
Payer: MEDICARE

## 2025-08-19 ENCOUNTER — APPOINTMENT (OUTPATIENT)
Dept: RADIOLOGY | Facility: CLINIC | Age: 70
End: 2025-08-19
Payer: MEDICARE

## 2025-08-27 ENCOUNTER — HOSPITAL ENCOUNTER (OUTPATIENT)
Dept: RADIOLOGY | Facility: CLINIC | Age: 70
Discharge: HOME | End: 2025-08-27
Payer: MEDICARE

## 2025-08-27 ENCOUNTER — OFFICE VISIT (OUTPATIENT)
Dept: SURGICAL ONCOLOGY | Facility: CLINIC | Age: 70
End: 2025-08-27
Payer: MEDICARE

## 2025-08-27 VITALS
SYSTOLIC BLOOD PRESSURE: 151 MMHG | OXYGEN SATURATION: 99 % | HEART RATE: 60 BPM | DIASTOLIC BLOOD PRESSURE: 94 MMHG | TEMPERATURE: 98.3 F | BODY MASS INDEX: 24.79 KG/M2 | WEIGHT: 144.4 LBS

## 2025-08-27 DIAGNOSIS — R92.333 HETEROGENEOUSLY DENSE TISSUE OF BOTH BREASTS ON MAMMOGRAPHY: Primary | ICD-10-CM

## 2025-08-27 DIAGNOSIS — Z85.3 HISTORY OF MALIGNANT NEOPLASM OF BREAST: ICD-10-CM

## 2025-08-27 DIAGNOSIS — Z12.31 ENCOUNTER FOR SCREENING MAMMOGRAM FOR MALIGNANT NEOPLASM OF BREAST: ICD-10-CM

## 2025-08-27 DIAGNOSIS — C43.31 MELANOMA OF NOSE (MULTI): ICD-10-CM

## 2025-08-27 DIAGNOSIS — N63.23 MASS OF LOWER OUTER QUADRANT OF LEFT BREAST: ICD-10-CM

## 2025-08-27 PROCEDURE — 99214 OFFICE O/P EST MOD 30 MIN: CPT

## 2025-08-27 PROCEDURE — 77063 BREAST TOMOSYNTHESIS BI: CPT | Performed by: RADIOLOGY

## 2025-08-27 PROCEDURE — 1159F MED LIST DOCD IN RCRD: CPT

## 2025-08-27 PROCEDURE — 77067 SCR MAMMO BI INCL CAD: CPT | Performed by: RADIOLOGY

## 2025-08-27 PROCEDURE — 3077F SYST BP >= 140 MM HG: CPT

## 2025-08-27 PROCEDURE — 77067 SCR MAMMO BI INCL CAD: CPT

## 2025-08-27 PROCEDURE — 3080F DIAST BP >= 90 MM HG: CPT

## 2025-08-27 PROCEDURE — 1036F TOBACCO NON-USER: CPT

## 2025-08-27 PROCEDURE — 1126F AMNT PAIN NOTED NONE PRSNT: CPT

## 2025-08-27 ASSESSMENT — PAIN SCALES - GENERAL: PAINLEVEL_OUTOF10: 0-NO PAIN

## 2025-10-27 ENCOUNTER — APPOINTMENT (OUTPATIENT)
Dept: OPHTHALMOLOGY | Age: 70
End: 2025-10-27
Payer: MEDICARE

## 2025-11-05 ENCOUNTER — APPOINTMENT (OUTPATIENT)
Dept: GENETICS | Facility: CLINIC | Age: 70
End: 2025-11-05
Payer: MEDICARE

## 2025-12-04 ENCOUNTER — APPOINTMENT (OUTPATIENT)
Dept: OPHTHALMOLOGY | Facility: CLINIC | Age: 70
End: 2025-12-04
Payer: MEDICARE

## (undated) DEVICE — TAPE, POLYESTER, BRAIDED, PRE-CUT 2 X 30, WHITE"

## (undated) DEVICE — SPONGE, DISSECTOR, PEANUT, 3/8, STERILE 5 FOAM HOLDER"

## (undated) DEVICE — CANNULA, CARDIAC SUMP

## (undated) DEVICE — SENSOR, OXYGEN, CEREBRAL, SOMASENSOR, ADULT

## (undated) DEVICE — ADAPTER, Y, CORONARY PERFUSION

## (undated) DEVICE — DRAPE, FLUID WARMER

## (undated) DEVICE — Device

## (undated) DEVICE — GUIDEWIRE, HI-TORQUE, VERSACORE, 145CM, FLOPPY

## (undated) DEVICE — MICROCOAGULATION TEST, ACT+ TEST CUVETTE

## (undated) DEVICE — MANIFOLD, 4 PORT NEPTUNE STANDARD

## (undated) DEVICE — CLEANER, ELECTROSURGICAL, TIP, 5 X 5 CM, LF

## (undated) DEVICE — TRAY, SURESTEP, URINE METER, 14FR, SILICONE

## (undated) DEVICE — PLEDGET, PTFE, SOFT, LARGE, 3/8 X 3/16 X 1/16 IN

## (undated) DEVICE — CONNECTOR, Y, 0.375 X 0.375 X 0.5 IN

## (undated) DEVICE — COVER, TABLE, 44 X 75 IN, DISPOSABLE, LF, STERILE

## (undated) DEVICE — CONNECTOR, STRAIGHT, 0.375 X 0.375 IN

## (undated) DEVICE — APPLICATOR, CHLORAPREP, W/ORANGE TINT, 26ML

## (undated) DEVICE — SPONGE, HEMOSTATIC, GELATIN, SURGIFOAM, 8 X 12.5 CM X 10 MM

## (undated) DEVICE — SUTURE, PROLENE, 4-0, 36 IN, BB, BLUE

## (undated) DEVICE — SPONGE GAUZE, XRAY SC+RFID, 4X4 16 PLY, STERILE

## (undated) DEVICE — SHUNT, SENSOR

## (undated) DEVICE — TR BAND, RADIAL COMPRESSION, STANDARD, 24CM

## (undated) DEVICE — LOOP, VESSEL, MAXI, BLUE

## (undated) DEVICE — TIP, SUCTION, YANKAUER, W/O VENT, FLEXIBLE, OPEN TIP, HIGH CAPACITY

## (undated) DEVICE — CATHETER, THORACIC, STRAIGHT, ADULT, 28 FR, PVC

## (undated) DEVICE — CATHETER TRAY, SURESTEP, 16FR, URINE METER W/STATLOCK

## (undated) DEVICE — DRAIN, PENROSE, 0.25 X 12 IN

## (undated) DEVICE — DRAPE, CARDIOVASCULAR, SPLIT, 115 X 147 IN

## (undated) DEVICE — DRESSING, ADHESIVE, ISLAND, TELFA, 2 X 3.75 IN, LF

## (undated) DEVICE — INTRODUCER, GLIDESHEATH SLENDER A-KIT, 5FR 10CM

## (undated) DEVICE — CATHETER, THORACIC, STRAIGHT, SOFT, TAPER TIP, 32 FR

## (undated) DEVICE — FILTER, IV, BLOOD, MICROAGGREGATE, 40 MIC, RBC TRANSFUSION

## (undated) DEVICE — COVER, CART, 45 X 27 X 48 IN, CLEAR

## (undated) DEVICE — BURR, OVAL MEDIUM, 4.0MM

## (undated) DEVICE — SUTURE, ETHIBOND, XTRA, 30 IN, 0, CTX, GREEN

## (undated) DEVICE — PAD, GROUNDING, ELECTROSURGICAL, W/9 FT CABLE, POLYHESIVE II, ADULT, LF

## (undated) DEVICE — STAY SET, SURGICAL, 5MM SHARP HOOK, 8PK

## (undated) DEVICE — REST, HEAD, BAGEL, 7 IN

## (undated) DEVICE — CONTAINER, SPECIMEN, 120 ML, STERILE

## (undated) DEVICE — SUTURE, PROLENE, 3-0, 36 IN, SH, DA, BLUE

## (undated) DEVICE — PERFUSION PACK, HEMOCONCENTRATOR, MINNTECH, W/TUBING

## (undated) DEVICE — CANNULA, OSTIAL, 12FR

## (undated) DEVICE — SUTURE, PROLENE, 2-0, 30 IN, SH, BLUE

## (undated) DEVICE — SUTURE, PROLENE, 5-0, 36 IN, C-1, CV-11, BLUE

## (undated) DEVICE — OSTIAL, BASKET-TIP, 12 FR

## (undated) DEVICE — CATHETER, IV, ANGIOCATH, 14 G X 1.88 IN, FEP POLYMER

## (undated) DEVICE — SUTURE, PROLENE, 4-0, TAPER POINT, SH/SH BLUE 36IN

## (undated) DEVICE — SYRINGE, 20 CC, CONTROL, LUER LOCK, LF

## (undated) DEVICE — APPLIER, LIGACLIP, MULTIPLE, SMALL 9-3/8IN

## (undated) DEVICE — TUBING, 0.375 X 3/32 IN X 6 FT

## (undated) DEVICE — SUTURE, PROLENE, 6-0, 30 IN, C-1, CV-11, BLUE

## (undated) DEVICE — BLADE, OSCILLATING/SAGITTAL, 25MM X 9MM

## (undated) DEVICE — GUIDEWIRE, INQWIRE, 3MM J, .035, 260

## (undated) DEVICE — GOWN, ASTOUND, L

## (undated) DEVICE — DRESSING, MEPILEX, BORDER, SACRUM, 8.7 X 9.8 IN

## (undated) DEVICE — PACING CABLE, EXTENSION, 12 FT BEIGE, DISPOSABLE

## (undated) DEVICE — SUTURE, PROLENE, 5-0, 36 IN, RB1, DA, BLUE

## (undated) DEVICE — MONITORING KIT, TRANSDUCER, RETROGRADE, MPS, W/EXTENSION, LF

## (undated) DEVICE — OXYGENATOR FX 25, W/HR, ARTERIAL FILTER

## (undated) DEVICE — COSEAL SURGICAL SEALANT 8ML

## (undated) DEVICE — CATHETER, DIAGNOSTIC, 4 FR-JL 4.5

## (undated) DEVICE — CASSETTE, BLOOD, PLEGIC SET

## (undated) DEVICE — KIT, CELL SAVER, W/COLLECTION SET, 225ML WASH SET

## (undated) DEVICE — DRESSING, ISLAND, TELFA, 4 X 5 IN

## (undated) DEVICE — TUBING, SUCTION, CONNECTING, NON-CONDUCTIVE, SURE GRIP CONNECTORS, 3/16 X 18 IN, PVC

## (undated) DEVICE — SUTURE, SURGICAL STEEL, STERNUM 7, 18 IN, KV40, SINGL-WIRE

## (undated) DEVICE — INSERT, CLAMP, SURGICAL, SOFT/TRACTION, STEALTH, 5 MM

## (undated) DEVICE — PUNCH, AORTIC 4MM

## (undated) DEVICE — APPLIER,  LIGACLIP MULTI CLIP, 30 MED 11 1/2

## (undated) DEVICE — SUTURE, VICRYL, 4-0, 27 IN, KS, UNDYED

## (undated) DEVICE — CANNULA, VENOUS, 2-STAGE, 32/40 ROUND

## (undated) DEVICE — INSERT, CLAMP, SURGICAL, SOFT/TRACTION, STEALTH, 1 MM

## (undated) DEVICE — NEEDLE, HYPODERMIC, MONOJECT, 27 G X 1.5 IN

## (undated) DEVICE — CATHETER, DIAGNOSTIC, 4 FR-JR 4

## (undated) DEVICE — NEEDLE, HYPODERMIC, 25 G X 1.5 IN, A BEVEL, STERILE

## (undated) DEVICE — CONNECTOR, W/O PARTING LINE, 0.25 X 0.375 IN

## (undated) DEVICE — SUTURE, SILK, 2-0, 30 IN, SH, CONTROL RELEASE, MULTIPACK, BLACK

## (undated) DEVICE — DRAIN, PENROSE, 1/4 IN X 18 IN, STERILE, LF

## (undated) DEVICE — BLADE, SAW STERNUM, STERILE

## (undated) DEVICE — COUNTER, NEEDLE, FOAM BLOCK, POP-N-COUNT, W/BLADEGUARD, W/ADHESIVE 40 COUNT, RED

## (undated) DEVICE — ATS SUCTION LINE

## (undated) DEVICE — KIT, TOURNIQUET, 7"

## (undated) DEVICE — DRESSING, ADHESIVE, ISLAND, TELFA, 4 X 14 IN

## (undated) DEVICE — CATHETER, PIGTAIL 155 MOD DIAGNOSTIC, 4 FR (110 CM)

## (undated) DEVICE — SUTURE, VICRYL, 2-0, 27 IN, CT-1, VIOLET

## (undated) DEVICE — SUTURE, ETHIBOND, XTRA, 30 IN, 0, CT-1, GREEN

## (undated) DEVICE — SYRINGE, LUER LOCK, 12ML

## (undated) DEVICE — FOOT SWITCH, PENCIL, W/HOLSTER, LONGER CORD

## (undated) DEVICE — SUTURE, MONOCRYL, 3-0, 18 IN, PS2, UNDYED

## (undated) DEVICE — CANNULA, EZ GLIDE 24FR

## (undated) DEVICE — DRAIN, WOUND, FLAT, HUBLESS, FULL LENGTH PERFORATION, 10 MM X 20 CM, SILICONE

## (undated) DEVICE — CONNECTOR, STRAIGHT, 0.5 X 0.5 IN

## (undated) DEVICE — DRAPE, SHEET, CARDIOVASCULAR, ANTIMICROBIAL, W/ANESTHESIA SCREEN, IOBAN 2, STERI DRAPE, 107 X 133 IN, DISPOSABLE, FABRIC, BLUE, STERILE

## (undated) DEVICE — DRESSING, MEPILEX, BORDER, HEEL, 8.7 X 9.1 IN

## (undated) DEVICE — MARKER, SKIN, RULER AND LABEL PACK, CUSTOM

## (undated) DEVICE — KIT, COR-KNOT MINI COMBO

## (undated) DEVICE — COLLECTION UNIT, DRAINAGE, THORACIC, SINGLE TUBE, DRY SUCTION, ATS COMPATIBLE, OASIS 3600, LF

## (undated) DEVICE — LOOP, VESSEL, MAXI, RED

## (undated) DEVICE — SUTURE, SILK, 2-0, 18 IN, BLACK